# Patient Record
Sex: FEMALE | Race: WHITE | NOT HISPANIC OR LATINO | Employment: UNEMPLOYED | URBAN - METROPOLITAN AREA
[De-identification: names, ages, dates, MRNs, and addresses within clinical notes are randomized per-mention and may not be internally consistent; named-entity substitution may affect disease eponyms.]

---

## 2018-01-09 NOTE — PSYCH
Psych Med Mgmt    Appearance: was calm and cooperative  Observed mood: mood appropriate  Observed mood: affect appropriate  Speech: a normal rate  Thought processes: coherent/organized  Hallucinations: no hallucinations present  Thought Content: no delusions  Abnormal Thoughts: The patient has no suicidal thoughts and no homicidal thoughts  Orientation: The patient is oriented to person, place and time  Recent and Remote Memory: short term memory intact and long term memory intact  Attention Span And Concentration: concentration intact  Insight: Insight intact  Judgment: Her judgment was intact  Treatment Recommendations: Continue Lorazepam 1 mg tid prn  Lexapro 20 mg po qd  Trazodone 50 mg po qhs prn  Risks, Benefits And Possible Side Effects Of Medications: Risks, benefits, and possible side effects of medications explained to patient and patient verbalizes understanding  She reports normal appetite, normal energy level, no weight change and normal number of sleep hours  Pt states overall she is doing well  She started working at [employer]- she states she likes getting out of the house and is enjoying working  She states her anxiety has been stable- some anxiety but no panic attacks  She states some difficulty sleeping occasionally  She states she was diagnosed with anemia and is on iron  She is feeling much better- she was dizzy/ feeling short of breath  Vitals  Signs [Data Includes: Current Encounter]   Recorded: 60OAR8244 69:15AS   Systolic: 601  Diastolic: 83  Height: 5 ft 2 in  Weight: 155 lb   BMI Calculated: 28 35  BSA Calculated: 1 72    DSM    Provisional Diagnosis: Major Depression  Generalized Anxiety  Assessment    1  Chronic major depressive disorder, recurrent episode (296 30) (F33 9)   2  History of Generalized anxiety disorder (300 02) (F41 1)    Plan    1  From  LORazepam 1 MG Oral Tablet  To LORazepam 1 MG Oral Tablet 1 po tid   prn   2  Escitalopram Oxalate 20 MG Oral Tablet; 1 TAB PO QD   3  TraZODone HCl - 50 MG Oral Tablet; 1 tab po qhs prn    Review of Systems    Constitutional: No fever, no chills, feels well, no tiredness, no recent weight gain or loss  Cardiovascular: no complaints of slow or fast heart rate, no chest pain, no palpitations  Respiratory: no complaints of shortness of breath, no wheezing, no dyspnea on exertion  Gastrointestinal: no complaints of abdominal pain, no constipation, no nausea, no diarrhea, no vomiting  Genitourinary: no complaints of dysuria, no incontinence, no pelvic pain, no urinary frequency  Integumentary: no complaints of skin rash, no itching, no dry skin  Active Problems    1  Acute pancreatitis (577 0) (K85 9)   2  Chronic major depressive disorder, recurrent episode (296 30) (F33 9)   3  Hypertension (401 9) (I10)   4  Joint Pain In Both Knees (719 46)   5  Ulcerative colitis without complications (432 4) (A79 62)    Past Medical History    1  History of Allergic Reaction (995 3)   2  History of Arthritis (V13 4)   3  History of Functional murmur (R01 0)   4  History of Generalized anxiety disorder (300 02) (F41 1)   5  History of chronic pancreatitis (V12 79) (Z87 19)   6  History of heartburn (V12 79) (Z87 898)   7  History of Irritable bowel syndrome (564 1) (K58 9)   8  History of Ulcerative colitis without complications (648 2) (D11 19)    The active problems and past medical history were reviewed and updated today  Allergies    1  Gabapentin CAPS   2  Codeine Derivatives   3  Codeine Sulfate TABS   4  Flexeril TABS   5  NSAIDs   6  PredniSONE TABS   7  Robaxin TABS   8  Ultram TABS    Current Meds   1  Escitalopram Oxalate 20 MG Oral Tablet; 1 TAB PO QD  Requested for: 52IUW1218; Last   Rx:[id number] Ordered   2  LORazepam 1 MG Oral Tablet; Last Rx:[id number] Ordered   3  TraZODone HCl - 50 MG Oral Tablet; 1 tab po qhs  Requested for: 29EZA8141;  Last   Rx:[id number] Ordered    The medication list was reviewed and updated today  Family Psych History    1  Family history of Arthritis (V17 7)   2  Family history of Diabetes Mellitus (V18 0)   3  Family history of Essential Hypertension   4  Family history of Pancreatitis    5  Family history of Breast Cancer (V16 3)   6  Family history of Family Health Status 3  Children Living   7  Family history of Ovarian Cancer (V16 41)    Social History    · Denied: History of Alcohol Use (History)   · Current Every Day Smoker (305 1)   · Current Smoker (305 1)   · Denied: History of Drug Use  The social history was reviewed and updated today  The social history was reviewed and is unchanged  End of Encounter Meds    1  Escitalopram Oxalate 20 MG Oral Tablet; 1 TAB PO QD  Requested for: 05DGF3454; Last   Rx:18Mar2016 Ordered   2  LORazepam 1 MG Oral Tablet; 1 po tid prn; Last Rx:18Mar2016 Ordered   3   TraZODone HCl - 50 MG Oral Tablet; 1 tab po qhs prn  Requested for: 77HTJ2254; Last   Rx:18Mar2016 Ordered    Signatures   Electronically signed by : Oziel Elliott, [facility]; Mar 18 2016  2:16PM EST                       (Author)    Electronically signed by : Jamel Blackburn MD; Mar 21 2016  4:56PM EST

## 2018-01-11 NOTE — PSYCH
Psych Med Mgmt    Appearance: was calm and cooperative  Observed mood: anxious  Observed mood: affect appropriate  Speech: a normal rate  Thought processes: coherent/organized  Hallucinations: no hallucinations present  Thought Content: no delusions  Abnormal Thoughts: The patient has no suicidal thoughts and no homicidal thoughts  Orientation: The patient is oriented to person, place and time  Recent and Remote Memory: short term memory intact and long term memory intact  Attention Span And Concentration: concentration intact  Insight: Insight intact  Judgment: Her judgment was intact  Muscle Strength And Tone  Normal gait and station  Treatment Recommendations: Lexapro 20 mg po qd  Buspar 5 mg po tid   Trazodone 50 mg 1-2 po qhs prn  Ativan 1 mg po tid prn  Risks, Benefits And Possible Side Effects Of Medications: Risks, benefits, and possible side effects of medications explained to patient and patient verbalizes understanding  Discussed with patient the risks of sedation, respiratory depression, impairment of ability to drive and potential for abuse and addiction related to treatment with benzodiazepine medications  The patient understands risk of treatment with benzodiazepine medications, agrees to not drive if feels impaired and agrees to take medications as prescribed  She reports normal appetite, normal energy level, no weight change and normal number of sleep hours  Pt seen for follow up MABLE/ MDD  Pt has continues with residual anxiety and panic attacks  She states if she does not sleep she gets quite panicky/ jumpy and restless  She states she did not sleep for a few days and then she was afraid to go to sleep- fearful of dying  She states she has been doing better since those few days in September  She states she has not been doing drugs or drinking any alcohol  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  No new medical issues- feels well physically  Vitals  Signs   Recorded: 46LOC8343 77:81HT   Systolic: 172  Diastolic: 85  Heart Rate: 73  Recorded: 18Nov2016 10:35AM   Respiration: 16  Height: 5 ft 2 in  Weight: 145 lb   BMI Calculated: 26 52  BSA Calculated: 1 67    DSM    Provisional Diagnosis: MABLE  MDD  Assessment    1  Chronic major depressive disorder, recurrent episode (296 30) (F33 9)   2  Generalized anxiety disorder (300 02) (F41 1)    Plan    1  Escitalopram Oxalate 20 MG Oral Tablet; 1 TAB PO QD   2  LORazepam 1 MG Oral Tablet; 1 po tid prn   3  TraZODone HCl - 50 MG Oral Tablet; 1-2  tabs po qhs prn    4  BusPIRone HCl - 5 MG Oral Tablet; 1 po tid    Review of Systems    Constitutional: No fever, no chills, feels well, no tiredness, no recent weight gain or loss  Cardiovascular: no complaints of slow or fast heart rate, no chest pain, no palpitations  Respiratory: no complaints of shortness of breath, no wheezing, no dyspnea on exertion  Gastrointestinal: no complaints of abdominal pain, no constipation, no nausea, no diarrhea, no vomiting  Genitourinary: no complaints of dysuria, no incontinence, no pelvic pain, no urinary frequency  Musculoskeletal: no complaints of arthralgia, no myalgias, no limb pain, no joint stiffness  Integumentary: no complaints of skin rash, no itching, no dry skin  Neurological: no complaints of headache, no confusion, no numbness, no dizziness  Active Problems    1  Acute pancreatitis (577 0) (K85 90)   2  Chronic major depressive disorder, recurrent episode (296 30) (F33 9)   3  Generalized anxiety disorder (300 02) (F41 1)   4  Hypertension (401 9) (I10)   5  Joint Pain In Both Knees (719 46)   6  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)   7  Panic attacks (300 01) (F41 0)   8  Ulcerative colitis without complications (568 9) (X49 93)    Past Medical History    1  History of Allergic Reaction (995 3)   2  History of Arthritis (V13 4)   3  History of Functional murmur (R01 0)   4  Generalized anxiety disorder (300 02) (F41 1)   5  History of chronic pancreatitis (V12 79) (Z87 19)   6  History of heartburn (V12 79) (Z87 898)   7  History of Irritable bowel syndrome (564 1) (K58 9)   8  History of Ulcerative colitis without complications (135 2) (V63 55)    Allergies    1  Gabapentin CAPS   2  Codeine Derivatives   3  Codeine Sulfate TABS   4  Flexeril TABS   5  NSAIDs   6  PredniSONE TABS   7  Robaxin TABS   8  Ultram TABS    Current Meds   1  BusPIRone HCl - 5 MG Oral Tablet; 1 po tid; Therapy: 25ODX4153 to (Last Rx:41Mrq4516)  Requested for: 50SAA9586 Ordered   2  Escitalopram Oxalate 20 MG Oral Tablet; 1 TAB PO QD  Requested for: 79WVB0036; Last   Rx:35Gkn0535 Ordered   3  LORazepam 1 MG Oral Tablet; 1 po tid prn; Last Rx:[id number] Ordered   4  TraZODone HCl - 50 MG Oral Tablet; 1 tab po qhs prn  Requested for: 33RYU3088; Last   Rx:68Otr1172 Ordered    The medication list was reviewed and updated today  Family Psych History  Father    1  Family history of Arthritis (V17 7)   2  Family history of Diabetes Mellitus (V18 0)   3  Family history of Essential Hypertension   4  Family history of Pancreatitis  Family History    5  Family history of Breast Cancer (V16 3)   6  Family history of Family Health Status 3  Children Living   7  Family history of Ovarian Cancer (V16 41)    The family history was reviewed and updated today  Social History    · Denied: History of Alcohol Use (History)   · Current Every Day Smoker (305 1)   · Current Smoker (305 1)   · History of Drug Use  The social history was reviewed and updated today  The social history was reviewed and is unchanged  End of Encounter Meds    1  Escitalopram Oxalate 20 MG Oral Tablet; 1 TAB PO QD  Requested for: 21SZJ3367; Last   Rx:18Nov2016 Ordered   2  LORazepam 1 MG Oral Tablet; 1 po tid prn; Last Rx:18Nov2016 Ordered   3   TraZODone HCl - 50 MG Oral Tablet; 1-2  tabs po qhs prn  Requested for: 66PTL0635;   Last Rx:18Nov2016 Ordered    4  BusPIRone HCl - 5 MG Oral Tablet; 1 po tid;    Therapy: 71CWI3864 to (Last Rx:18Nov2016)  Requested for: 72QSM8783 Ordered    Future Appointments    Date/Time Provider Specialty Site   11/18/2016 10:30 AM Christine Peterson [address]     Signatures   Electronically signed by : Christine Rodriguez; Nov 18 2016 10:46AM EST                       (Author)    Electronically signed by : Derek Cornejo MD; Nov 18 2016 12:03PM EST

## 2018-01-11 NOTE — PSYCH
Psych Med Mgmt    Appearance: was calm and cooperative  Observed mood: depressed and anxious  Observed mood: affect was tearful   anxious  Speech: a normal rate  Thought processes: coherent/organized  Hallucinations: no hallucinations present  Thought Content: no delusions  Abnormal Thoughts: The patient has no suicidal thoughts and no homicidal thoughts  Orientation: The patient is oriented to person, place and time  Recent and Remote Memory: short term memory intact and long term memory intact  Attention Span And Concentration: concentration intact  Insight: Insight intact  Judgment: Her judgment was intact  Treatment Recommendations: Add Buspar 5 mgpo tid  Ativan 1mg up to 3 x per day-  pt has history of narcotic abuse so do not want to increase further  Continue Lexapro 20 mgpo qd  Trazodone 50 mg po qhs  She reports normal appetite, normal energy level, no weight change and decrease in number of sleep hours   Pt seen for follow up anxiety/ depression  Pt states she had a "panic attack" last week that lasted for 6 days and felt tingling in her hands/ "thinking about everything"  She spoke with [employer] and was ME'B Ativan which helped  Her daughter saw the Ativan and was worried it was pain medication so she flushed them down toilet  She has been feeling better but continues with residual anxiety and depression but some improvement  She has decreased sleep at night due to anxiety and decreased appetite but that is improving also  Vitals  Signs [Data Includes: Current Encounter]   Recorded: [id number] 08:54AM   Heart Rate: 90  Respiration: 16  Systolic: 359  Diastolic: 77  Height: 5 ft 2 in  Weight: 145 lb   BMI Calculated: 26 52  BSA Calculated: 1 67    DSM    Provisional Diagnosis: Recurrent Depression  MABLE  Assessment    1  Chronic major depressive disorder, recurrent episode (296 30) (F33 9)   2  Generalized anxiety disorder (300 02) (F41 1)   3   Panic attacks (300 01) (F41 0)   4  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)    Plan    1  Escitalopram Oxalate 20 MG Oral Tablet; 1 TAB PO QD   2  LORazepam 1 MG Oral Tablet; 1 po tid prn   3  TraZODone HCl - 50 MG Oral Tablet; 1 tab po qhs prn    4  BusPIRone HCl - 5 MG Oral Tablet; 1 po tid    Review of Systems    Constitutional: No fever, no chills, feels well, no tiredness, no recent weight gain or loss  Cardiovascular: no complaints of slow or fast heart rate, no chest pain, no palpitations  Respiratory: no complaints of shortness of breath, no wheezing, no dyspnea on exertion  Gastrointestinal: no complaints of abdominal pain, no constipation, no nausea, no diarrhea, no vomiting  Genitourinary: no complaints of dysuria, no incontinence, no pelvic pain, no urinary frequency  Musculoskeletal: no complaints of arthralgia, no myalgias, no limb pain, no joint stiffness  Integumentary: no complaints of skin rash, no itching, no dry skin  Neurological: no complaints of headache, no confusion, no numbness, no dizziness  Active Problems    1  Acute pancreatitis (577 0) (K85 9)   2  Chronic major depressive disorder, recurrent episode (296 30) (F33 9)   3  Generalized anxiety disorder (300 02) (F41 1)   4  Hypertension (401 9) (I10)   5  Joint Pain In Both Knees (719 46)   6  Ulcerative colitis without complications (005 4) (I64 35)    Past Medical History    1  History of Allergic Reaction (995 3)   2  History of Arthritis (V13 4)   3  History of Functional murmur (R01 0)   4  Generalized anxiety disorder (300 02) (F41 1)   5  History of chronic pancreatitis (V12 79) (Z87 19)   6  History of heartburn (V12 79) (Z87 898)   7  History of Irritable bowel syndrome (564 1) (K58 9)   8  History of Ulcerative colitis without complications (213 5) (Y29 04)    Allergies    1  Gabapentin CAPS   2  Codeine Derivatives   3  Codeine Sulfate TABS   4  Flexeril TABS   5  NSAIDs   6  PredniSONE TABS   7   Robaxin TABS   8  Ultram TABS    Current Meds   1  Escitalopram Oxalate 20 MG Oral Tablet; 1 TAB PO QD  Requested for: 60CPE0540; Last   Rx:18Mar2016 Ordered   2  LORazepam 1 MG Oral Tablet; 1 po tid prn; Last Rx:18Mar2016 Ordered   3  TraZODone HCl - 50 MG Oral Tablet; 1 tab po qhs prn  Requested for: 08LZW1454; Last   Rx:18Mar2016 Ordered    Family Psych History  Father    1  Family history of Arthritis (V17 7)   2  Family history of Diabetes Mellitus (V18 0)   3  Family history of Essential Hypertension   4  Family history of Pancreatitis  Family History    5  Family history of Breast Cancer (V16 3)   6  Family history of Family Health Status 3  Children Living   7  Family history of Ovarian Cancer (V16 41)    The family history was reviewed and updated today  Social History    · Denied: History of Alcohol Use (History)   · Current Every Day Smoker (305 1)   · Current Smoker (305 1)   · History of Drug Use  The social history was reviewed and updated today  End of Encounter Meds    1  Escitalopram Oxalate 20 MG Oral Tablet; 1 TAB PO QD  Requested for: 92SPR6610; Last   Rx:10Mmj8736 Ordered   2  LORazepam 1 MG Oral Tablet; 1 po tid prn; Last Rx:84Aeb1288 Ordered   3  TraZODone HCl - 50 MG Oral Tablet; 1 tab po qhs prn  Requested for: 99ZQL2941; Last   Rx:29Fhm6656 Ordered    4  BusPIRone HCl - 5 MG Oral Tablet; 1 po tid;    Therapy: 91PJL5121 to (Last Rx:99Mcd9014) Ordered    Future Appointments    Date/Time Provider Specialty Site   09/07/2016 02:00 PM Geetha Ram [facility] [place] [facility]     Signatures   Electronically signed by : Oziel Elliott [facility]; Jul 6 2016  9:00AM EST                       (Author)    Electronically signed by : Jamel Blackburn MD; Jul 6 2016  2:25PM EST

## 2024-01-04 ENCOUNTER — APPOINTMENT (EMERGENCY)
Dept: CT IMAGING | Facility: HOSPITAL | Age: 49
End: 2024-01-04
Payer: COMMERCIAL

## 2024-01-04 ENCOUNTER — HOSPITAL ENCOUNTER (INPATIENT)
Facility: HOSPITAL | Age: 49
LOS: 1 days | End: 2024-01-05
Attending: EMERGENCY MEDICINE | Admitting: EMERGENCY MEDICINE
Payer: COMMERCIAL

## 2024-01-04 DIAGNOSIS — E83.42 HYPOMAGNESEMIA: ICD-10-CM

## 2024-01-04 DIAGNOSIS — D64.9 ANEMIA: ICD-10-CM

## 2024-01-04 DIAGNOSIS — E87.1 HYPONATREMIA: ICD-10-CM

## 2024-01-04 DIAGNOSIS — F10.939 ALCOHOL WITHDRAWAL (HCC): ICD-10-CM

## 2024-01-04 DIAGNOSIS — K92.0 HEMATEMESIS: ICD-10-CM

## 2024-01-04 DIAGNOSIS — E80.6 HYPERBILIRUBINEMIA: ICD-10-CM

## 2024-01-04 DIAGNOSIS — E46 MALNUTRITION (HCC): ICD-10-CM

## 2024-01-04 DIAGNOSIS — F10.10 ALCOHOL ABUSE: Primary | ICD-10-CM

## 2024-01-04 DIAGNOSIS — E87.8 HYPOCHLOREMIA: ICD-10-CM

## 2024-01-04 DIAGNOSIS — E87.6 HYPOKALEMIA: ICD-10-CM

## 2024-01-04 PROBLEM — F10.90 ALCOHOL USE DISORDER: Status: ACTIVE | Noted: 2024-01-04

## 2024-01-04 PROBLEM — K76.0 HEPATIC STEATOSIS: Status: ACTIVE | Noted: 2024-01-04

## 2024-01-04 LAB
ALBUMIN SERPL BCP-MCNC: 3.6 G/DL (ref 3.5–5)
ALP SERPL-CCNC: 253 U/L (ref 34–104)
ALT SERPL W P-5'-P-CCNC: 38 U/L (ref 7–52)
ANION GAP SERPL CALCULATED.3IONS-SCNC: 17 MMOL/L
ANION GAP SERPL CALCULATED.3IONS-SCNC: 19 MMOL/L
AST SERPL W P-5'-P-CCNC: 158 U/L (ref 13–39)
ATRIAL RATE: 90 BPM
ATRIAL RATE: 91 BPM
BASOPHILS # BLD AUTO: 0.03 THOUSANDS/ÂΜL (ref 0–0.1)
BASOPHILS NFR BLD AUTO: 0 % (ref 0–1)
BETA-HYDROXYBUTYRATE: 2.8 MMOL/L
BILIRUB SERPL-MCNC: 3.67 MG/DL (ref 0.2–1)
BUN SERPL-MCNC: 3 MG/DL (ref 5–25)
BUN SERPL-MCNC: 3 MG/DL (ref 5–25)
CALCIUM SERPL-MCNC: 7.8 MG/DL (ref 8.4–10.2)
CALCIUM SERPL-MCNC: 8.4 MG/DL (ref 8.4–10.2)
CHLORIDE SERPL-SCNC: 86 MMOL/L (ref 96–108)
CHLORIDE SERPL-SCNC: 86 MMOL/L (ref 96–108)
CO2 SERPL-SCNC: 19 MMOL/L (ref 21–32)
CO2 SERPL-SCNC: 19 MMOL/L (ref 21–32)
CREAT SERPL-MCNC: 0.44 MG/DL (ref 0.6–1.3)
CREAT SERPL-MCNC: 0.47 MG/DL (ref 0.6–1.3)
EOSINOPHIL # BLD AUTO: 0.02 THOUSAND/ÂΜL (ref 0–0.61)
EOSINOPHIL NFR BLD AUTO: 0 % (ref 0–6)
ERYTHROCYTE [DISTWIDTH] IN BLOOD BY AUTOMATED COUNT: 12.6 % (ref 11.6–15.1)
ETHANOL SERPL-MCNC: 80 MG/DL
GFR SERPL CREATININE-BSD FRML MDRD: 118 ML/MIN/1.73SQ M
GFR SERPL CREATININE-BSD FRML MDRD: 120 ML/MIN/1.73SQ M
GLUCOSE SERPL-MCNC: 103 MG/DL (ref 65–140)
GLUCOSE SERPL-MCNC: 99 MG/DL (ref 65–140)
HCT VFR BLD AUTO: 28.8 % (ref 34.8–46.1)
HGB BLD-MCNC: 10.5 G/DL (ref 11.5–15.4)
IMM GRANULOCYTES # BLD AUTO: 0.02 THOUSAND/UL (ref 0–0.2)
IMM GRANULOCYTES NFR BLD AUTO: 0 % (ref 0–2)
LIPASE SERPL-CCNC: <6 U/L (ref 11–82)
LYMPHOCYTES # BLD AUTO: 1.21 THOUSANDS/ÂΜL (ref 0.6–4.47)
LYMPHOCYTES NFR BLD AUTO: 16 % (ref 14–44)
MAGNESIUM SERPL-MCNC: 1.6 MG/DL (ref 1.9–2.7)
MCH RBC QN AUTO: 32.2 PG (ref 26.8–34.3)
MCHC RBC AUTO-ENTMCNC: 36.5 G/DL (ref 31.4–37.4)
MCV RBC AUTO: 88 FL (ref 82–98)
MONOCYTES # BLD AUTO: 0.87 THOUSAND/ÂΜL (ref 0.17–1.22)
MONOCYTES NFR BLD AUTO: 11 % (ref 4–12)
NEUTROPHILS # BLD AUTO: 5.54 THOUSANDS/ÂΜL (ref 1.85–7.62)
NEUTS SEG NFR BLD AUTO: 73 % (ref 43–75)
NRBC BLD AUTO-RTO: 0 /100 WBCS
P AXIS: -24 DEGREES
P AXIS: 49 DEGREES
PLATELET # BLD AUTO: 117 THOUSANDS/UL (ref 149–390)
PMV BLD AUTO: 11.4 FL (ref 8.9–12.7)
POTASSIUM SERPL-SCNC: 2.4 MMOL/L (ref 3.5–5.3)
POTASSIUM SERPL-SCNC: 3 MMOL/L (ref 3.5–5.3)
PR INTERVAL: 154 MS
PR INTERVAL: 162 MS
PROT SERPL-MCNC: 7 G/DL (ref 6.4–8.4)
QRS AXIS: 52 DEGREES
QRS AXIS: 79 DEGREES
QRSD INTERVAL: 90 MS
QRSD INTERVAL: 96 MS
QT INTERVAL: 392 MS
QT INTERVAL: 392 MS
QTC INTERVAL: 479 MS
QTC INTERVAL: 482 MS
RBC # BLD AUTO: 3.26 MILLION/UL (ref 3.81–5.12)
SODIUM SERPL-SCNC: 122 MMOL/L (ref 135–147)
SODIUM SERPL-SCNC: 124 MMOL/L (ref 135–147)
T WAVE AXIS: 14 DEGREES
T WAVE AXIS: 22 DEGREES
VENTRICULAR RATE: 90 BPM
VENTRICULAR RATE: 91 BPM
WBC # BLD AUTO: 7.69 THOUSAND/UL (ref 4.31–10.16)

## 2024-01-04 PROCEDURE — 83735 ASSAY OF MAGNESIUM: CPT | Performed by: EMERGENCY MEDICINE

## 2024-01-04 PROCEDURE — 80053 COMPREHEN METABOLIC PANEL: CPT | Performed by: EMERGENCY MEDICINE

## 2024-01-04 PROCEDURE — 99291 CRITICAL CARE FIRST HOUR: CPT | Performed by: PHYSICIAN ASSISTANT

## 2024-01-04 PROCEDURE — 99285 EMERGENCY DEPT VISIT HI MDM: CPT | Performed by: EMERGENCY MEDICINE

## 2024-01-04 PROCEDURE — 85025 COMPLETE CBC W/AUTO DIFF WBC: CPT | Performed by: EMERGENCY MEDICINE

## 2024-01-04 PROCEDURE — 96375 TX/PRO/DX INJ NEW DRUG ADDON: CPT

## 2024-01-04 PROCEDURE — 82077 ASSAY SPEC XCP UR&BREATH IA: CPT | Performed by: EMERGENCY MEDICINE

## 2024-01-04 PROCEDURE — 83690 ASSAY OF LIPASE: CPT | Performed by: EMERGENCY MEDICINE

## 2024-01-04 PROCEDURE — 96365 THER/PROPH/DIAG IV INF INIT: CPT

## 2024-01-04 PROCEDURE — 99285 EMERGENCY DEPT VISIT HI MDM: CPT

## 2024-01-04 PROCEDURE — HZ2ZZZZ DETOXIFICATION SERVICES FOR SUBSTANCE ABUSE TREATMENT: ICD-10-PCS | Performed by: EMERGENCY MEDICINE

## 2024-01-04 PROCEDURE — 82010 KETONE BODYS QUAN: CPT | Performed by: EMERGENCY MEDICINE

## 2024-01-04 PROCEDURE — 74177 CT ABD & PELVIS W/CONTRAST: CPT

## 2024-01-04 PROCEDURE — 80048 BASIC METABOLIC PNL TOTAL CA: CPT | Performed by: EMERGENCY MEDICINE

## 2024-01-04 PROCEDURE — 93005 ELECTROCARDIOGRAM TRACING: CPT

## 2024-01-04 PROCEDURE — 36415 COLL VENOUS BLD VENIPUNCTURE: CPT | Performed by: EMERGENCY MEDICINE

## 2024-01-04 RX ORDER — HYDROXYZINE 50 MG/1
50 TABLET, FILM COATED ORAL EVERY 6 HOURS PRN
Status: DISCONTINUED | OUTPATIENT
Start: 2024-01-04 | End: 2024-01-05 | Stop reason: HOSPADM

## 2024-01-04 RX ORDER — PHENOBARBITAL SODIUM 65 MG/ML
65 INJECTION INTRAMUSCULAR ONCE
Status: COMPLETED | OUTPATIENT
Start: 2024-01-04 | End: 2024-01-04

## 2024-01-04 RX ORDER — POTASSIUM CHLORIDE 20 MEQ/1
20 TABLET, EXTENDED RELEASE ORAL DAILY
Status: DISCONTINUED | OUTPATIENT
Start: 2024-01-05 | End: 2024-01-04

## 2024-01-04 RX ORDER — FOLIC ACID 1 MG/1
1 TABLET ORAL DAILY
Status: DISCONTINUED | OUTPATIENT
Start: 2024-01-05 | End: 2024-01-05 | Stop reason: HOSPADM

## 2024-01-04 RX ORDER — DIAZEPAM 5 MG/ML
5 INJECTION, SOLUTION INTRAMUSCULAR; INTRAVENOUS ONCE
Status: DISCONTINUED | OUTPATIENT
Start: 2024-01-04 | End: 2024-01-04

## 2024-01-04 RX ORDER — DIAZEPAM 5 MG/ML
5 INJECTION, SOLUTION INTRAMUSCULAR; INTRAVENOUS ONCE
Status: COMPLETED | OUTPATIENT
Start: 2024-01-04 | End: 2024-01-04

## 2024-01-04 RX ORDER — MAGNESIUM HYDROXIDE/ALUMINUM HYDROXICE/SIMETHICONE 120; 1200; 1200 MG/30ML; MG/30ML; MG/30ML
30 SUSPENSION ORAL EVERY 6 HOURS PRN
Status: DISCONTINUED | OUTPATIENT
Start: 2024-01-04 | End: 2024-01-05 | Stop reason: HOSPADM

## 2024-01-04 RX ORDER — PHENOBARBITAL SODIUM 130 MG/ML
130 INJECTION INTRAMUSCULAR ONCE
Status: COMPLETED | OUTPATIENT
Start: 2024-01-04 | End: 2024-01-04

## 2024-01-04 RX ORDER — POTASSIUM CHLORIDE 14.9 MG/ML
20 INJECTION INTRAVENOUS
Qty: 200 ML | Refills: 0 | Status: COMPLETED | OUTPATIENT
Start: 2024-01-04 | End: 2024-01-04

## 2024-01-04 RX ORDER — MAGNESIUM SULFATE HEPTAHYDRATE 40 MG/ML
2 INJECTION, SOLUTION INTRAVENOUS ONCE
Status: DISCONTINUED | OUTPATIENT
Start: 2024-01-04 | End: 2024-01-04

## 2024-01-04 RX ORDER — POTASSIUM CHLORIDE 20 MEQ/1
40 TABLET, EXTENDED RELEASE ORAL DAILY
Status: DISCONTINUED | OUTPATIENT
Start: 2024-01-05 | End: 2024-01-05

## 2024-01-04 RX ORDER — MAGNESIUM SULFATE HEPTAHYDRATE 40 MG/ML
2 INJECTION, SOLUTION INTRAVENOUS ONCE
Status: COMPLETED | OUTPATIENT
Start: 2024-01-04 | End: 2024-01-04

## 2024-01-04 RX ORDER — SODIUM CHLORIDE 9 MG/ML
75 INJECTION, SOLUTION INTRAVENOUS CONTINUOUS
Status: DISCONTINUED | OUTPATIENT
Start: 2024-01-04 | End: 2024-01-05

## 2024-01-04 RX ORDER — ENOXAPARIN SODIUM 100 MG/ML
40 INJECTION SUBCUTANEOUS DAILY
Status: DISCONTINUED | OUTPATIENT
Start: 2024-01-05 | End: 2024-01-05

## 2024-01-04 RX ORDER — ONDANSETRON 2 MG/ML
4 INJECTION INTRAMUSCULAR; INTRAVENOUS EVERY 6 HOURS PRN
Status: DISCONTINUED | OUTPATIENT
Start: 2024-01-04 | End: 2024-01-05 | Stop reason: HOSPADM

## 2024-01-04 RX ORDER — SODIUM CHLORIDE 9 MG/ML
125 INJECTION, SOLUTION INTRAVENOUS CONTINUOUS
Status: DISCONTINUED | OUTPATIENT
Start: 2024-01-04 | End: 2024-01-04

## 2024-01-04 RX ADMIN — DIAZEPAM 5 MG: 10 INJECTION, SOLUTION INTRAMUSCULAR; INTRAVENOUS at 14:35

## 2024-01-04 RX ADMIN — PHENOBARBITAL SODIUM 130 MG: 130 INJECTION INTRAMUSCULAR; INTRAVENOUS at 17:38

## 2024-01-04 RX ADMIN — PHENOBARBITAL SODIUM 65 MG: 65 INJECTION INTRAMUSCULAR; INTRAVENOUS at 19:44

## 2024-01-04 RX ADMIN — HYDROXYZINE HYDROCHLORIDE 50 MG: 50 TABLET, FILM COATED ORAL at 21:23

## 2024-01-04 RX ADMIN — MAGNESIUM SULFATE IN WATER 2 G: 40 INJECTION, SOLUTION INTRAVENOUS at 17:28

## 2024-01-04 RX ADMIN — SODIUM CHLORIDE 75 ML/HR: 0.9 INJECTION, SOLUTION INTRAVENOUS at 17:27

## 2024-01-04 RX ADMIN — POTASSIUM CHLORIDE 20 MEQ: 14.9 INJECTION, SOLUTION INTRAVENOUS at 14:42

## 2024-01-04 RX ADMIN — POTASSIUM CHLORIDE 20 MEQ: 14.9 INJECTION, SOLUTION INTRAVENOUS at 17:32

## 2024-01-04 RX ADMIN — IOHEXOL 75 ML: 350 INJECTION, SOLUTION INTRAVENOUS at 14:09

## 2024-01-04 NOTE — H&P
"HISTORY & PHYSICAL EXAM  DEPARTMENT OF MEDICAL TOXICOLOGY  LEVEL 4 MEDICAL DETOX UNIT  Leslye Holland 48 y.o. female MRN: [id number]  Unit/Bed#:  DETOX 510-01 Encounter: [id number]      Reason for Admission/Principal Problem: Ethanol withdrawal, Ethanol use disorder  Admitting Provider: Jannette Fletcher PA-C  Attending Provider: Jenifer Terrazas*   1/4/2024 11:44 AM        * Alcohol withdrawal syndrome (HCC)  Assessment & Plan  Patient received 5 mg of Valium in the ED for withdrawal symptoms  Initiate SEWS protocol for medically assisted alcohol withdrawal  Initial dose of phenobarbital in unit was 160mg     Alcohol use disorder  Assessment & Plan  Patient admits to drinking approximately 10-12 \"tall cans\" and a fifth of [employer] vodka since New Year's Nany  She states she drinks approximately 12 beers a day as well as 5 shots of vodka  She presents to the ED today with complaints of abdominal pain and initially declined inpatient detox however later on during her encounter, patient was agreeable  ETOH in the ED was 80    Hyponatremia  Assessment & Plan  Sodium was 124 on presentation to the ED  Likely secondary to heavy alcohol use  Patient received 500 cc IV fluid bolus in the ED.  Will continue IV hydration with normal saline at 75cc/hr  Consult placed to nephrology  Continue to monitor, labs ordered for tomorrow morning    Hypokalemia  Assessment & Plan  Potassium 2.4 on admission  Patient received 20 mEq IV potassium chloride in the ED  And additional 20 mEq IV potassium chloride ordered in the unit as well as 40 mill equivalents p.o. potassium chloride  Continue to monitor, labs ordered for the morning    Hypomagnesemia  Assessment & Plan  Magnesium was 1.6 in the ED  Patient received 2 g IV magnesium sulfate in the ED, an additional 2 g ordered in the unit  Continue to monitor, labs ordered for the morning      Hepatic steatosis  Assessment & Plan  Patient was complaining of abdominal pain so " CT abdomen/pelvis was obtained in the ED with findings of marked hepatomegaly with severe fatty infiltration, portal hypertension  Likely secondary to patient's heavy alcohol use  Continue to monitor for worsening abdominal pain, LFTs, T. bili               VTE Prophylaxis: Enoxaparin (Lovenox)  / sequential compression device   Code Status: Full code      Anticipated Length of Stay:  Patient will be admitted on an Inpatient basis with an anticipated length of stay of  >2  midnights.   Justification for Hospital Stay: Alcohol use disorder, alcohol withdrawal syndrome, significant electrolytes abnormalities/dehydration    For any questions or concerns, please Tiger Text the advanced practitioner in the role of [facility]-DETOX-AP On Call      This patient qualifies for Level IV medically managed intensive inpatient services under the criteria set by the American Society of Addiction Medicine, including dimensions 1-3. The patient is in withdrawal (or is intoxicated with high risk of withdrawal), with severe and unstable medical and/or psychiatric (dual diagnosis) problems, requiring requires 24-hour medical and nursing care and the full resources of a licensed hospital.          SEWS PHENOBARBITAL PROTOCOL FOR ALCOHOL WITHDRAWAL    Admit patient to medical detox unit and continue supportive care and stabilization of acute ethanol withdrawal per medical toxicology/detox treatment pathway. Monitor ethanol withdrawal severity via the Severity of Ethanol Withdrawal Scale (SEWS) Q4 hours and then hourly if/when SEWS > 6. Treat withdrawal per pathway and reassess Q30-60 minutes.           Mild SEWS Score 1-6  Administer medications* (IV or PO; PO preferred):  If initial SEWS score: diazepam 10mg PO/IV x 1 AND phenobarbital 65 mg PO/IV x 1  If repeat SEWS score 1-6: phenobarbital 65 mg PO/IV q1 hour x 5 doses maximum   Reassessment:   SEWS q1 hour after each dose until SEWS 0 x 2 hours  VS q1 hours (until SEWS 0, then q4  hours)  Notify provider for bedside evaluation if 5-dose maximum is reached, RASS of -3 to -5, or SEWS score escalates to moderate or severe.   Moderate SEWS Score 7-12  Administer medications* (IV):  If initial SEWS score: diazepam 10mg IV x 1 AND phenobarbital 260 mg IV x 1  If repeat SEWS score 7-12 or score escalated from mild: phenobarbital 130 mg IV q30 minutes x 5 doses maximum   Reassessment:  SEWS q30 minutes after each dose until SEWS < 7 (then hourly until SEWS 0 x 2 hours)  VS q30 minutes until SEWS < 7 (then hourly until SEWS 0, then q4 hours)  Notify provider for bedside evaluation if 5-dose maximum is reached, RASS of -3 to -5, or SEWS score escalates to severe.   Severe SEWS Score ? 13  Administer medications* (IV):  If initial SEWS score: Diazepam 10 mg IV x 1 AND phenobarbital 650 mg IV piggyback x 1 over 15-30 minutes  If repeat SEWS score ? 13 or score escalated from mild or moderate: phenobarbital 130 mg IV q30 minutes x 5 doses maximum   Reassessment:  SEWS q30 minutes after each dose until SEWS < 7 (then hourly until SEWS 0 x 2 hours)   VS q30 minutes until SEWS < 7 (then hourly until SEWS 0, then q4 hours)  Notify provider for bedside evaluation if 5-dose maximum is reached or RASS of -3 to -5   *Hold medications and notify provider if CNS depression, respirations < 10/min, or RASS of -3 to -5.         Medications to be administered adjunctively if more than 2 grams of phenobarbital is needed for stabilization of withdrawal; require attending approval.   Dexmedetomidine infusion 0.1-1mcg/kg/hr IV infusion, titratable to reduced agitation (Goal: RASS -2)  Ketamine   Acute agitated delirium: 1-2 mg/kg IV or 4-5 mg/kg IM  Refractory withdrawal: 0.1-1mg/kg/hr IV infusion, titratable to reduced agitation (Goal: RASS -2)    Further evaluation, screening and treatment:  Evaluate complete metabolic panel, transaminases, INR, and lipase. Assess hepatic ultrasound for any sign of alcoholic liver  "disease or cirrhosis, and ultimately refer for further hepatic evaluation and care as/if indicated.    Additional medications for ethanol associated malnutrition:  Thiamine 100 mg IV daily, increase to 500 mg TID for signs/symptoms of Wernicke's Encephalopathy or Wernicke Korsakoff Syndrome   Folic acid 1 mg IV daily   Multivitamin PO daily      Will offer first monthly injection of Naltrexone 380 mg IM, once patient is stabilized, as it has been shown to assist in decreasing cravings for ethanol.     Evaluate and treat for coexisting substance use, such as opioids and nicotine. Discuss risk factors for infectious disease, such as history of intravenous drug abuse, and offer hepatitis and HIV screening if indicated.     Case management consultation to assist with coordination of subsequent treatment after discharge.      Hx and PE limited by: None    HPI: Leslye Holland is a 48 y.o. year old female with no apparent past medical history who presents with alcohol use disorder and alcohol withdrawal syndrome.  She initially presented to the ED requesting Xanax and Ativan for withdrawal symptoms.  She states she drank approximately 10-12 \"tall cans\" and a fifth of [employer] vodka since New Years Nany. She states she has been drinking approximately 12 tall beers and 5 shots of vodka daily.  Her last drink was this morning.  Alcohol level in the ED was 80.  Patient was found to have significant electrolyte abnormalities with sodium of 124, potassium 2.4, magnesium 1.6.  She was given 500 cc bolus of normal saline in the ED as well as 20meq of IV potassium chloride and 2 g IV magnesium sulfate.  Her vitals remain stable.  She currently admits to anxiety and shakiness.  She was given 5 mg of Valium in the ED.  Patient will be admitted to inpatient detox unit and monitored under [facility] protocol for alcohol withdrawal syndrome.    Preferred alcoholic beverage(s): Beer and vodka  Quantity and frequency of alcohol intake: 12 tall " cans beer daily, 5 shots vodka daily   Use of any ethanol substitutes (toxic alcohols): No  Date/Time of last alcohol intake: 1/4/23   Current signs and symptoms of ethanol withdrawal: anxiety, tremor, and nausea    SEWS Total Score: 8 (1/4/2024  5:03 PM)        Ethanol Withdrawal History  Previous ethanol withdrawal? yes  Prior inpatient treatment for ethanol withdrawal? no  Prior outpatient treatment for ethanol withdrawal? no  History of seizures with prior ethanol withdrawal? yes  Prior treatment with naltrexone (Vivitrol)? no  Current treatment with naltrexone (Vivitrol)? no  Other current treatment for ethanol use disorder? no  Co-existing substance use? no    Review of PDMP: yes     Social History     Substance and Sexual Activity   Alcohol Use Yes    Comment: Fifth of vodka and 12 tall boys     Social History     Substance and Sexual Activity   Drug Use Never     Social History     Tobacco Use   Smoking Status Every Day    Current packs/day: 0.25    Average packs/day: 0.3 packs/day for 6.0 years (1.5 ttl pk-yrs)    Types: Cigarettes    Start date: 1/1/2018   Smokeless Tobacco Never       Review of Systems   Constitutional:  Negative for chills and fever.   HENT: Negative.     Eyes: Negative.    Respiratory:  Negative for cough, chest tightness, shortness of breath and wheezing.    Cardiovascular:  Negative for chest pain, palpitations and leg swelling.   Gastrointestinal:  Positive for abdominal pain. Negative for abdominal distention, blood in stool, constipation, diarrhea, nausea and vomiting.   Endocrine: Negative.    Genitourinary: Negative.    Musculoskeletal: Negative.    Skin: Negative.    Allergic/Immunologic: Negative.    Neurological:  Negative for dizziness, tremors, syncope, facial asymmetry, weakness, light-headedness, numbness and headaches.   Psychiatric/Behavioral:  Negative for confusion, hallucinations, self-injury and suicidal ideas. The patient is nervous/anxious.        Historical  Information   History reviewed. No pertinent past medical history.  Past Surgical History:   Procedure Laterality Date    KNEE SURGERY       History reviewed. No pertinent family history.  Social History   Marital Status: /Civil Union   Occupation: Unemployed  Patient Pre-hospital Living Situation: Lives with father and son  Patient Pre-hospital Level of Mobility: Mobile  Patient Pre-hospital Diet Restrictions: None    Allergies   Allergen Reactions    Acetaminophen Other (See Comments)     Other reaction(s): LIVER ISSUES    Aspirin GI Intolerance    Codeine Hives    Cyclobenzaprine Hives    Ketorolac Tromethamine Hives    Lamotrigine Other (See Comments)     lower extremity pain    Methocarbamol Hives    Nsaids Other (See Comments)     GI upset:Toradol=allergy    Tramadol Hives     Other reaction(s): rash       Prior to Admission medications    Not on File       Current Facility-Administered Medications   Medication Dose Route Frequency    aluminum-magnesium hydroxide-simethicone (MAALOX) oral suspension 30 mL  30 mL Oral Q6H PRN    [START ON 1/5/2024] enoxaparin (LOVENOX) subcutaneous injection 40 mg  40 mg Subcutaneous Daily    [START ON 1/5/2024] folic acid (FOLVITE) tablet 1 mg  1 mg Oral Daily    [START ON 1/5/2024] multivitamin-minerals (CENTRUM) tablet 1 tablet  1 tablet Oral Daily    [START ON 1/5/2024] nicotine (NICODERM CQ) 7 mg/24hr TD 24 hr patch 1 patch  1 patch Transdermal Daily    ondansetron (ZOFRAN) injection 4 mg  4 mg Intravenous Q6H PRN    [START ON 1/5/2024] potassium chloride (K-DUR,KLOR-CON) CR tablet 40 mEq  40 mEq Oral Daily    potassium chloride 20 mEq IVPB (premix)  20 mEq Intravenous Q2H    sodium chloride 0.9 % infusion  75 mL/hr Intravenous Continuous    thiamine (VITAMIN B1) 500 mg in sodium chloride 0.9 % 50 mL IVPB  500 mg Intravenous Q8H VARGAS       Continuous Infusions:sodium chloride, 75 mL/hr, Last Rate: 75 mL/hr (01/04/24 1727)             Objective     No intake or output  data in the 24 hours ending 01/04/24 5248    Invasive Devices:   Peripheral IV 01/04/24 Left;Proximal;Ventral (anterior) Forearm (Active)   Site Assessment WDL 01/04/24 1254   Dressing Type Transparent 01/04/24 1254   Line Status Blood return noted 01/04/24 1254   Dressing Status Clean;Dry;Intact 01/04/24 1254       Vitals   Vitals:    01/04/24 1151 01/04/24 1436 01/04/24 1640   BP: 132/91 130/77 104/60   TempSrc: Tympanic  Temporal   Pulse: 91 88 93   Resp: 16 16 18   Patient Position - Orthostatic VS: Lying Lying Lying   Temp: (!) 97 °F (36.1 °C)  97.5 °F (36.4 °C)       Physical Exam  Constitutional:       General: She is not in acute distress.     Appearance: She is not diaphoretic.      Comments: Chronically ill-appearing, malnourished   HENT:      Head: Normocephalic and atraumatic.      Nose: Nose normal.      Mouth/Throat:      Mouth: Mucous membranes are dry.   Eyes:      Extraocular Movements: Extraocular movements intact.      Pupils: Pupils are equal, round, and reactive to light.   Cardiovascular:      Rate and Rhythm: Normal rate and regular rhythm.      Heart sounds: Normal heart sounds.   Pulmonary:      Effort: Pulmonary effort is normal. No respiratory distress.      Breath sounds: Normal breath sounds.   Abdominal:      General: Abdomen is flat. There is no distension.      Palpations: Abdomen is soft.      Tenderness: There is abdominal tenderness (epigastric region). There is no guarding or rebound.   Musculoskeletal:         General: Normal range of motion.      Cervical back: Normal range of motion and neck supple. No tenderness.   Skin:     General: Skin is warm and dry.      Capillary Refill: Capillary refill takes less than 2 seconds.      Coloration: Skin is jaundiced.   Neurological:      General: No focal deficit present.      Mental Status: She is alert and oriented to person, place, and time. Mental status is at baseline.   Psychiatric:         Mood and Affect: Mood normal.          "Behavior: Behavior normal.           Data:    EKG, Pathology, and Other Studies: I have personally reviewed pertinent reports.   and I have personally reviewed pertinent films in PACS  EKG:   EKG documented by ED staff.  Unable to pull up images however.  Will order new EKG      Lab Results:  CBC ETOH     Lab Results   Component Value Date    WBC 7.69 01/04/2024    WBC 11.53 (H) 05/08/2014    RBC 3.26 (L) 01/04/2024    RBC 5.05 05/08/2014    HGB 10.5 (L) 01/04/2024    HGB 15.2 05/08/2014    HCT 28.8 (L) 01/04/2024    HCT 44.9 05/08/2014    MCV 88 01/04/2024    MCV 89 05/08/2014    MCH 32.2 01/04/2024    MCH 30.1 05/08/2014    MCHC 36.5 01/04/2024    MCHC 33.9 05/08/2014    RDW 12.6 01/04/2024    RDW 13.6 05/08/2014     (L) 01/04/2024     05/08/2014    MPV 11.4 01/04/2024    MPV 9.2 05/08/2014      No results found for: \"LACTICACID\"   CMP UA         Component Value Date/Time     05/08/2014 1432    K 3.0 (L) 01/04/2024 1434    K 3.5 05/08/2014 1432    CL 86 (L) 01/04/2024 1434     05/08/2014 1432    CO2 19 (L) 01/04/2024 1434    CO2 31 05/08/2014 1432    BUN 3 (L) 01/04/2024 1434    BUN 11 05/08/2014 1432    CREATININE 0.44 (L) 01/04/2024 1434    CREATININE 0.51 (L) 05/08/2014 1432         Component Value Date/Time    CALCIUM 7.8 (L) 01/04/2024 1434    CALCIUM 9.3 05/08/2014 1432    ALKPHOS 253 (H) 01/04/2024 1253    ALKPHOS 128 05/08/2014 1432     (H) 01/04/2024 1253    AST 36 05/08/2014 1432    ALT 38 01/04/2024 1253    ALT 57 05/08/2014 1432    BILITOT 0.69 05/08/2014 1432      Lab Results   Component Value Date    CLARITYU Clear 05/08/2014    COLORU Yellow 05/08/2014    SPECGRAV 1.025 05/08/2014    PHUR 7.0 05/08/2014    GLUCOSEU Negative 05/08/2014    KETONESU 40 (2+) (A) 05/08/2014    BLOODU Trace (A) 05/08/2014    NITRITE Negative 05/08/2014    BILIRUBINUR Negative 05/08/2014    UROBILINOGEN 1.0 05/08/2014    LEUKOCYTESUR Trace (A) 05/08/2014    WBCUA 4-10 (A) 05/08/2014    " "RBCUA 2-4 05/08/2014    BACTERIA Occasional 05/08/2014    EPIS Occasional (A) 05/08/2014        Liver Function Test: ASA     Lab Results   Component Value Date    TBILI 3.67 (H) 01/04/2024    ALKPHOS 253 (H) 01/04/2024    ALKPHOS 128 05/08/2014     (H) 01/04/2024    AST 36 05/08/2014    ALT 38 01/04/2024    ALT 57 05/08/2014    TP 7.0 01/04/2024    ALB 3.6 01/04/2024    ALB 4.4 05/08/2014      No results found for: \"SALICYLATE\"   Troponin APAP     No results found for: \"TROPONINI\"   No results found for: \"ACTMNPHEN\"   VBG HCG     No results found for: \"PHVEN\", \"QOK6EDV\", \"PO2VEN\", \"QGY8MMU\", \"BEVEN\", \"L1PGXNYFQ\", \"T1WPCNI\"   No results found for: \"HCGQUANT\"   ABG Urine Drug Screen     No results found for: \"PHART\", \"PMR3KQX\", \"PO2ART\", \"RPG3QYB\", \"BEART\", \"B5JJGJVSA\", \"O2HGB\", \"SOURC\", \"EDDI\", \"VTAC\", \"ACRATE\", \"INSPIREDAIR\", \"PEEP\"   No results found for: \"AMPMETHUR\", \"BARBTUR\", \"BDZUR\", \"COCAINEUR\", \"METHADONEUR\", \"OPIATEUR\", \"PCPUR\", \"THCUR\", \"OXYCODONEUR\"   Lactate INR     No results found for: \"LACTICACID\"   No results found for: \"INR\"   PTT Protime     No results found for: \"PTT\"     No results found for: \"PROTIME\"           Imaging Studies: I have personally reviewed pertinent reports.        Counseling / Coordination of Care  Total floor / unit time spent today 80 minutes. Greater than 50% of total time was spent with the patient and / or family counseling and / or coordination of care.       Minutes of critical care time 40  -Critical care time was exclusive of separately billable procedures and teaching time.   -Critical care was necessary to treat or prevent imminent or life-threatening deterioration of the following condition: CNS failure/compromise, toxidrome (ethanol withdrawal),  withdrawal, hepatic failure, renal failure, and dehydration  -Critical care time was spent personally by me on the following activities as well as the above as per the course and rest of chart: obtaining history from " patient/surrogate, development of a treatment plan, discussions with referring provider(s), evaluation of patient's response to the treatment, examination of the patient, performing treatments and interventions, re-evaluation of the patient's condition, review of old charts, ordering/interpreting laboratory studies, ordering/interpreting of radiographic studies.      ** Please Note: This note has been constructed using a voice recognition system. **

## 2024-01-04 NOTE — PLAN OF CARE
Problem: SUBSTANCE USE/ABUSE  Goal: By discharge, will develop insight into their chemical dependency and sustain motivation to continue in recovery  Description: INTERVENTIONS:  - Attends all daily group sessions and scheduled AA groups  - Actively practices coping skills through participation in the therapeutic community and adherence to program rules  - Reviews and completes assignments from individual treatment plan  - Assist patient development of understanding of their personal cycle of addiction and relapse triggers  Outcome: Progressing

## 2024-01-04 NOTE — ASSESSMENT & PLAN NOTE
"Recent Labs     01/04/24  1253 01/05/24  0800   * 114*   ALT 38 34     Recent Labs     01/05/24  1104   INR 1.14     CT abdomen/pelvis was obtained in the ED with findings of marked hepatomegaly with severe fatty infiltration and portal hypertension  RUQ US: \"Hepatomegaly and severe hepatic steatosis. Coexisting fibrotic or inflammatory changes not excluded\"  Likely secondary to patient's chronic alcohol use  Concern for underlying undiagnosed cirrhosis, as well, given findings of poral hypertension  GI consulted; appreciate recommendations  No indication for steroids at this time  Suspect possible element of alcoholic hepatitis  Will continue to monitor liver function closely  "

## 2024-01-04 NOTE — ASSESSMENT & PLAN NOTE
Continue SEWS protocol with symptom-triggered, as needed phenobarbital  Patient has received a total of 845 mg phenobarbital   Can continue symptom-triggered, as needed phenobarbital (130 mg IV mild-mod and 260 mg IV mod-severe withdrawal) and titrate to RASS -1 to -2 as patient likely will not be able to participate in CIWA  Maximum dosing of phenobarbital is around 2 grams  Patient's underlying liver dysfunction may decrease clearance of phenobarbital, prolonging its half-life and effects

## 2024-01-04 NOTE — ED PROVIDER NOTES
"History  Chief Complaint   Patient presents with    Alcohol Intoxication     Pt was brought by [facility] for evaluation of ETOH intoxication. Since the day before New Year's Nany pt drank a fifth of Amilcar's and 12 \"tall boys\" of beer. This nurse asked if pt wants detox evaluation and pt declined at this time.      Patient is a 48-year-old female.  She has a history of EtOH abuse.  Denies drug abuse.  As per patient, her son called the ambulance because he was concerned about her drinking.  She had been drinking today.  No trauma.  No fever.  No concomitant drug use.  She is not suicidal.  She declines drug and alcohol treatment at this time.  She is requesting Xanax or Ativan.        None       History reviewed. No pertinent past medical history.    Past Surgical History:   Procedure Laterality Date    KNEE SURGERY         History reviewed. No pertinent family history.  I have reviewed and agree with the history as documented.    E-Cigarette/Vaping     E-Cigarette/Vaping Substances     Social History     Tobacco Use    Smoking status: Every Day     Current packs/day: 0.25     Average packs/day: 0.3 packs/day for 6.0 years (1.5 ttl pk-yrs)     Types: Cigarettes     Start date: 1/1/2018    Smokeless tobacco: Never   Substance Use Topics    Alcohol use: Yes     Comment: Fifth of vodka and 12 tall boys    Drug use: Never       Review of Systems   Constitutional:  Negative for chills and fever.   HENT:  Negative for rhinorrhea and sore throat.    Eyes:  Negative for pain, redness and visual disturbance.   Respiratory:  Negative for cough and shortness of breath.    Cardiovascular:  Negative for chest pain and leg swelling.   Gastrointestinal:  Positive for abdominal pain. Negative for diarrhea and vomiting.   Endocrine: Negative for polydipsia and polyuria.   Genitourinary:  Negative for dysuria, frequency, hematuria, vaginal bleeding and vaginal discharge.   Musculoskeletal:  Negative for back pain and neck pain. "   Skin:  Negative for rash and wound.   Allergic/Immunologic: Negative for immunocompromised state.   Neurological:  Negative for weakness, numbness and headaches.   Hematological:  Does not bruise/bleed easily.   Psychiatric/Behavioral:  Negative for hallucinations and suicidal ideas.    All other systems reviewed and are negative.      Physical Exam  Physical Exam  Vitals reviewed.   Constitutional:       General: She is not in acute distress.  HENT:      Head: Normocephalic and atraumatic.      Nose: Nose normal.      Mouth/Throat:      Mouth: Mucous membranes are moist.   Eyes:      General:         Right eye: No discharge.         Left eye: No discharge.      Conjunctiva/sclera: Conjunctivae normal.   Cardiovascular:      Rate and Rhythm: Normal rate and regular rhythm.      Pulses: Normal pulses.      Heart sounds: Normal heart sounds. No murmur heard.     No friction rub. No gallop.   Pulmonary:      Effort: Pulmonary effort is normal. No respiratory distress.      Breath sounds: Normal breath sounds. No stridor. No wheezing, rhonchi or rales.   Abdominal:      General: Bowel sounds are normal. There is no distension.      Palpations: Abdomen is soft.      Tenderness: There is abdominal tenderness. There is no right CVA tenderness, left CVA tenderness, guarding or rebound.      Comments: There is moderate epigastric tenderness.   Musculoskeletal:         General: No swelling, tenderness, deformity or signs of injury. Normal range of motion.      Cervical back: Normal range of motion and neck supple. No rigidity.      Right lower leg: No edema.      Left lower leg: No edema.      Comments: No calf tenderness or unilateral leg swelling.   Skin:     General: Skin is warm and dry.      Coloration: Skin is not jaundiced.      Findings: No rash.   Neurological:      General: No focal deficit present.      Mental Status: She is alert and oriented to person, place, and time.      Sensory: No sensory deficit.       Motor: Motor function is intact.   Psychiatric:         Mood and Affect: Mood normal.         Behavior: Behavior normal.         Vital Signs  ED Triage Vitals   Temperature Pulse Respirations Blood Pressure SpO2   01/04/24 1151 01/04/24 1151 01/04/24 1151 01/04/24 1151 01/04/24 1151   (!) 97 °F (36.1 °C) 91 16 132/91 100 %      Temp Source Heart Rate Source Patient Position - Orthostatic VS BP Location FiO2 (%)   01/04/24 1151 01/04/24 1151 01/04/24 1151 01/04/24 1151 --   Tympanic Monitor Lying Left arm       Pain Score       01/04/24 1436       No Pain           Vitals:    01/04/24 1151 01/04/24 1436   BP: 132/91 130/77   Pulse: 91 88   Patient Position - Orthostatic VS: Lying Lying         Visual Acuity      ED Medications  Medications   thiamine (VITAMIN B1) 100 mg in sodium chloride 0.9 % 50 mL IVPB (has no administration in time range)   potassium chloride 20 mEq IVPB (premix) (20 mEq Intravenous New Bag 1/4/24 1442)   magnesium sulfate 2 g/50 mL IVPB (premix) 2 g (has no administration in time range)   diazepam (VALIUM) injection 5 mg (has no administration in time range)   iohexol (OMNIPAQUE) 350 MG/ML injection (MULTI-DOSE) 75 mL (75 mL Intravenous Given 1/4/24 1409)   diazepam (VALIUM) injection 5 mg (5 mg Intravenous Given 1/4/24 1435)       Diagnostic Studies  Results Reviewed       Procedure Component Value Units Date/Time    Basic metabolic panel [[id number]]  (Abnormal) Collected: 01/04/24 1434    Lab Status: Final result Specimen: Blood from Arm, Left Updated: 01/04/24 1504     Sodium 122 mmol/L      Potassium 3.0 mmol/L      Chloride 86 mmol/L      CO2 19 mmol/L      ANION GAP 17 mmol/L      BUN 3 mg/dL      Creatinine 0.44 mg/dL      Glucose 99 mg/dL      Calcium 7.8 mg/dL      eGFR 120 ml/min/1.73sq m     Narrative:      National Kid[employer] Disease Foundation guidelines for Chronic Kidney Disease (CKD):     Stage 1 with normal or high GFR (GFR > 90 mL/min/1.73 square meters)    Stage 2 Mild CKD (GFR =  60-89 mL/min/1.73 square meters)    Stage 3A Moderate CKD (GFR = 45-59 mL/min/1.73 square meters)    Stage 3B Moderate CKD (GFR = 30-44 mL/min/1.73 square meters)    Stage 4 Severe CKD (GFR = 15-29 mL/min/1.73 square meters)    Stage 5 End Stage CKD (GFR <15 mL/min/1.73 square meters)  Note: GFR calculation is accurate only with a steady state creatinine    Magnesium [[id number]]  (Abnormal) Collected: 01/04/24 1434    Lab Status: Final result Specimen: Blood from Arm, Left Updated: 01/04/24 1504     Magnesium 1.6 mg/dL     Beta Hydroxybutyrate [[id number]]  (Abnormal) Collected: 01/04/24 1434    Lab Status: Final result Specimen: Blood from Arm, Left Updated: 01/04/24 1449     BETA-HYDROXYBUTYRATE 2.8 mmol/L     Comprehensive metabolic panel [[id number]]  (Abnormal) Collected: 01/04/24 1253    Lab Status: Final result Specimen: Blood from Arm, Left Updated: 01/04/24 1342     Sodium 124 mmol/L      Potassium 2.4 mmol/L      Chloride 86 mmol/L      CO2 19 mmol/L      ANION GAP 19 mmol/L      BUN 3 mg/dL      Creatinine 0.47 mg/dL      Glucose 103 mg/dL      Calcium 8.4 mg/dL       U/L      ALT 38 U/L      Alkaline Phosphatase 253 U/L      Total Protein 7.0 g/dL      Albumin 3.6 g/dL      Total Bilirubin 3.67 mg/dL      eGFR 118 ml/min/1.73sq m     Narrative:      National Kidney Disease Foundation guidelines for Chronic Kidney Disease (CKD):     Stage 1 with normal or high GFR (GFR > 90 mL/min/1.73 square meters)    Stage 2 Mild CKD (GFR = 60-89 mL/min/1.73 square meters)    Stage 3A Moderate CKD (GFR = 45-59 mL/min/1.73 square meters)    Stage 3B Moderate CKD (GFR = 30-44 mL/min/1.73 square meters)    Stage 4 Severe CKD (GFR = 15-29 mL/min/1.73 square meters)    Stage 5 End Stage CKD (GFR <15 mL/min/1.73 square meters)  Note: GFR calculation is accurate only with a steady state creatinine    Lipase [[id number]]  (Abnormal) Collected: 01/04/24 4329    Lab Status: Final result Specimen: Blood from Arm, Left  Updated: 01/04/24 1340     Lipase <6 u/L     Ethanol [[id number]]  (Abnormal) Collected: 01/04/24 1253    Lab Status: Final result Specimen: Blood from Arm, Left Updated: 01/04/24 1327     Ethanol Lvl 80 mg/dL     CBC and differential [[id number]]  (Abnormal) Collected: 01/04/24 1253    Lab Status: Final result Specimen: Blood from Arm, Left Updated: 01/04/24 1317     WBC 7.69 Thousand/uL      RBC 3.26 Million/uL      Hemoglobin 10.5 g/dL      Hematocrit 28.8 %      MCV 88 fL      MCH 32.2 pg      MCHC 36.5 g/dL      RDW 12.6 %      MPV 11.4 fL      Platelets 117 Thousands/uL      nRBC 0 /100 WBCs      Neutrophils Relative 73 %      Immat GRANS % 0 %      Lymphocytes Relative 16 %      Monocytes Relative 11 %      Eosinophils Relative 0 %      Basophils Relative 0 %      Neutrophils Absolute 5.54 Thousands/µL      Immature Grans Absolute 0.02 Thousand/uL      Lymphocytes Absolute 1.21 Thousands/µL      Monocytes Absolute 0.87 Thousand/µL      Eosinophils Absolute 0.02 Thousand/µL      Basophils Absolute 0.03 Thousands/µL                    CT abdomen pelvis with contrast   Final Result by Shivam Arellano MD (01/04 1450)      Marked hepatomegaly with severe fatty infiltration. Portal hypertension.      Uncomplicated colonic diverticulosis.      Diffuse osteopenia, advanced for stated age.            Workstation performed: [id number]                    Procedures  ECG 12 Lead Documentation Only    Date/Time: 1/4/2024 1:10 PM    Performed by: Darren So MD  Authorized by: Darren So MD    ECG reviewed by me, the ED Provider: yes    Patient location:  ED  Interpretation:     Interpretation: abnormal    Rate:     ECG rate assessment: normal    Rhythm:     Rhythm: sinus rhythm    Ectopy:     Ectopy: none    QRS:     QRS axis:  Normal  Conduction:     Conduction: normal    ST segments:     ST segments:  Normal  T waves:     T waves: normal    Other findings:     Other findings: prolonged qTc interval             ED  Course                                             Medical Decision Making  Initially patient refused drug and alcohol treatment.  Later during her ED stay, she became amendable to admission for detox.  Consulted with medical toxicology.  Patient accepted for admission to medical detox.  She did receive Valium in the emergency room for withdrawal symptoms.  She also received thiamine.  Patient did complain of abdominal pain during her ED stay.  CT of the abdomen and pelvis was nonspecific.  She was found to have multiple electrolyte abnormalities.  Potassium and magnesium replacement was ordered.  She did not have pancreatitis.  There was no perforation.  Patient may have alcoholic gastritis.  There was no appendicitis, acute cholecystitis, AAA, acute coronary syndrome, small bowel obstruction, obstructive uropathy, acute diverticulitis, or other causes of abdominal pain.    Amount and/or Complexity of Data Reviewed  Labs: ordered. Decision-making details documented in ED Course.  Radiology: ordered. Decision-making details documented in ED Course.  ECG/medicine tests: ordered and independent interpretation performed. Decision-making details documented in ED Course.  Discussion of management or test interpretation with external provider(s): Medical toxicology    Risk  Prescription drug management.  Decision regarding hospitalization.  Diagnosis or treatment significantly limited by social determinants of health.             Disposition  Final diagnoses:   Alcohol abuse   Alcohol withdrawal (HCC)   Hypokalemia   Hypomagnesemia   Hyponatremia   Hypochloremia   Anemia   Hyperbilirubinemia     Time reflects when diagnosis was documented in both MDM as applicable and the Disposition within this note       Time User Action Codes Description Comment    1/4/2024  3:32 PM Darren So Add [F10.10] Alcohol abuse     1/4/2024  3:32 PM Darren So Add [F10.939] Alcohol withdrawal (HCC)     1/4/2024  3:32 PM Judah  Darren J Add [E87.6] Hypokalemia     1/4/2024  3:32 PM JudahDarren carrillo [E83.42] Hypomagnesemia     1/4/2024  3:32 PM Darren So [E87.1] Hyponatremia     1/4/2024  3:32 PM Darren So [E87.8] Hypochloremia     1/4/2024  3:32 PM Darren So [D64.9] Anemia     1/4/2024  3:33 PM Darren So [E80.6] Hyperbilirubinemia           ED Disposition       ED Disposition   Admit    Condition   Stable    Date/Time   Thu Jan 4, 2024  3:32 PM    Comment                  Follow-up Information    None         Patient's Medications    No medications on file       No discharge procedures on file.    PDMP Review       None            ED Provider  Electronically Signed by             Darren So MD  01/04/24 0272

## 2024-01-04 NOTE — ASSESSMENT & PLAN NOTE
Recent Labs     01/04/24  1253 01/04/24  1434 01/05/24  0107 01/05/24  0800 01/05/24  1400   K 2.4* 3.0* 2.1* 2.6*  3.1* 3.1*     Receiving IV replacement with frequent monitoring  Continue to closely monitor

## 2024-01-04 NOTE — ASSESSMENT & PLAN NOTE
Recent Labs     01/04/24  1253 01/04/24  1434 01/05/24  0107 01/05/24  0800 01/05/24  1400   SODIUM 124* 122* 130* 131*  130* 132*     Patient received small amount of fluid resuscitation in ED  Nephrology consulted; appreciate recommendations  Close BMP monitoring  Target 132 by this afternoon and 136 by tomorrow afternoon  Nephrology following  Received 500 cc bolus D5LR this afternoon due to concern for ongoing dehydration and mild AKA  Fluid restriction 1.8L  Strict I&Os  Suspect secondary to chronic alcohol use/beer potomania but likely complicated by underlying liver disease  Continue to monitor

## 2024-01-04 NOTE — ASSESSMENT & PLAN NOTE
Continue daily vitamin supplementation including high dose thiamine 500 mg IV every 8 hours x 9 doses  Unclear if patient desires naltrexone or would be a good candidate given underlying liver disease  Case management consulted for disposition planning  Patient to be transferred to [facility] for higher level of care and endoscopy; case management consultation there would be beneficial prior to discharge

## 2024-01-04 NOTE — ASSESSMENT & PLAN NOTE
Recent Labs     01/04/24  1434 01/05/24  0800   MG 1.6* 2.1     Improved with IV replacement  Continue to monitor

## 2024-01-05 ENCOUNTER — APPOINTMENT (INPATIENT)
Dept: ULTRASOUND IMAGING | Facility: HOSPITAL | Age: 49
End: 2024-01-05
Payer: COMMERCIAL

## 2024-01-05 ENCOUNTER — APPOINTMENT (INPATIENT)
Dept: INTERVENTIONAL RADIOLOGY/VASCULAR | Facility: HOSPITAL | Age: 49
End: 2024-01-05
Payer: COMMERCIAL

## 2024-01-05 ENCOUNTER — HOSPITAL ENCOUNTER (INPATIENT)
Facility: HOSPITAL | Age: 49
LOS: 15 days | Discharge: HOME | DRG: 253 | End: 2024-01-20
Attending: INTERNAL MEDICINE | Admitting: INTERNAL MEDICINE
Payer: COMMERCIAL

## 2024-01-05 ENCOUNTER — APPOINTMENT (INPATIENT)
Dept: CT IMAGING | Facility: HOSPITAL | Age: 49
End: 2024-01-05
Payer: COMMERCIAL

## 2024-01-05 VITALS
HEIGHT: 60 IN | HEART RATE: 94 BPM | TEMPERATURE: 97.9 F | RESPIRATION RATE: 18 BRPM | WEIGHT: 93 LBS | DIASTOLIC BLOOD PRESSURE: 78 MMHG | OXYGEN SATURATION: 100 % | SYSTOLIC BLOOD PRESSURE: 105 MMHG | BODY MASS INDEX: 18.26 KG/M2

## 2024-01-05 DIAGNOSIS — K92.0 HEMATEMESIS: ICD-10-CM

## 2024-01-05 DIAGNOSIS — E87.8 ELECTROLYTE ABNORMALITY: ICD-10-CM

## 2024-01-05 DIAGNOSIS — K76.82 HEPATIC ENCEPHALOPATHY (HCC): ICD-10-CM

## 2024-01-05 DIAGNOSIS — F10.939 ALCOHOL WITHDRAWAL WITH COMPLICATION WITH INPATIENT TREATMENT (HCC): Primary | ICD-10-CM

## 2024-01-05 DIAGNOSIS — K76.0 HEPATIC STEATOSIS: ICD-10-CM

## 2024-01-05 DIAGNOSIS — K29.61 GASTROINTESTINAL HEMORRHAGE ASSOCIATED WITH OTHER GASTRITIS: ICD-10-CM

## 2024-01-05 DIAGNOSIS — E83.42 HYPOMAGNESEMIA: ICD-10-CM

## 2024-01-05 DIAGNOSIS — F41.9 ANXIETY: ICD-10-CM

## 2024-01-05 DIAGNOSIS — F10.20 ALCOHOL USE DISORDER, SEVERE, DEPENDENCE (HCC): ICD-10-CM

## 2024-01-05 PROBLEM — E44.1 MILD PROTEIN-CALORIE MALNUTRITION (HCC): Status: ACTIVE | Noted: 2024-01-05

## 2024-01-05 PROBLEM — K70.30 CIRRHOSIS, ALCOHOLIC (HCC): Status: ACTIVE | Noted: 2024-01-05

## 2024-01-05 PROBLEM — E43 SEVERE PROTEIN-CALORIE MALNUTRITION (HCC): Chronic | Status: ACTIVE | Noted: 2024-01-05

## 2024-01-05 PROBLEM — K92.2 GI BLEED: Status: ACTIVE | Noted: 2024-01-05

## 2024-01-05 PROBLEM — E83.39 HYPOPHOSPHATEMIA: Status: ACTIVE | Noted: 2024-01-05

## 2024-01-05 PROBLEM — M85.80 OSTEOPENIA: Status: ACTIVE | Noted: 2024-01-05

## 2024-01-05 PROBLEM — E87.29 ALCOHOLIC KETOACIDOSIS: Status: ACTIVE | Noted: 2024-01-05

## 2024-01-05 LAB
ABO GROUP BLD: NORMAL
ALBUMIN SERPL BCP-MCNC: 3.1 G/DL (ref 3.5–5)
ALBUMIN SERPL BCP-MCNC: 3.6 G/DL (ref 3.5–5)
ALP SERPL-CCNC: 222 U/L (ref 34–104)
ALP SERPL-CCNC: 266 U/L (ref 34–104)
ALT SERPL W P-5'-P-CCNC: 29 U/L (ref 7–52)
ALT SERPL W P-5'-P-CCNC: 34 U/L (ref 7–52)
AMMONIA PLAS-SCNC: 178 UMOL/L (ref 18–72)
ANION GAP SERPL CALCULATED.3IONS-SCNC: 11 MMOL/L
ANION GAP SERPL CALCULATED.3IONS-SCNC: 14 MMOL/L
ANION GAP SERPL CALCULATED.3IONS-SCNC: 15 MMOL/L
ANION GAP SERPL CALCULATED.3IONS-SCNC: 15 MMOL/L
ANION GAP SERPL CALCULATED.3IONS-SCNC: 8 MMOL/L
AST SERPL W P-5'-P-CCNC: 114 U/L (ref 13–39)
AST SERPL W P-5'-P-CCNC: 78 U/L (ref 13–39)
BACTERIA UR QL AUTO: ABNORMAL /HPF
BASOPHILS # BLD AUTO: 0.03 THOUSANDS/ÂΜL (ref 0–0.1)
BASOPHILS NFR BLD AUTO: 0 % (ref 0–1)
BILIRUB SERPL-MCNC: 4.37 MG/DL (ref 0.2–1)
BILIRUB SERPL-MCNC: 4.8 MG/DL (ref 0.2–1)
BILIRUB UR QL STRIP: NEGATIVE
BLD GP AB SCN SERPL QL: NEGATIVE
BUN SERPL-MCNC: 3 MG/DL (ref 5–25)
BUN SERPL-MCNC: 4 MG/DL (ref 5–25)
BUN SERPL-MCNC: 4 MG/DL (ref 5–25)
CALCIUM ALBUM COR SERPL-MCNC: 8.9 MG/DL (ref 8.3–10.1)
CALCIUM SERPL-MCNC: 7.8 MG/DL (ref 8.4–10.2)
CALCIUM SERPL-MCNC: 8 MG/DL (ref 8.4–10.2)
CALCIUM SERPL-MCNC: 8 MG/DL (ref 8.4–10.2)
CALCIUM SERPL-MCNC: 8.2 MG/DL (ref 8.4–10.2)
CALCIUM SERPL-MCNC: 8.2 MG/DL (ref 8.4–10.2)
CHLORIDE SERPL-SCNC: 102 MMOL/L (ref 96–108)
CHLORIDE SERPL-SCNC: 109 MMOL/L (ref 96–108)
CHLORIDE SERPL-SCNC: 94 MMOL/L (ref 96–108)
CHLORIDE SERPL-SCNC: 96 MMOL/L (ref 96–108)
CHLORIDE SERPL-SCNC: 96 MMOL/L (ref 96–108)
CLARITY UR: ABNORMAL
CO2 SERPL-SCNC: 17 MMOL/L (ref 21–32)
CO2 SERPL-SCNC: 19 MMOL/L (ref 21–32)
CO2 SERPL-SCNC: 20 MMOL/L (ref 21–32)
CO2 SERPL-SCNC: 20 MMOL/L (ref 21–32)
CO2 SERPL-SCNC: 21 MMOL/L (ref 21–32)
COLOR UR: ABNORMAL
CREAT SERPL-MCNC: 0.41 MG/DL (ref 0.6–1.3)
CREAT SERPL-MCNC: 0.41 MG/DL (ref 0.6–1.3)
CREAT SERPL-MCNC: 0.45 MG/DL (ref 0.6–1.3)
CREAT SERPL-MCNC: 0.47 MG/DL (ref 0.6–1.3)
CREAT SERPL-MCNC: 0.47 MG/DL (ref 0.6–1.3)
EOSINOPHIL # BLD AUTO: 0.03 THOUSAND/ÂΜL (ref 0–0.61)
EOSINOPHIL NFR BLD AUTO: 0 % (ref 0–6)
ERYTHROCYTE [DISTWIDTH] IN BLOOD BY AUTOMATED COUNT: 12.6 % (ref 11.6–15.1)
ERYTHROCYTE [DISTWIDTH] IN BLOOD BY AUTOMATED COUNT: 13 % (ref 11.6–15.1)
GFR SERPL CREATININE-BSD FRML MDRD: 118 ML/MIN/1.73SQ M
GFR SERPL CREATININE-BSD FRML MDRD: 118 ML/MIN/1.73SQ M
GFR SERPL CREATININE-BSD FRML MDRD: 119 ML/MIN/1.73SQ M
GFR SERPL CREATININE-BSD FRML MDRD: 123 ML/MIN/1.73SQ M
GFR SERPL CREATININE-BSD FRML MDRD: 123 ML/MIN/1.73SQ M
GLUCOSE SERPL-MCNC: 103 MG/DL (ref 65–140)
GLUCOSE SERPL-MCNC: 117 MG/DL (ref 65–140)
GLUCOSE SERPL-MCNC: 131 MG/DL (ref 65–140)
GLUCOSE SERPL-MCNC: 133 MG/DL (ref 65–140)
GLUCOSE SERPL-MCNC: 97 MG/DL (ref 65–140)
GLUCOSE UR STRIP-MCNC: NEGATIVE MG/DL
HCT VFR BLD AUTO: 25.8 % (ref 34.8–46.1)
HCT VFR BLD AUTO: 26.2 % (ref 34.8–46.1)
HCT VFR BLD AUTO: 31.2 % (ref 34.8–46.1)
HGB BLD-MCNC: 11.1 G/DL (ref 11.5–15.4)
HGB BLD-MCNC: 9.2 G/DL (ref 11.5–15.4)
HGB BLD-MCNC: 9.3 G/DL (ref 11.5–15.4)
HGB UR QL STRIP.AUTO: NEGATIVE
IMM GRANULOCYTES # BLD AUTO: 0.06 THOUSAND/UL (ref 0–0.2)
IMM GRANULOCYTES NFR BLD AUTO: 1 % (ref 0–2)
INR PPP: 1.14 (ref 0.84–1.19)
INR PPP: 1.19 (ref 0.84–1.19)
KETONES UR STRIP-MCNC: NEGATIVE MG/DL
LEUKOCYTE ESTERASE UR QL STRIP: 25
LIPASE SERPL-CCNC: <6 U/L (ref 11–82)
LYMPHOCYTES # BLD AUTO: 1.7 THOUSANDS/ÂΜL (ref 0.6–4.47)
LYMPHOCYTES NFR BLD AUTO: 21 % (ref 14–44)
MAGNESIUM SERPL-MCNC: 1.7 MG/DL (ref 1.9–2.7)
MAGNESIUM SERPL-MCNC: 1.9 MG/DL (ref 1.9–2.7)
MAGNESIUM SERPL-MCNC: 2.1 MG/DL (ref 1.9–2.7)
MCH RBC QN AUTO: 31.7 PG (ref 26.8–34.3)
MCH RBC QN AUTO: 32 PG (ref 26.8–34.3)
MCHC RBC AUTO-ENTMCNC: 35.1 G/DL (ref 31.4–37.4)
MCHC RBC AUTO-ENTMCNC: 35.6 G/DL (ref 31.4–37.4)
MCV RBC AUTO: 90 FL (ref 82–98)
MCV RBC AUTO: 90 FL (ref 82–98)
MONOCYTES # BLD AUTO: 0.98 THOUSAND/ÂΜL (ref 0.17–1.22)
MONOCYTES NFR BLD AUTO: 12 % (ref 4–12)
NEUTROPHILS # BLD AUTO: 5.24 THOUSANDS/ÂΜL (ref 1.85–7.62)
NEUTS SEG NFR BLD AUTO: 66 % (ref 43–75)
NITRITE UR QL STRIP: NEGATIVE
NON-SQ EPI CELLS URNS QL MICRO: ABNORMAL /HPF
NRBC BLD AUTO-RTO: 0 /100 WBCS
PH UR STRIP.AUTO: 8 [PH]
PHOSPHATE SERPL-MCNC: <1 MG/DL (ref 2.7–4.5)
PHOSPHATE SERPL-MCNC: <1 MG/DL (ref 2.7–4.5)
PLATELET # BLD AUTO: 109 THOUSANDS/UL (ref 149–390)
PLATELET # BLD AUTO: 121 THOUSANDS/UL (ref 149–390)
PMV BLD AUTO: 10.8 FL (ref 8.9–12.7)
PMV BLD AUTO: 9.9 FL (ref 8.9–12.7)
POTASSIUM SERPL-SCNC: 2.1 MMOL/L (ref 3.5–5.3)
POTASSIUM SERPL-SCNC: 2.6 MMOL/L (ref 3.5–5.3)
POTASSIUM SERPL-SCNC: 3.1 MMOL/L (ref 3.5–5.3)
POTASSIUM SERPL-SCNC: 3.1 MMOL/L (ref 3.5–5.3)
POTASSIUM SERPL-SCNC: 3.6 MMOL/L (ref 3.5–5.3)
PROT SERPL-MCNC: 6 G/DL (ref 6.4–8.4)
PROT SERPL-MCNC: 7.2 G/DL (ref 6.4–8.4)
PROT UR STRIP-MCNC: NEGATIVE MG/DL
PROTHROMBIN TIME: 14.9 SECONDS (ref 11.6–14.5)
PROTHROMBIN TIME: 15.3 SECONDS (ref 11.6–14.5)
RBC # BLD AUTO: 2.9 MILLION/UL (ref 3.81–5.12)
RBC # BLD AUTO: 3.47 MILLION/UL (ref 3.81–5.12)
RBC #/AREA URNS AUTO: ABNORMAL /HPF
RH BLD: NEGATIVE
SODIUM SERPL-SCNC: 130 MMOL/L (ref 135–147)
SODIUM SERPL-SCNC: 130 MMOL/L (ref 135–147)
SODIUM SERPL-SCNC: 131 MMOL/L (ref 135–147)
SODIUM SERPL-SCNC: 132 MMOL/L (ref 135–147)
SODIUM SERPL-SCNC: 134 MMOL/L (ref 135–147)
SP GR UR STRIP.AUTO: 1.01 (ref 1–1.04)
SPECIMEN EXPIRATION DATE: NORMAL
UROBILINOGEN UA: 1 MG/DL
WBC # BLD AUTO: 8.04 THOUSAND/UL (ref 4.31–10.16)
WBC # BLD AUTO: 8.18 THOUSAND/UL (ref 4.31–10.16)
WBC #/AREA URNS AUTO: ABNORMAL /HPF

## 2024-01-05 PROCEDURE — 83735 ASSAY OF MAGNESIUM: CPT | Performed by: NURSE PRACTITIONER

## 2024-01-05 PROCEDURE — 83735 ASSAY OF MAGNESIUM: CPT | Performed by: INTERNAL MEDICINE

## 2024-01-05 PROCEDURE — 86901 BLOOD TYPING SEROLOGIC RH(D): CPT | Performed by: NURSE PRACTITIONER

## 2024-01-05 PROCEDURE — 85014 HEMATOCRIT: CPT | Performed by: EMERGENCY MEDICINE

## 2024-01-05 PROCEDURE — 99239 HOSP IP/OBS DSCHRG MGMT >30: CPT | Performed by: EMERGENCY MEDICINE

## 2024-01-05 PROCEDURE — 99291 CRITICAL CARE FIRST HOUR: CPT | Performed by: NURSE PRACTITIONER

## 2024-01-05 PROCEDURE — 05HC33Z INSERTION OF INFUSION DEVICE INTO LEFT BASILIC VEIN, PERCUTANEOUS APPROACH: ICD-10-PCS | Performed by: EMERGENCY MEDICINE

## 2024-01-05 PROCEDURE — 87077 CULTURE AEROBIC IDENTIFY: CPT | Performed by: EMERGENCY MEDICINE

## 2024-01-05 PROCEDURE — 86850 RBC ANTIBODY SCREEN: CPT | Performed by: NURSE PRACTITIONER

## 2024-01-05 PROCEDURE — 86900 BLOOD TYPING SEROLOGIC ABO: CPT | Performed by: NURSE PRACTITIONER

## 2024-01-05 PROCEDURE — 83690 ASSAY OF LIPASE: CPT | Performed by: NURSE PRACTITIONER

## 2024-01-05 PROCEDURE — 87186 SC STD MICRODIL/AGAR DIL: CPT | Performed by: EMERGENCY MEDICINE

## 2024-01-05 PROCEDURE — 80048 BASIC METABOLIC PNL TOTAL CA: CPT | Performed by: PHYSICIAN ASSISTANT

## 2024-01-05 PROCEDURE — C1751 CATH, INF, PER/CENT/MIDLINE: HCPCS

## 2024-01-05 PROCEDURE — 36573 INSJ PICC RS&I 5 YR+: CPT

## 2024-01-05 PROCEDURE — 85610 PROTHROMBIN TIME: CPT | Performed by: PHYSICIAN ASSISTANT

## 2024-01-05 PROCEDURE — 85027 COMPLETE CBC AUTOMATED: CPT | Performed by: PHYSICIAN ASSISTANT

## 2024-01-05 PROCEDURE — 76705 ECHO EXAM OF ABDOMEN: CPT

## 2024-01-05 PROCEDURE — 81001 URINALYSIS AUTO W/SCOPE: CPT | Performed by: PHYSICIAN ASSISTANT

## 2024-01-05 PROCEDURE — 97167 OT EVAL HIGH COMPLEX 60 MIN: CPT

## 2024-01-05 PROCEDURE — C9113 INJ PANTOPRAZOLE SODIUM, VIA: HCPCS | Performed by: PHYSICIAN ASSISTANT

## 2024-01-05 PROCEDURE — 80053 COMPREHEN METABOLIC PANEL: CPT | Performed by: PHYSICIAN ASSISTANT

## 2024-01-05 PROCEDURE — G1004 CDSM NDSC: HCPCS

## 2024-01-05 PROCEDURE — 85025 COMPLETE CBC W/AUTO DIFF WBC: CPT | Performed by: NURSE PRACTITIONER

## 2024-01-05 PROCEDURE — 83735 ASSAY OF MAGNESIUM: CPT | Performed by: PHYSICIAN ASSISTANT

## 2024-01-05 PROCEDURE — C9113 INJ PANTOPRAZOLE SODIUM, VIA: HCPCS | Performed by: NURSE PRACTITIONER

## 2024-01-05 PROCEDURE — 84100 ASSAY OF PHOSPHORUS: CPT | Performed by: INTERNAL MEDICINE

## 2024-01-05 PROCEDURE — 99254 IP/OBS CNSLTJ NEW/EST MOD 60: CPT | Performed by: INTERNAL MEDICINE

## 2024-01-05 PROCEDURE — 85610 PROTHROMBIN TIME: CPT | Performed by: NURSE PRACTITIONER

## 2024-01-05 PROCEDURE — 82140 ASSAY OF AMMONIA: CPT | Performed by: PHYSICIAN ASSISTANT

## 2024-01-05 PROCEDURE — 84100 ASSAY OF PHOSPHORUS: CPT | Performed by: NURSE PRACTITIONER

## 2024-01-05 PROCEDURE — 85018 HEMOGLOBIN: CPT | Performed by: EMERGENCY MEDICINE

## 2024-01-05 PROCEDURE — 70450 CT HEAD/BRAIN W/O DYE: CPT

## 2024-01-05 PROCEDURE — 87147 CULTURE TYPE IMMUNOLOGIC: CPT | Performed by: EMERGENCY MEDICINE

## 2024-01-05 PROCEDURE — 80048 BASIC METABOLIC PNL TOTAL CA: CPT | Performed by: INTERNAL MEDICINE

## 2024-01-05 PROCEDURE — 81003 URINALYSIS AUTO W/O SCOPE: CPT | Performed by: PHYSICIAN ASSISTANT

## 2024-01-05 PROCEDURE — 86920 COMPATIBILITY TEST SPIN: CPT

## 2024-01-05 PROCEDURE — 87086 URINE CULTURE/COLONY COUNT: CPT | Performed by: EMERGENCY MEDICINE

## 2024-01-05 PROCEDURE — 80053 COMPREHEN METABOLIC PANEL: CPT | Performed by: NURSE PRACTITIONER

## 2024-01-05 RX ORDER — FOLIC ACID 1 MG/1
1 TABLET ORAL DAILY
Status: DISCONTINUED | OUTPATIENT
Start: 2024-01-06 | End: 2024-01-06

## 2024-01-05 RX ORDER — ENOXAPARIN SODIUM 100 MG/ML
40 INJECTION SUBCUTANEOUS DAILY
Status: DISCONTINUED | OUTPATIENT
Start: 2024-01-06 | End: 2024-01-07

## 2024-01-05 RX ORDER — PHENOBARBITAL SODIUM 65 MG/ML
130 INJECTION, SOLUTION INTRAMUSCULAR; INTRAVENOUS ONCE
Status: COMPLETED | OUTPATIENT
Start: 2024-01-05 | End: 2024-01-05

## 2024-01-05 RX ORDER — POTASSIUM CHLORIDE 20 MEQ/1
40 TABLET, EXTENDED RELEASE ORAL ONCE
Status: DISCONTINUED | OUTPATIENT
Start: 2024-01-05 | End: 2024-01-05

## 2024-01-05 RX ORDER — PHENOBARBITAL SODIUM 130 MG/ML
130 INJECTION INTRAMUSCULAR ONCE
Status: COMPLETED | OUTPATIENT
Start: 2024-01-05 | End: 2024-01-05

## 2024-01-05 RX ORDER — POTASSIUM CHLORIDE 14.9 MG/ML
20 INJECTION INTRAVENOUS ONCE
Status: COMPLETED | OUTPATIENT
Start: 2024-01-05 | End: 2024-01-06

## 2024-01-05 RX ORDER — FOLIC ACID 1 MG/1
1 TABLET ORAL DAILY
Status: CANCELLED | OUTPATIENT
Start: 2024-01-06

## 2024-01-05 RX ORDER — PANTOPRAZOLE SODIUM 40 MG/10ML
40 INJECTION, POWDER, LYOPHILIZED, FOR SOLUTION INTRAVENOUS EVERY 12 HOURS SCHEDULED
Status: CANCELLED | OUTPATIENT
Start: 2024-01-05

## 2024-01-05 RX ORDER — POTASSIUM CHLORIDE 14.9 MG/ML
20 INJECTION INTRAVENOUS
Qty: 200 ML | Refills: 0 | Status: COMPLETED | OUTPATIENT
Start: 2024-01-05 | End: 2024-01-05

## 2024-01-05 RX ORDER — POTASSIUM CHLORIDE 20 MEQ/1
40 TABLET, EXTENDED RELEASE ORAL ONCE
Status: COMPLETED | OUTPATIENT
Start: 2024-01-05 | End: 2024-01-05

## 2024-01-05 RX ORDER — PANTOPRAZOLE SODIUM 40 MG/10ML
40 INJECTION, POWDER, LYOPHILIZED, FOR SOLUTION INTRAVENOUS EVERY 12 HOURS SCHEDULED
Status: DISCONTINUED | OUTPATIENT
Start: 2024-01-05 | End: 2024-01-05 | Stop reason: HOSPADM

## 2024-01-05 RX ORDER — POTASSIUM CHLORIDE 14.9 MG/ML
20 INJECTION INTRAVENOUS
Status: CANCELLED | OUTPATIENT
Start: 2024-01-05 | End: 2024-01-05

## 2024-01-05 RX ORDER — POTASSIUM CHLORIDE 14.9 MG/ML
20 INJECTION INTRAVENOUS
Status: COMPLETED | OUTPATIENT
Start: 2024-01-05 | End: 2024-01-05

## 2024-01-05 RX ORDER — CHLORHEXIDINE GLUCONATE ORAL RINSE 1.2 MG/ML
15 SOLUTION DENTAL EVERY 12 HOURS SCHEDULED
Status: DISCONTINUED | OUTPATIENT
Start: 2024-01-05 | End: 2024-01-18

## 2024-01-05 RX ORDER — ONDANSETRON 2 MG/ML
4 INJECTION INTRAMUSCULAR; INTRAVENOUS EVERY 6 HOURS PRN
Status: DISCONTINUED | OUTPATIENT
Start: 2024-01-05 | End: 2024-01-20 | Stop reason: HOSPADM

## 2024-01-05 RX ORDER — MAGNESIUM SULFATE HEPTAHYDRATE 40 MG/ML
2 INJECTION, SOLUTION INTRAVENOUS ONCE
Status: COMPLETED | OUTPATIENT
Start: 2024-01-05 | End: 2024-01-05

## 2024-01-05 RX ORDER — NICOTINE 21 MG/24HR
14 PATCH, TRANSDERMAL 24 HOURS TRANSDERMAL DAILY
Status: DISCONTINUED | OUTPATIENT
Start: 2024-01-05 | End: 2024-01-20 | Stop reason: HOSPADM

## 2024-01-05 RX ORDER — ONDANSETRON 2 MG/ML
4 INJECTION INTRAMUSCULAR; INTRAVENOUS EVERY 6 HOURS PRN
Status: CANCELLED | OUTPATIENT
Start: 2024-01-05

## 2024-01-05 RX ORDER — PANTOPRAZOLE SODIUM 40 MG/10ML
40 INJECTION, POWDER, LYOPHILIZED, FOR SOLUTION INTRAVENOUS EVERY 12 HOURS SCHEDULED
Status: DISCONTINUED | OUTPATIENT
Start: 2024-01-05 | End: 2024-01-11

## 2024-01-05 RX ADMIN — PANTOPRAZOLE SODIUM 40 MG: 40 INJECTION, POWDER, FOR SOLUTION INTRAVENOUS at 20:50

## 2024-01-05 RX ADMIN — MAGNESIUM SULFATE HEPTAHYDRATE 2 G: 40 INJECTION, SOLUTION INTRAVENOUS at 21:15

## 2024-01-05 RX ADMIN — PHENOBARBITAL SODIUM 130 MG: 130 INJECTION INTRAMUSCULAR; INTRAVENOUS at 15:11

## 2024-01-05 RX ADMIN — DEXTROSE 250 ML: 5 SOLUTION INTRAVENOUS at 21:15

## 2024-01-05 RX ADMIN — PHENOBARBITAL SODIUM 130 MG: 130 INJECTION INTRAMUSCULAR at 14:14

## 2024-01-05 RX ADMIN — POTASSIUM CHLORIDE 20 MEQ: 14.9 INJECTION, SOLUTION INTRAVENOUS at 04:18

## 2024-01-05 RX ADMIN — CHLORHEXIDINE GLUCONATE 0.12% ORAL RINSE 15 ML: 1.2 LIQUID ORAL at 20:51

## 2024-01-05 RX ADMIN — THIAMINE HYDROCHLORIDE 500 MG: 100 INJECTION, SOLUTION INTRAMUSCULAR; INTRAVENOUS at 13:51

## 2024-01-05 RX ADMIN — POTASSIUM CHLORIDE 20 MEQ: 14.9 INJECTION, SOLUTION INTRAVENOUS at 02:20

## 2024-01-05 RX ADMIN — PHENOBARBITAL SODIUM 130 MG: 65 INJECTION INTRAMUSCULAR; INTRAVENOUS at 21:15

## 2024-01-05 RX ADMIN — DEXTROSE, SODIUM CHLORIDE, SODIUM LACTATE, POTASSIUM CHLORIDE, AND CALCIUM CHLORIDE 500 ML: 5; .6; .31; .03; .02 INJECTION, SOLUTION INTRAVENOUS at 13:06

## 2024-01-05 RX ADMIN — THIAMINE HYDROCHLORIDE 500 MG: 100 INJECTION, SOLUTION INTRAMUSCULAR; INTRAVENOUS at 08:04

## 2024-01-05 RX ADMIN — POTASSIUM CHLORIDE 20 MEQ: 14.9 INJECTION, SOLUTION INTRAVENOUS at 17:17

## 2024-01-05 RX ADMIN — PHENOBARBITAL SODIUM 130 MG: 130 INJECTION INTRAMUSCULAR; INTRAVENOUS at 02:01

## 2024-01-05 RX ADMIN — PHENOBARBITAL SODIUM 130 MG: 130 INJECTION INTRAMUSCULAR; INTRAVENOUS at 08:30

## 2024-01-05 RX ADMIN — POTASSIUM CHLORIDE 40 MEQ: 1500 TABLET, EXTENDED RELEASE ORAL at 02:20

## 2024-01-05 RX ADMIN — POTASSIUM CHLORIDE 40 MEQ: 1500 TABLET, EXTENDED RELEASE ORAL at 09:56

## 2024-01-05 RX ADMIN — PHENOBARBITAL SODIUM 130 MG: 130 INJECTION INTRAMUSCULAR; INTRAVENOUS at 11:35

## 2024-01-05 RX ADMIN — MULTIPLE VITAMINS W/ MINERALS TAB 1 TABLET: TAB ORAL at 08:30

## 2024-01-05 RX ADMIN — NICOTINE 1 PATCH: 7 PATCH, EXTENDED RELEASE TRANSDERMAL at 08:30

## 2024-01-05 RX ADMIN — Medication 14 MG: at 20:51

## 2024-01-05 RX ADMIN — POTASSIUM CHLORIDE 20 MEQ: 14.9 INJECTION, SOLUTION INTRAVENOUS at 14:58

## 2024-01-05 RX ADMIN — FOLIC ACID 1 MG: 1 TABLET ORAL at 08:30

## 2024-01-05 RX ADMIN — PANTOPRAZOLE SODIUM 40 MG: 40 INJECTION, POWDER, FOR SOLUTION INTRAVENOUS at 09:56

## 2024-01-05 RX ADMIN — POTASSIUM CHLORIDE 40 MEQ: 1500 TABLET, EXTENDED RELEASE ORAL at 08:30

## 2024-01-05 NOTE — NURSING NOTE
Multiple ED RN's have attempts ultrasound guided IV access with no success.  Herminia Urbano PA-C notified via face-to-face conversation.  JOLENE is now reaching out to ED providers for access.

## 2024-01-05 NOTE — PROGRESS NOTES
Progress Note - Medical Toxicology    Leslye Holland 48 y.o. female MRN: [id number]  Unit/Bed#: 5T DETOX 510-01 Encounter: [id number]  MEDICAL DETOX UNIT, LEVEL 4  Department of Medical Toxicology  Reason for Admission/Principal Problem: Alcohol Withdrawal Syndrome, Alcohol Use Disorder, Hyponatremia, Hypokalemia, and Concerns for GI Bleed, Hepatic Encephalopathy, and Cirrhosis.  Rounding Provider: Hay Hooks MS3; Jenifer Victoria MD      Hepatic encephalopathy (HCC)  Assessment & Plan  Patient increasingly confused this morning and uncooperative with exams  Elevated ammonia this morning at 178.  Continue to monitor LFTs and ammonia    Cirrhosis, alcoholic (HCC)  Assessment & Plan  Concern for cirrhosis  Elevated protime of 14.9  Elevated ammonia of 178  Patient says she was told at [facility] that she had cirrhosis previously but unable to verify this in chart.  Continue to monitor LFTs  Plan for [facility]    GI bleed  Assessment & Plan  Patient has dried blood in and around mouth and on bed sheets. Patient says that she had an episode of hematemesis last night and history of blood in the stool; however, it is unclear the timeline of hematochezia, as patient is a poor historian, telling providers different timelines.  GI consulted, plans to hold off on endoscopy unless drop in Hb or additional episodes of hematemesis and hematochezia.  Plan for hemoccult test.    Severe protein-calorie malnutrition (HCC)  Assessment & Plan  Concern for chronic eating disorder in this patient.  Plan to give 500 cc bolus of D5LR, as patient is not eating on this admission and appears dry on physical exam.  This plan was approved by nephrology, who had earlier plan of holding fluids.  BMI Findings:  Adult BMI Classifications: Underweight < 18.5        Body mass index is 18.16 kg/m².       Hepatic steatosis  Assessment & Plan  Patient was complaining of abdominal pain so CT abdomen/pelvis was obtained in the ED with findings of  "marked hepatomegaly with severe fatty infiltration, portal hypertension  Likely secondary to patient's heavy alcohol use  Continue to monitor for worsening abdominal pain, LFTs, T. Bili    Hypomagnesemia  Assessment & Plan  Magnesium was 1.6 in the ED  Patient received 2 g IV magnesium sulfate in the ED, an additional 2 g ordered in the unit  As of 8 AM, magnesium level corrected      Hypokalemia  Assessment & Plan  Potassium 2.4 on admission  Patient received 20 mEq IV potassium chloride in the ED  And additional 20 mEq IV potassium chloride ordered in the unit as well as 40 mill equivalents p.o. potassium chloride  Consult placed to nephrology  Ordered additional potassium supplementation  Continue to monitor labs      Hyponatremia  Assessment & Plan  Sodium was 124 on presentation to the ED  Likely secondary to heavy alcohol use  Patient received 500 cc IV fluid bolus in the ED.  Continue to hold IV fluids  Consult placed to nephrology  Plan to target sodium correction to 132 by 1 PM today and 136 by 1 PM tomorrow.  Continue to monitor labs.    Alcohol use disorder  Assessment & Plan  Patient admits to drinking approximately 10-12 \"tall cans\" and a fifth of [employer]il[employer] vodka since New Year's Nany  She states she drinks approximately 12 beers a day as well as 5 shots of vodka  She presented to the ED yesterday with complaints of abdominal pain and initially declined inpatient detox however later on during her encounter, patient was agreeable  ETOH in the ED was 80  Patient expressed interest in rehab program upon discharge  Patient also expressed interest in naltrexone; however, team has concern for cirrhosis. Will hold plans at this time.    * Alcohol withdrawal syndrome (HCC)  Assessment & Plan  Patient received 5 mg of Valium in the ED for withdrawal symptoms  SEWS protocol initiated for medically assisted alcohol withdrawal  Initial dose of phenobarbital in unit was 130 mg  This morning at 8 AM, SEWS was 9, last " dose of phenobarbital given was 130 mg at 8:30 AM.      VTE Pharmacologic Prophylaxis:   Pharmacologic: Enoxaparin (Lovenox) held this AM.  Mechanical VTE Prophylaxis in Place: no    Code Status: Level 1 - Full Code    Patient Centered Rounds: I have performed bedside rounds with nursing staff today.    Discussions with Specialists or Other Care Team Provider: Assessment and plan reviewed with attending physician, Dr. Victoria. Team is in contact with GI and nephrology physicians who were consulted. Nephrology has ordered additional potassium supplementation and plans to target sodium correction to 132 by 1 PM today and 136 by 1 PM tomorrow. GI would like to hold plans for endoscopy currently unless there is additional hematemesis or hematochezia or drop in Hb. Patient started on PPI. IR was also consulted due to difficulties with patient's IV access and a PICC line was placed.    Education and Discussions with Family / Patient: Patient was educated on detrimental effects of alcohol and nicotine and significance of blood in stool/hematemesis. She was encouraged to let staff know if there is additional bleeding.    Time Spent for Care: 30 minutes.  More than 50% of total time spent on counseling and coordination of care as described above.    Current Length of Stay: 1 day(s)    Current Patient Status: Inpatient     Certification Statement: The patient will continue to require additional inpatient hospital stay due to continued electrolyte abnormalities, symptoms of alcohol withdrawal with elevated SEWS score.  Discharge Plan: Patient is interested in rehabilitation program, says that she had a positive experience in the past. She is also interested in naltrexone; however, unsure if this can be started due to concern for cirrhosis.    Total time spent today 30 minutes. Greater than 50% of total time was spent with the patient and / or family counseling and / or coordination of care. A description of the counseling /  "coordination of care: Patient was counseled on benefits of alcohol and tobacco cessation. Patient was also counseled on letting nursing staff know about bowel movements/emesis so that team can examine for signs of blood.    Subjective:   As of 9:30 AM this morning, Leslye reports feeling \"pretty anxious,\" which she says is about the same as how she was feeling earlier this morning. She still appears sweaty and is fidgety/restless. Her tremor has not resolved either. She is less oriented compared to when she was seen at 8 AM by nursing staff. She states that the date is \"240,\" states the year is \"1654,\" and is unable to say what kind of building we are in. She does not respond when asked if she is experiencing SI/HI or auditory, visual, or tactile hallucinations. She vomited blood last night and has had blood in her stools recently (timeline unknown). She endorses abdominal pain in all 4 quadrants and says that she was taking Motrin everyday before admission (timeline unknown). While she has a history of withdrawal seizures, she denies experiencing one on this admission.    Objective:     Clinical Opiate Withdrawal Scale  Pulse: (!) 107    SEWS Total Score: 11 (1/5/2024 11:30 AM)        Last 24 Hours Medication List:   Current Facility-Administered Medications   Medication Dose Route Frequency Provider Last Rate    aluminum-magnesium hydroxide-simethicone  30 mL Oral Q6H PRN Jannette Fletcher PA-C      folic acid  1 mg Oral Daily Jannette Fletcher PA-C      hydrOXYzine HCL  50 mg Oral Q6H PRN Herminia Urbano PA-C      multivitamin-minerals  1 tablet Oral Daily Jannette Fletcher PA-C      nicotine  1 patch Transdermal Daily Jannette Fletcher PA-C      ondansetron  4 mg Intravenous Q6H PRN Jannette Fletcher PA-C      pantoprazole  40 mg Intravenous Q12H VARGAS Levi PA-C      potassium chloride  40 mEq Oral Daily Jannette Fletcher PA-C      potassium chloride  40 mEq Oral " Once Deborah Briggs MD      thiamine  500 mg Intravenous Q8H [facility] Jannette Marie Flecther PA-C 500 mg (24 0804)         Vitals:   Temp (24hrs), Av.6 °F (36.4 °C), Min:97.5 °F (36.4 °C), Max:97.9 °F (36.6 °C)    Temp:  [97.5 °F (36.4 °C)-97.9 °F (36.6 °C)] 97.9 °F (36.6 °C)  HR:  [] 107  Resp:  [16-18] 18  BP: ()/(60-89) 124/71  SpO2:  [97 %-100 %] 100 %  Body mass index is 18.16 kg/m².     Input and Output Summary (last 24 hours):No intake or output data in the 24 hours ending 24 1208    Physical Exam:   Physical Exam  Vitals and nursing note reviewed.   Constitutional:       General: She is in acute distress.      Appearance: She is ill-appearing and diaphoretic.   HENT:      Head: Normocephalic and atraumatic.      Mouth/Throat:      Mouth: Mucous membranes are dry.      Comments: Dried blood noted on patients lips, on teeth, and inside mouth. No tongue bite present.  Eyes:      Conjunctiva/sclera: Conjunctivae normal.      Pupils: Pupils are equal, round, and reactive to light.      Comments: Patient not cooperative when assessing for nystagmus. Cannot determine EOM or if nystagmus is present.   Cardiovascular:      Rate and Rhythm: Tachycardia present.      Pulses: Normal pulses.      Heart sounds: Normal heart sounds.   Pulmonary:      Effort: Pulmonary effort is normal.      Breath sounds: Normal breath sounds.   Abdominal:      General: Bowel sounds are normal.      Palpations: Abdomen is soft.      Tenderness: There is abdominal tenderness.      Comments: Tenderness noted in all 4 quadrants.   Skin:     General: Skin is warm.      Coloration: Skin is pale.   Neurological:      Mental Status: She is disoriented.      Comments: Patient assessed for signs of ataxia; however, she was uncooperative with examination. Romberg test attempted, but patient refused to stand. Finger-to-nose test attempted, but patient not cooperating. She is not oriented to place or time.    Patient is very  "tremulous but was not exhibiting signs of asterixis.    Psychiatric:      Comments: Patient describes her mood as \"anxious.\"          Additional Data:     Labs: keep all most recent labs as listed on admission templates   Results from last 7 days   Lab Units 01/05/24  0800 01/04/24  1253   WBC Thousand/uL 8.18 7.69   HEMOGLOBIN g/dL 11.1* 10.5*   HEMATOCRIT % 31.2* 28.8*   PLATELETS Thousands/uL 121* 117*   NEUTROS PCT %  --  73   LYMPHS PCT %  --  16   MONOS PCT %  --  11   EOS PCT %  --  0      Results from last 7 days   Lab Units 01/05/24  0800   SODIUM mmol/L 131*  130*   POTASSIUM mmol/L 2.6*  3.1*   CHLORIDE mmol/L 96  96   CO2 mmol/L 20*  20*   BUN mg/dL 3*  4*   CREATININE mg/dL 0.47*  0.45*   ANION GAP mmol/L 15  14   CALCIUM mg/dL 8.0*  8.2*   ALBUMIN g/dL 3.6   TOTAL BILIRUBIN mg/dL 4.80*   ALK PHOS U/L 266*   ALT U/L 34   AST U/L 114*   GLUCOSE RANDOM mg/dL 131  133     Results from last 7 days   Lab Units 01/05/24  1104   INR  1.14                     * I Have Reviewed All Lab Data Listed Above.  * Additional Pertinent Lab Tests Reviewed: All Labs For Current Hospital Admission Reviewed      Imaging Studies: I have personally reviewed pertinent reports.        Recent Cultures (last 7 days): N/A      Today, Patient Was Seen By: Hay Hooks, MS3    "

## 2024-01-05 NOTE — DISCHARGE SUMMARY
[facility]  Discharge- Leslye Holland 1975, 48 y.o. female MRN: [id number]  Unit/Bed#: 5T DETOX 510-01 Encounter: [id number]  Primary Care Provider: No primary care provider on file.   Date and time admitted to hospital: 1/4/2024 11:44 AM    MEDICAL DETOX UNIT, LEVEL 4  Department of Medical Toxicology  Reason for Admission/Principal Problem: Alcohol withdrawal with complication, hyponatremia, hypokalemia    Admitting provider: MD Jenifer March*   1/4/2024 11:44 AM     Discharging Physician / Practitioner: Jenifer Victoria MD  PCP: No primary care provider on file.  Admission Date:   Admission Orders (From admission, onward)       Ordered        01/04/24 1534  INPATIENT ADMISSION  Once                          Discharge Date: 01/05/24    Medical Problems        * Alcohol withdrawal with complication with inpatient treatment (HCC)  Assessment & Plan  Continue SEWS protocol with symptom-triggered, as needed phenobarbital  Patient has received a total of 845 mg phenobarbital   Can continue symptom-triggered, as needed phenobarbital (130 mg IV mild-mod and 260 mg IV mod-severe withdrawal) and titrate to RASS -1 to -2 as patient likely will not be able to participate in CIWA  Maximum dosing of phenobarbital is around 2 grams  Patient's underlying liver dysfunction may decrease clearance of phenobarbital, prolonging its half-life and effects    Alcohol use disorder, severe, dependence (HCC)  Assessment & Plan  Continue daily vitamin supplementation including high dose thiamine 500 mg IV every 8 hours x 9 doses  Unclear if patient desires naltrexone or would be a good candidate given underlying liver disease  Case management consulted for disposition planning  Patient to be transferred to [facility] for higher level of care and endoscopy; case management consultation there would be beneficial prior to discharge    Hyponatremia  Assessment &  Plan  Recent Labs     01/04/24  1253 01/04/24  1434 01/05/24  0107 01/05/24  0800 01/05/24  1400   SODIUM 124* 122* 130* 131*  130* 132*     Patient received small amount of fluid resuscitation in ED  Nephrology consulted; appreciate recommendations  Close BMP monitoring  Target 132 by this afternoon and 136 by tomorrow afternoon  Nephrology following  Received 500 cc bolus D5LR this afternoon due to concern for ongoing dehydration and mild AKA  Fluid restriction 1.8L  Strict I&Os  Suspect secondary to chronic alcohol use/beer potomania but likely complicated by underlying liver disease  Continue to monitor    Hypokalemia  Assessment & Plan  Recent Labs     01/04/24  1253 01/04/24  1434 01/05/24  0107 01/05/24  0800 01/05/24  1400   K 2.4* 3.0* 2.1* 2.6*  3.1* 3.1*     Receiving IV replacement with frequent monitoring  Continue to closely monitor    Hypomagnesemia  Assessment & Plan  Recent Labs     01/04/24  1434 01/05/24  0800   MG 1.6* 2.1     Improved with IV replacement  Continue to monitor      GI bleed  Assessment & Plan  Recent Labs     01/04/24  1253 01/05/24  0800 01/05/24  1400   HGB 10.5* 11.1* 9.3*     Patient has dried blood in and around mouth and on bed sheets. Patient says that she had an episode of hematemesis last night and history of blood in the stool; however, it is unclear the timeline of hematochezia, as patient is a poor historian, telling providers different timelines.  Concern for Lori-Jt tear vs severe esophagitis vs esophageal varices  GI consulted, as of 9:30 AM today, plan is to hold off on endoscopy unless drop in Hb or additional episodes of hematemesis and hematochezia.  Hemoccult test performed at 12:42 PM today was trace positive.  This afternoon, bedside nurse noted patient has had a few episodes of fresh blood in her mouth  Hb at 2 PM 9.3 down from 11.1 this AM. Team spoke with GI and recommendation is to transfer the patient due to concern for EGD  Plan for transfer to  "[facility] for critical care and GI  Holding Lovenox and NSAIDS  PPI BID    Hepatic steatosis  Assessment & Plan  Recent Labs     01/04/24  1253 01/05/24  0800   * 114*   ALT 38 34     Recent Labs     01/05/24  1104   INR 1.14     CT abdomen/pelvis was obtained in the ED with findings of marked hepatomegaly with severe fatty infiltration and portal hypertension  RUQ US: \"Hepatomegaly and severe hepatic steatosis. Coexisting fibrotic or inflammatory changes not excluded\"  Likely secondary to patient's chronic alcohol use  Concern for underlying undiagnosed cirrhosis, as well, given findings of poral hypertension  GI consulted; appreciate recommendations  No indication for steroids at this time  Suspect possible element of alcoholic hepatitis  Will continue to monitor liver function closely    Alcoholic ketoacidosis  Assessment & Plan  Patient with mildly worsening increased anion gap and worsening CO2  Beta-hydroxybutyrate 2.8 on admission  Given D5LR 500 cc bolus this afternoon  On high dose thiamine  Continue to encourage PO intake    Hepatic encephalopathy (HCC)  Assessment & Plan  Recent Labs     01/05/24  1104   AMMONIA 178*     Patient increasingly confused this morning and unable to cooperate with exams  CT head normal  UA with moderate bacteria but no WBCs; urine culture sent  Will discuss lactulose with GI given concern for hepatic encephalopathy  Continue to monitor    Mild protein-calorie malnutrition (HCC)  Assessment & Plan  Malnutrition Findings:   Adult Malnutrition type: Chronic illness  Adult Degree of Malnutrition: Malnutrition of mild degree                     360 Statement: Mild malnutrition r/t excessive alcohol intake as evidenced by BMI 18.1, unable to eat sufficient energy/protein to maintain a helathy weight, excessive alcohol intake reduces appetite mild fat loss noted in temple areas. Treated with Ensure Plus high protein tid    BMI Findings:  Adult BMI Classifications: Underweight < " 18.5        Body mass index is 18.16 kg/m².         Consultations During Hospital Stay:  Nephrology  Gastroenterology  IR  Case management    Procedures Performed:   Midline placement    Significant Findings / Test Results:   Anemia  Hyponatremia  Hypokalemia  Hypomagnesemia  Hyperammonemia  CT A/P: marked hepatomegaly with severe fatty infiltration and portal hypertension  RUQ US: hepatomegaly and severe hepatic steatosis; co-existing fibrotic or inflammatory changes not excluded  CT head: no acute intracranial abnormality     Incidental Findings:   N/A     Test Results Pending at Discharge (will require follow up):   Urine culture     Outpatient Tests Requested:  N/A    Complications:  Anemia with concern for GI bleeding    Reason for Admission: Alcohol withdrawal with complication, multiple electrolyte derangements    Hospital Course:     Leslye Holland is a 48 y.o. female patient who originally presented to the hospital on 1/4/2024 due to alcohol withdrawal. Patient has provided varying histories regarding recent alcohol use but notes significant daily alcohol ingestion. Patient found to have significant electrolyte derangements (hypoK, hypoNa, hypoMg) and was given fluid resuscitation. Patient admitted to the withdrawal management unit for alcohol withdrawal care. Patient initiated on SEWS protocol; overnight and today, she has received a total of 845 mg phenobarbital. Patient had an unwitnessed episode of hematemesis overnight and has had instances of being found with fresh blood around her mouth throughout today. Bedside rectal examination notable for hemoccult positive melanotic stool. Hemoglobin acutely dropped from this morning to 9.3. Patient has also been demonstrating worsening confusion with concern for hepatic encephalopathy with elevated ammonia. Gastroenterology was consulted due to concern for active GI bleeding; patient also suspected to have underlying cirrhosis with imaging also noting  significant hepatic steatosis and portal hypertension. Given concern for ongoing GI bleeding, patient recommended to be transferred to higher level of care for EGD and close monitoring. Discussed with critical care at [facility], and patient to be transferred to level 1 step down for further care. Patient's electrolytes were replaced and closely monitored, and nephrology is following for hyponatremia. Please see RAISA for further details.    The patient, initially admitted to the hospital as inpatient, was discharged earlier than expected given the following: patient to be transferred due to concern for acute GI bleeding.    Please see above list of diagnoses and related plan for additional information.     Condition at Discharge: serious     Discharge Day Visit / Exam:     Subjective:  patient confused and disoriented this morning. Unable to provide history or review of systems    Vitals: Blood Pressure: 105/78 (01/05/24 1609)  Pulse: 94 (01/05/24 1609)  Temperature: 97.9 °F (36.6 °C) (01/05/24 1609)  Temp Source: Temporal (01/05/24 1609)  Respirations: 18 (01/05/24 1609)  Height: 5' (152.4 cm) (01/04/24 1640)  Weight - Scale: 42.2 kg (93 lb) (01/04/24 1640)  SpO2: 100 % (01/05/24 1609)  Exam:   Physical Exam  Vitals and nursing note reviewed. Exam conducted with a chaperone present.   Constitutional:       Appearance: She is ill-appearing.      Comments: Mouth covered in dried blood   HENT:      Head: Normocephalic and atraumatic.      Nose: Nose normal.      Mouth/Throat:      Mouth: Mucous membranes are dry.   Eyes:      General: No scleral icterus.        Right eye: No discharge.         Left eye: No discharge.      Conjunctiva/sclera: Conjunctivae normal.      Pupils: Pupils are equal, round, and reactive to light.   Cardiovascular:      Rate and Rhythm: Regular rhythm. Tachycardia present.   Pulmonary:      Effort: Pulmonary effort is normal. No respiratory distress.      Breath sounds: No wheezing,  rhonchi or rales.   Abdominal:      General: There is distension.      Palpations: Abdomen is soft.      Tenderness: There is no abdominal tenderness. There is no guarding or rebound.   Genitourinary:     Rectum: Guaiac result positive.   Musculoskeletal:         General: No swelling or deformity.      Cervical back: Neck supple.   Skin:     General: Skin is warm and dry.      Findings: Rash (elbows and around eyes) present.   Neurological:      Comments: Disoriented, no asterixis. Does not follow commands. Intention tremor present, no tongue fasciculations   Psychiatric:      Comments: restless         Discussion with Family: None    Discharge instructions/Information to patient and family:   See after visit summary for information provided to patient and family.      Provisions for Follow-Up Care:  See after visit summary for information related to follow-up care and any pertinent home health orders.      Disposition:     Acute Care Hospital Transfer to [facility]    For Discharges to [facility]:   Not Applicable to this Patient - Not Applicable to this Patient    Planned Readmission: None     Discharge Statement:  I spent 84 minutes discharging the patient. This time was spent on the day of discharge. I had direct contact with the patient on the day of discharge. Greater than 50% of the total time was spent examining patient, answering all patient questions, arranging and discussing plan of care with patient as well as directly providing post-discharge instructions.  Additional time then spent on discharge activities.    Discharge Medications:  See after visit summary for reconciled discharge medications provided to patient and family.      ** Please Note: This note has been constructed using a voice recognition system **

## 2024-01-05 NOTE — TREATMENT PLAN
Labs from 8 AM on 1/5/2024 reviewed serum sodium at 131 mEq stable still correcting adequately.  Recommend continue to hold normal saline for now  Most recent serum potassium 2.6 mEq will supplement with K-Dur 40 mEq p.o. 3 times daily.  Hold off on further magnesium supplementation at this time  Continue every 8 BMP as discussed and ordered earlier.  Aiming for serum sodium around 132 mEq x 1 p.m. today and then 136 mEq x 1 p.m. on 1/6/2023.  Discussed with team.    Deborah Briggs MD, FASN, 1/5/2024, 9:31 AM

## 2024-01-05 NOTE — ASSESSMENT & PLAN NOTE
Concern for cirrhosis  Elevated protime of 14.9  Elevated ammonia of 178  Patient says she was told at [facility] that she had cirrhosis previously but unable to verify this in chart.  Continue to monitor LFTs  Plan for RU US

## 2024-01-05 NOTE — UTILIZATION REVIEW
"Initial Clinical Review    Pt presented to [facility] for its Level IV medically managed intensive inpatient detox unit.    Admission: Date/Time/Statement:   Admission Orders (From admission, onward)       Ordered        01/04/24 1534  INPATIENT ADMISSION  Once                          Orders Placed This Encounter   Procedures    INPATIENT ADMISSION     Standing Status:   Standing     Number of Occurrences:   1     Order Specific Question:   Level of Care     Answer:   Med Surg [16]     Order Specific Question:   Estimated length of stay     Answer:   More than 2 Midnights     Order Specific Question:   Certification     Answer:   I certify that inpatient services are medically necessary for this patient for a duration of greater than two midnights. See H&P and MD Progress Notes for additional information about the patient's course of treatment.     ED Arrival Information       Expected   -    Arrival   1/4/2024 11:43    Acuity   Urgent              Means of arrival   Ambulance    Escorted by   [facility]    Service   Medical Toxicology    Admission type   Emergency              Arrival complaint   intoxication             Chief Complaint   Patient presents with    Alcohol Intoxication     Pt was brought by [facility] for evaluation of ETOH intoxication. Since the day before New Year's Nany pt drank a fifth of Amilcar's and 12 \"tall boys\" of beer. This nurse asked if pt wants detox evaluation and pt declined at this time.        Initial Presentation: 48 y.o. female who presented to medical detox. Inpatient admission for evaluation and treatment of alcohol withdrawal syndrome. Presented w/ need for detox from alcohol. Serum ETOH: 80. Reports drinking 12 tall beers and 5 shots if Vodka daily, last drink was in the morning of 01/04 . Has no prior rehab treatment for withdrawal. Reports hx of withdrawal seizures. On exam, anxiety, tremor, and nausea . ALEXIS 8. Plan: SEWS monitoring w/ " phenobarbital management, IV thiamine, po folic acid supplement, IVF, telemetry, continuous pulse ox, trend labs, replete electrolytes as needed.     Date: 01/05       Day 2: On exam, anxiety, tremors, aaox1 . SEWS 9. Plan: continue SEWS monitoring w/ phenobarbital management, IV thiamine, po folic acid supplement, telemetry, continuous pulse ox,  trend labs, replete electrolytes as needed.     Nephrology Consult: Acute hyponatremia: Baseline sodium 139, Na 124 on admission, most recent- 130. Etiology  likely due to pre-renal azotemia + poor solute intake (beer potomania ) + polydipsia. Aiming for serum sodium around 132 mEq x 1 p.m. today and around 136 mEq by tomorrow. Place on fluid restriction of 1.8L/day for now while awaiting  lab results. Strict I/O. Check BMP Q 8. Cautious correction of hypokalemia as well also increase sodium levels. Goal to raise sodium by 8 meq in the next  24 hours.    GI Consult:Limited hematemesis with low but stable hemoglobin. Suspect esophagitis/gastritis with recent alcohol and NSAID use.    Add PPI twice daily. Downgrade diet to clears for now. Consider holding lovenox. No endoscopy at this time with phenobarbital and electrolyte abnormalities. But if active bleeding and/or drop in hemoglobin would consider more emergently. Monitor stool output, CBC and for any further vomiting.     Transfer to [facility] d/t  Acute GI bleeding in setting of acute alcohol withdrawal.    ED Triage Vitals   Temperature Pulse Respirations Blood Pressure SpO2   01/04/24 1151 01/04/24 1151 01/04/24 1151 01/04/24 1151 01/04/24 1151   (!) 97 °F (36.1 °C) 91 16 132/91 100 %      Temp Source Heart Rate Source Patient Position - Orthostatic VS BP Location FiO2 (%)   01/04/24 1151 01/04/24 1151 01/04/24 1151 01/04/24 1151 --   Tympanic Monitor Lying Left arm       Pain Score       01/04/24 1436       No Pain          Wt Readings from Last 1 Encounters:   01/04/24 42.2 kg (93 lb)     Vital Signs:    Date/Time Temp Pulse Resp BP MAP (mmHg) SpO2 O2 Device Patient Position - Orthostatic VS   01/05/24 1058 -- 107 Abnormal  18 124/71 88 100 % None (Room air) Lying   01/05/24 0734 97.9 °F (36.6 °C) 102 18 111/68 82 100 % None (Room air) Lying   01/05/24 0400 97.5 °F (36.4 °C) 103 18 134/89 -- 100 % None (Room air) Lying   01/05/24 0157 97.5 °F (36.4 °C) 113 Abnormal  18 115/83 93 98 % None (Room air) Lying   01/05/24 0000 97.5 °F (36.4 °C) 111 Abnormal  18 99/63 -- 97 % None (Room air) Lying   01/04/24 2300 -- 75 18 -- -- 99 % None (Room air) --   01/04/24 2200 -- 77 18 -- -- 99 % None (Room air) --   01/04/24 2100 97.5 °F (36.4 °C) 82 18 105/65 -- 100 % None (Room air) Lying   01/04/24 2015 -- 79 18 -- -- 100 % None (Room air) --   01/04/24 1927 97.5 °F (36.4 °C) 90 18 109/66 80 100 % None (Room air) Lying   01/04/24 1640 97.5 °F (36.4 °C) 93 18 104/60 -- 99 % None (Room air) Lying   01/04/24 1436 -- 88 16 130/77 -- 99 % None (Room air) Lying     Severity of Ethanol Withdrawal Scale (SEWS): ]  Row Name 01/05/24 1130 01/05/24 0800 01/05/24 0400 01/05/24 0158 01/04/24 2100   Severity of Ethanol Withdrawal Scale (SEWS)   ANXIETY: Do you feel that something bad is about to happen to you right now? 0  -TN 3  -TN 0  -NR 3  -NR 3  -NR   NAUSEA and DRY HEAVES or VOMITING? 0  -TN 0  -TN 0  -NR 0  -NR 0  -NR   SWEATING: (includes moist palms, sweating now)? Score 0 or 2 0  -TN 0  -TN 0  -NR 0  -NR 0  -NR   TREMOR: with arms extended eyes closed? 2  -TN 2  -TN 0  -NR 2  -NR 2  -NR   AGITATION: Fidgety, restless, pacing? 3  -TN 3  -TN 0  -NR 0  -NR 0  -NR   DISORIENTATION: 3  -TN 1  -TN 0  -NR 0  -NR 0  -NR   HALLUCINATIONS: 0  -TN 0  -TN 0  -NR 0  -NR 0  -NR   VITAL SIGNS: ANY (Pulse >110, Diastolic BP >90, Temp >99.6) 3  -TN 0  -TN 0  -NR 3  -NR 0  -NR   SEWS Total Score 11  -TN 9  -TN 0  -NR 8  -NR 5  -NR     Row Name 01/04/24 1928 01/04/24 1703      Severity of Ethanol Withdrawal Scale (SEWS)   ANXIETY: Do you feel that  something bad is about to happen to you right now? 3  -NR 3  -LB      NAUSEA and DRY HEAVES or VOMITING? 0  -NR 3  -LB      SWEATING: (includes moist palms, sweating now)? Score 0 or 2 0  -NR 0  -LB      TREMOR: with arms extended eyes closed? 2  -NR 2  -LB      AGITATION: Fidgety, restless, pacing? 0  -NR 0  -LB      DISORIENTATION: 0  -NR 0  -LB      HALLUCINATIONS: 1  -NR 0  -LB      VITAL SIGNS: ANY (Pulse >110, Diastolic BP >90, Temp >99.6) 0  -NR 0  -LB      SEWS Total Score 6  -NR 8  -LB          Pertinent Labs/Diagnostic Test Results:   CT abdomen pelvis with contrast   Final Result by Shivam Arellano MD (01/04 1450)      Marked hepatomegaly with severe fatty infiltration. Portal hypertension.      Uncomplicated colonic diverticulosis.      Diffuse osteopenia, advanced for stated age.            Workstation performed: [id number]         IR other    (Results Pending)   US right upper quadrant    (Results Pending)   CT head wo contrast    (Results Pending)     01/04 EKG result: Normal sinus rhythm  Prolonged QT      Results from last 7 days   Lab Units 01/05/24  0800 01/04/24  1253   WBC Thousand/uL 8.18 7.69   HEMOGLOBIN g/dL 11.1* 10.5*   HEMATOCRIT % 31.2* 28.8*   PLATELETS Thousands/uL 121* 117*   NEUTROS ABS Thousands/µL  --  5.54         Results from last 7 days   Lab Units 01/05/24  0800 01/05/24  0107 01/04/24  1434 01/04/24  1253   SODIUM mmol/L 131*  130* 130* 122* 124*   POTASSIUM mmol/L 2.6*  3.1* 2.1* 3.0* 2.4*   CHLORIDE mmol/L 96  96 94* 86* 86*   CO2 mmol/L 20*  20* 21 19* 19*   ANION GAP mmol/L 15  14 15 17 19   BUN mg/dL 3*  4* 3* 3* 3*   CREATININE mg/dL 0.47*  0.45* 0.47* 0.44* 0.47*   EGFR ml/min/1.73sq m 118  119 118 120 118   CALCIUM mg/dL 8.0*  8.2* 8.0* 7.8* 8.4   MAGNESIUM mg/dL 2.1  --  1.6*  --      Results from last 7 days   Lab Units 01/05/24  1104 01/05/24  0800 01/04/24  1253   AST U/L  --  114* 158*   ALT U/L  --  34 38   ALK PHOS U/L  --  266* 253*   TOTAL PROTEIN g/dL   --  7.2 7.0   ALBUMIN g/dL  --  3.6 3.6   TOTAL BILIRUBIN mg/dL  --  4.80* 3.67*   AMMONIA umol/L 178*  --   --          Results from last 7 days   Lab Units 01/05/24  0800 01/05/24  0107 01/04/24  1434 01/04/24  1253   GLUCOSE RANDOM mg/dL 131  133 97 99 103             BETA-HYDROXYBUTYRATE   Date Value Ref Range Status   01/04/2024 2.8 (H) <0.6 mmol/L Final              Results from last 7 days   Lab Units 01/05/24  1104   PROTIME seconds 14.9*   INR  1.14             Results from last 7 days   Lab Units 01/04/24  1253   LIPASE u/L <6*         Results from last 7 days   Lab Units 01/04/24  1253   ETHANOL LVL mg/dL 80*           ED Treatment:   Medication Administration from 01/04/2024 1142 to 01/04/2024 1637         Date/Time Order Dose Route Action     01/04/2024 1409 EST iohexol (OMNIPAQUE) 350 MG/ML injection (MULTI-DOSE) 75 mL 75 mL Intravenous Given     01/04/2024 1442 EST potassium chloride 20 mEq IVPB (premix) 20 mEq Intravenous New Bag     01/04/2024 1435 EST diazepam (VALIUM) injection 5 mg 5 mg Intravenous Given          History reviewed. No pertinent past medical history.  Present on Admission:   Cirrhosis, alcoholic (HCC)      Admitting Diagnosis: Alcohol withdrawal (HCC) [F10.939]  Hypokalemia [E87.6]  Hypochloremia [E87.8]  Hypomagnesemia [E83.42]  Hyperbilirubinemia [E80.6]  Alcohol intoxication (HCC) [F10.929]  Alcohol abuse [F10.10]  Hyponatremia [E87.1]  Anemia [D64.9]  Age/Sex: 48 y.o. female  Admission Orders:  Clear liquid  Diet. SCDs.  Fall & Seizure Precautions.  SEWS monitoring.  Telemetry & Continuous Pulse Ox.    Scheduled Medications:  dextrose 5% lactated Ringer's, 500 mL, Intravenous, Once  folic acid, 1 mg, Oral, Daily  multivitamin-minerals, 1 tablet, Oral, Daily  nicotine, 1 patch, Transdermal, Daily  pantoprazole, 40 mg, Intravenous, Q12H VARGAS  potassium chloride, 40 mEq, Oral, Daily  potassium chloride, 40 mEq, Oral, Once  thiamine, 500 mg, Intravenous, Q8H VARGAS      Continuous IV  Infusions:  sodium chloride 0.9 % infusion  Rate: 75 mL/hr Dose: 75 mL/hr  Freq: Continuous Route: IV  Indications of Use: IV Hydration,IV Resuscitation  Last Dose: Stopped (01/05/24 0631)  Start: 01/04/24 1730 End: 01/05/24 0802      PRN Meds:  aluminum-magnesium hydroxide-simethicone, 30 mL, Oral, Q6H PRN  hydrOXYzine HCL, 50 mg, Oral, Q6H PRN  ondansetron, 4 mg, Intravenous, Q6H PRN      diazepam, 5 mg, Intravenous; 01/04 x 1  phenobarbital, 130 mg, Intravenous; 01/04 x 1, 01/05 x 3  phenobarbital, 65 mg, Intravenous; 01/04 x 1        IP CONSULT TO CASE MANAGEMENT  IP CONSULT TO NEPHROLOGY  IP CONSULT TO NUTRITION SERVICES  IP CONSULT TO GASTROENTEROLOGY  INPATIENT CONSULT TO IR    Network Utilization Review Department  ATTENTION: Please call with any questions or concerns to [phone] and carefully listen to the prompts so that you are directed to the right person. All voicemails are confidential.   For Discharge needs, contact Care Management DC Support Team at [phone] opt. 2  Send all requests for admission clinical reviews, approved or denied determinations and any other requests to dedicated fax number below belonging to the campus where the patient is receiving treatment. List of dedicated fax numbers for the Facilities:  FACILITY NAME UR FAX NUMBER   ADMISSION DENIALS (Administrative/Medical Necessity) [phone]   DISCHARGE SUPPORT TEAM (NETWORK) [phone]   PARENT CHILD HEALTH (Maternity/NICU/Pediatrics) [phone]   [facility] [phone]   [facility] [phone]   [facility] [phone]   [facility] [phone]   [facility] [phone]   General acute hospital [phone]   [facility] [phone]   [facility] [phone]   [facility]  [phone]   [facility] [phone]   [facility] [phone]

## 2024-01-05 NOTE — PROGRESS NOTES
01/05/24 1415   Housing Stability   In the last 12 months, was there a time when you were not able to pay the mortgage or rent on time? N   In the last 12 months, was there a time when you did not have a steady place to sleep or slept in a shelter (including now)? N   Transportation Needs   In the past 12 months, has lack of transportation kept you from medical appointments or from getting medications? no   In the past 12 months, has lack of transportation kept you from meetings, work, or from getting things needed for daily living? No   Food Insecurity   Within the past 12 months, you worried that your food would run out before you got the money to buy more. Never true   Within the past 12 months, the food you bought just didn't last and you didn't have money to get more. Never true   Intimate Partner Violence   Within the last year, have you been afraid of your partner or ex-partner? No   Within the last year, have you been humiliated or emotionally abused in other ways by your partner or ex-partner? No   Within the last year, have you been kicked, hit, slapped, or otherwise physically hurt by your partner or ex-partner? No   Within the last year, have you been raped or forced to have any kind of sexual activity by your partner or ex-partner? No   Alcohol Use   Q1: How often do you have a drink containing alcohol? 4 or more ti   Q2: How many drinks containing alcohol do you have on a typical day when you are drinking? 3 or 4   Utilities   In the past 12 months has the electric, gas, oil, or water company threatened to shut off services in your home? No

## 2024-01-05 NOTE — PROCEDURES
Midline Insertion (Bedside)    Date/Time: 1/5/2024 11:22 AM    Performed by: Sarkis Cox RN  Authorized by: Jenifer Victoria MD    Patient location:  Bedside  Consent:     Consent obtained:  Verbal    Consent given by:  Patient  Universal protocol:     Procedure explained and questions answered to patient or proxy's satisfaction: yes      Relevant documents present and verified: yes      Site/side marked: yes      Immediately prior to procedure, a time out was called: yes      Patient identity confirmed:  Verbally with patient, arm band and hospital-assigned identification number  Pre-procedure details:     Hand hygiene: Hand hygiene performed prior to insertion      Sterile barrier technique: All elements of maximal sterile technique followed      Skin preparation:  ChloraPrep    Skin preparation agent: Skin preparation agent completely dried prior to procedure    Indications:     Midline indications: new site    Anesthesia (see MAR for exact dosages):     Anesthesia method:  Local infiltration    Local anesthetic:  Lidocaine 1% w/o epi  Procedure details:     Location:  Left basilic    Site selection rationale:  Non dominant extremity    Catheter size:  4 Fr    Landmarks identified: yes      Ultrasound guidance: yes      Ultrasound image availability:  Not obtained due to urgency    Sterile ultrasound techniques: Sterile gel and sterile probe covers were used      Number of attempts:  1    Successful placement: yes      Catheter length (cm): 12.    Exposed catheter length (cm):  0  Post-procedure details:     Post-procedure:  Dressing applied and securement device placed    Assessment:  Blood return through all ports and free fluid flow    Post-procedure complications: none      Patient tolerance of procedure:  Tolerated well, no immediate complications

## 2024-01-05 NOTE — ASSESSMENT & PLAN NOTE
>>ASSESSMENT AND PLAN FOR GI BLEED WRITTEN ON 1/5/2024  4:27 PM BY BOO JACOME MD    Recent Labs     01/04/24  1253 01/05/24  0800 01/05/24  1400   HGB 10.5* 11.1* 9.3*     Patient has dried blood in and around mouth and on bed sheets. Patient says that she had an episode of hematemesis last night and history of blood in the stool; however, it is unclear the timeline of hematochezia, as patient is a poor historian, telling providers different timelines.  Concern for Lori-Jt tear vs severe esophagitis vs esophageal varices  GI consulted, as of 9:30 AM today, plan is to hold off on endoscopy unless drop in Hb or additional episodes of hematemesis and hematochezia.  Hemoccult test performed at 12:42 PM today was trace positive.  This afternoon, bedside nurse noted patient has had a few episodes of fresh blood in her mouth  Hb at 2 PM 9.3 down from 11.1 this AM. Team spoke with GI and recommendation is to transfer the patient due to concern for EGD  Plan for transfer to [facility] for critical care and GI  Holding Lovenox and NSAIDS  PPI BID

## 2024-01-05 NOTE — ASSESSMENT & PLAN NOTE
Recent Labs     01/05/24  1104   AMMONIA 178*     Patient increasingly confused this morning and unable to cooperate with exams  CT head normal  UA with moderate bacteria but no WBCs; urine culture sent  Will discuss lactulose with GI given concern for hepatic encephalopathy  Continue to monitor

## 2024-01-05 NOTE — ASSESSMENT & PLAN NOTE
Recent Labs     01/04/24  1253 01/05/24  0800 01/05/24  1400   HGB 10.5* 11.1* 9.3*     Patient has dried blood in and around mouth and on bed sheets. Patient says that she had an episode of hematemesis last night and history of blood in the stool; however, it is unclear the timeline of hematochezia, as patient is a poor historian, telling providers different timelines.  Concern for Lori-Jt tear vs severe esophagitis vs esophageal varices  GI consulted, as of 9:30 AM today, plan is to hold off on endoscopy unless drop in Hb or additional episodes of hematemesis and hematochezia.  Hemoccult test performed at 12:42 PM today was trace positive.  This afternoon, bedside nurse noted patient has had a few episodes of fresh blood in her mouth  Hb at 2 PM 9.3 down from 11.1 this AM. Team spoke with GI and recommendation is to transfer the patient due to concern for EGD  Plan for transfer to [facility] for critical care and GI  Holding Lovenox and NSAIDS  PPI BID

## 2024-01-05 NOTE — CONSULTS
"Patient MRN: [id number]  Date of Service: 1/5/2024  Referring Provider: Selin Ruiz PA-C  Provider Creating Note: Yesenia Levi PA-C  PCP: No primary care provider on file.    Reason for Consult:Hematemesis [K92.0]      HISTORY OF PRESENT ILLNESS:  Leslye Holland is a 48 y.o. female with PMH including AUD, EtOH w/d with alcoholic ketoacidosis, chronic pancreatitis, h/o childhood proctitis/ulcerative colitis, h/o UGI bleeds, HTN. Status post hysterectomy, nelda.  Last EGD at [facility] 1/2023 showed erosive gastritis with esophagitis and possible Lori-Tavera tear. Also evidence of portal hypertensive gastropathy without bleeding. She was treated with twice daily PPI. She did not follow up after discharge.   She presented to [facility] yesterday requesting Xanax and Ativan for withdrawal symptoms. She states she had been drinking alcohol heavily since GISELLA. Alcohol level in ED 80. Also admits to NSAID use, taking Motrin for pain. She was unable to provide more details. Has had intermittent diarrhea reported per EMR. Was found to have significant electrolyte abnormalities and has been evaluated by Nephrology. GI evaluation requested this morning after patient was found with dried blood in her mouth and reported that she vomited blood. Hemoglobin has improved slightly since admission 10.5>>11.1. CT AP shows severe hepatic steatosis and hepatomegaly with portal hypertension. Liver enzymes with elevated AST, normal ALT, elevated Tbili 3.67. Lipase WNL. Thrombocytopenia 117, albumin normal 3.6.   [facility] labs reviewed - hemoglobin has ranged from 6.6 to 12g/dL in 2023.   Met with patient after IR PICC placed. She is a poor historian, answering \"yes\" to almost all questions but unable/unwilling to provide details. She states she had been vomiting blood at home for past few days. She also states she has blood in her stool, described as \"blue\". She endorses epigastric abdominal pain. She denies heartburn or dysphagia. States " "she was diagnosed with cirrhosis previously. Says she has a family history of cirrhosis, when asked which family member she replied repeatedly \"Yesterday.\" Per RN, patient found to have red blood around her lips this morning. RN states pt reported episode of hematemesis overnight but did not notify staff.  No trauma noted to the oral mucosa. No further episodes. Vitals stable. Lovenox held.     Additional endoscopic history  1/2022 EGD LA grade D esophagitis with erosive/gastritis/duodenitis (neg HP biopsies)  12/2022 EGD resolution of prior esophagitis. Susp for SSS Bill's esophagus (Bx negative for intestinal metaplasia/dysplasia), mild-moderate gastritis likely EtOH-related  1/2022 colonoscopy normal other than diverticulosis  12/2022 sigmoidoscopy almost to hepatic flexure without evidence of bleeding or signs of colitis. She was treated with 4.8g mesalamine daily with plans to discontinue as pathology negative for evidence of colitis. Fecal calprotectin 741  2014 colonoscopy biopsies + active proctitis    Review of Systems:    General:   No fever or chills; No significant weight loss or gain.   EENT:   No ear pain, facial swelling; No sneezing, sore throat.   Skin:   No rashes, color changes.   Respiratory:     No shortness of breath, cough, wheezing, stridor.   Cardiovascular:     No chest pain, palpitations.   Gastrointestinal:    As per HPI.   Musculoskeletal:     No arthralgias, myalgias, swelling.   Neurologic:   No dizziness, numbness, weakness. No speech difficulties.   Psych:   No agitation, suicidal ideations    Otherwise, All other twelve-point review of systems normal.     History reviewed. No pertinent past medical history.  Past Surgical History:   Procedure Laterality Date    KNEE SURGERY       Allergies   Allergen Reactions    Acetaminophen Other (See Comments)     Other reaction(s): LIVER ISSUES    Aspirin GI Intolerance    Codeine Hives    Cyclobenzaprine Hives    Ketorolac Tromethamine " Hives    Lamotrigine Other (See Comments)     lower extremity pain    Methocarbamol Hives    Nsaids Other (See Comments)     GI upset:Toradol=allergy    Tramadol Hives     Other reaction(s): rash       Medications:  Home Medications  Prior to Admission medications    Not on File       Inhouse Medications    Current Facility-Administered Medications:     aluminum-magnesium hydroxide-simethicone (MAALOX) oral suspension 30 mL, 30 mL, Oral, Q6H PRN    enoxaparin (LOVENOX) subcutaneous injection 40 mg, 40 mg, Subcutaneous, Daily    folic acid (FOLVITE) tablet 1 mg, 1 mg, Oral, Daily, 1 mg at 01/05/24 0830    hydrOXYzine HCL (ATARAX) tablet 50 mg, 50 mg, Oral, Q6H PRN, 50 mg at 01/04/24 2123    multivitamin-minerals (CENTRUM) tablet 1 tablet, 1 tablet, Oral, Daily, 1 tablet at 01/05/24 0830    nicotine (NICODERM CQ) 7 mg/24hr TD 24 hr patch 1 patch, 1 patch, Transdermal, Daily, 1 patch at 01/05/24 0830    ondansetron (ZOFRAN) injection 4 mg, 4 mg, Intravenous, Q6H PRN    potassium chloride (K-DUR,KLOR-CON) CR tablet 40 mEq, 40 mEq, Oral, Daily, 40 mEq at 01/05/24 0830    thiamine (VITAMIN B1) 500 mg in sodium chloride 0.9 % 50 mL IVPB, 500 mg, Intravenous, Q8H VARGAS, 500 mg at 01/05/24 0804      Social History   reports that she has been smoking cigarettes. She started smoking about 6 years ago. She has a 1.5 pack-year smoking history. She has never used smokeless tobacco. She reports current alcohol use. She reports that she does not use drugs.    Family History  History reviewed. No pertinent family history.      OBJECTIVE:    /68 (BP Location: Left arm)   Pulse 102   Temp 97.9 °F (36.6 °C) (Temporal)   Resp 18   Ht 5' (1.524 m)   Wt 42.2 kg (93 lb)   SpO2 100%   BMI 18.16 kg/m²   Physical Exam:     General Appearance:    Awake, alert, oriented x3, no distress, thin/cachectic, unable to lay still, ill-appearing. Appears older than stated age.     Head:    Normocephalic without obvious abnormality   Eyes:      Would not open eyes for exam.       Neck:   Supple, no adenopathy   Throat:   Mucous membranes moist   Lungs:     Clear to auscultation bilaterally, no wheezing or rhonchi   Heart:    Regular rate and rhythm, S1 and S2 normal, no murmur   Abdomen:     Soft, non-tender, non-distended. bowel sounds active. No    masses, rebound or guarding.    Extremities:   Extremities normal. No clubbing, cyanosis or edema   Psych  Derm:   Normal affect    No jaundice. +periumbilical tattoo. No palmar erythema, spider angiomata   Neurologic:   CNII-XII grossly intact. Speech intact. Unable to test for asterixis.         Laboratory Studies:  Results from last 7 days   Lab Units 01/05/24  0800 01/04/24  1253   WBC Thousand/uL 8.18 7.69   HEMOGLOBIN g/dL 11.1* 10.5*   HEMATOCRIT % 31.2* 28.8*   MCV fL 90 88   PLATELETS Thousands/uL 121* 117*     Results from last 7 days   Lab Units 01/05/24  0107 01/04/24  1434 01/04/24  1253   SODIUM mmol/L 130* 122* 124*   POTASSIUM mmol/L 2.1* 3.0* 2.4*   CHLORIDE mmol/L 94* 86* 86*   CO2 mmol/L 21 19* 19*   BUN mg/dL 3* 3* 3*   CREATININE mg/dL 0.47* 0.44* 0.47*   CALCIUM mg/dL 8.0* 7.8* 8.4   ALBUMIN g/dL  --   --  3.6   TOTAL BILIRUBIN mg/dL  --   --  3.67*   ALK PHOS U/L  --   --  253*   ALT U/L  --   --  38   AST U/L  --   --  158*           Imaging and Other Studies:  CT abdomen pelvis with contrast    Result Date: 1/4/2024  Narrative: CT ABDOMEN AND PELVIS WITH IV CONTRAST INDICATION:   upper abd pain. COMPARISON: CT abdomen/pelvis dated 8/7/2007. TECHNIQUE:  CT examination of the abdomen and pelvis was performed. Multiplanar 2D reformatted images were created from the source data. This examination, like all CT scans performed in the [facility], was performed utilizing techniques to minimize radiation dose exposure, including the use of iterative reconstruction and automated exposure control. Radiation dose length product (DLP) for this visit:  216 mGy-cm IV Contrast:  75 mL  of iohexol (OMNIPAQUE) Enteric Contrast:  Enteric contrast was not administered. FINDINGS: ABDOMEN LOWER CHEST:  No clinically significant abnormality identified in the visualized lower chest. LIVER/BILIARY TREE: The liver is markedly enlarged and demonstrates severe fatty infiltration with sparing adjacent to the gallbladder fossa. Recanalized umbilical vein and multiple paraesophageal and upper abdominal varices consistent with portal hypertension. GALLBLADDER: Gallbladder is surgically absent. SPLEEN:  Unremarkable. PANCREAS:  Unremarkable. ADRENAL GLANDS:  Unremarkable. KIDNEYS/URETERS:  Unremarkable. No hydronephrosis. STOMACH AND BOWEL: No evidence for bowel obstruction. Colonic diverticulosis without evidence for acute diverticulitis. APPENDIX:  No findings to suggest appendicitis. ABDOMINOPELVIC CAVITY:  No ascites.  No pneumoperitoneum.  No lymphadenopathy. Mildly prominent periportal lymph nodes are likely reactive. VESSELS:  Unremarkable for patient's age. PELVIS REPRODUCTIVE ORGANS: Surgical changes of prior hysterectomy. URINARY BLADDER:  Unremarkable. ABDOMINAL WALL/INGUINAL REGIONS: Small fat-containing umbilical hernia. OSSEOUS STRUCTURES:  No acute fracture or destructive osseous lesion. Diffuse osteopenia, advanced for stated age.     Impression: Marked hepatomegaly with severe fatty infiltration. Portal hypertension. Uncomplicated colonic diverticulosis. Diffuse osteopenia, advanced for stated age. Workstation performed: [id number]         ASSESSMENT AND PLAN:  48 year old female with AUD admitted [facility] detoxification. Currently on SEWS protocol. Last phenobarbital dose was 0830 today. Management per Medical Toxicology.  Electrolyte abnormalities, Na 124 on admission, now 130. K 2.1, Mg 1.6. Plan per primary team, Nephrology. Monitor labs, urine output.   Limited hematemesis with low but stable hemoglobin. Suspect esophagitis/gastritis with recent alcohol and NSAID use.  DDx: MWtear, PUD, variceal bleed although given presentation felt less likely variceal. Add PPI twice daily. Downgrade diet to clears for now. Consider holding lovenox. No endoscopy at this time with phenobarbital and electrolyte abnormalities. But if active bleeding and/or drop in hemoglobin would consider more emergently. Monitor stool output, CBC and for any further vomiting. Will discuss plan with GI attending.   Alcohol-related liver disease with alcohol hepatitis component suspected. AST>ALT. No PT/INR to calculate MDF or MELD, will order.  No steroids with current concern for GI bleed. She does have evidence of portal hypertension on prior endoscopy and imaging. Thrombocytopenia on labs, but albumin normal. Complete alcohol abstinence and NSAID should be avoided.   History of ulcerative colitis. Last sigmoidoscopy 12/2022 without visual or biopsy evidence of active colitis. Outpatient follow up.         Yesenia Levi PA-C

## 2024-01-05 NOTE — OCCUPATIONAL THERAPY NOTE
"    Occupational Therapy Evaluation     Patient Name: Leslye Holland  Today's Date: 1/5/2024  Problem List  Principal Problem:    Alcohol withdrawal syndrome (HCC)  Active Problems:    Alcohol use disorder    Hyponatremia    Hypokalemia    Hypomagnesemia    Hepatic steatosis    Severe protein-calorie malnutrition (HCC)    GI bleed    Cirrhosis, alcoholic (HCC)    Hepatic encephalopathy (HCC)    Past Medical History  History reviewed. No pertinent past medical history.  Past Surgical History  Past Surgical History:   Procedure Laterality Date    KNEE SURGERY               01/05/24 1135   OT Last Visit   OT Visit Date 01/05/24   Note Type   Note type Evaluation   Pain Assessment   Pain Assessment Tool 0-10   Pain Score 3   Pain Location/Orientation Location: Generalized   Restrictions/Precautions   Weight Bearing Precautions Per Order No   Other Precautions Fall Risk;Cognitive   Home Living   Type of Home House   Additional Comments Pt is a questionable historian.  Pt stated that she lives alone and has \"a few stairs.\" Pt unable to elaborate further.   Prior Function   Level of Coleman Independent with ADLs;Independent with IADLS   Lives With Alone   ADL   Grooming Assistance 4  Minimal Assistance   UB Bathing Assistance 4  Minimal Assistance   LB Bathing Assistance 3  Moderate Assistance   UB Dressing Assistance 4  Minimal Assistance   LB Dressing Assistance 3  Moderate Assistance   Toileting Assistance  3  Moderate Assistance   Bed Mobility   Supine to Sit 3  Moderate assistance   Additional items Assist x 1;Increased time required;LE management   Sit to Supine 4  Minimal assistance   Additional items Assist x 1;Increased time required;LE management   Transfers   Sit to Stand 4  Minimal assistance   Additional items Assist x 1;Verbal cues   Stand to Sit 4  Minimal assistance   Additional items Assist x 1;Verbal cues   Functional Mobility   Functional Mobility 4  Minimal assistance   Additional Comments steps " at EOB   Balance   Static Sitting Good   Dynamic Sitting Fair +   Static Standing Fair   Dynamic Standing Fair -   Ambulatory Fair -   Activity Tolerance   Activity Tolerance Patient limited by fatigue   RUE Assessment   RUE Assessment WFL   LUE Assessment   LUE Assessment WFL   Hand Function   Gross Motor Coordination Impaired   Fine Motor Coordination Functional   Psychosocial   Psychosocial (WDL) WDL   Perception   Inattention/Neglect Appears intact   Cognition   Overall Cognitive Status Impaired   Arousal/Participation Lethargic   Attention Difficulty attending to directions   Orientation Level Oriented to person;Disoriented to situation;Disoriented to time   Memory Decreased recall of recent events   Following Commands Follows one step commands with increased time or repetition   Assessment   Limitation Decreased ADL status;Decreased UE strength;Decreased Safe judgement during ADL;Decreased endurance;Decreased high-level ADLs   Prognosis Good   Assessment   Pt is a 48 y.o. female seen for OT evaluation s/p admit to [facility] on 1/4/2024 w/ Alcohol withdrawal syndrome (HCC).  See medical history above for extensive list of comorbidities affecting pt's functional performance at time of assessment. Personal factors affecting Pt at time of IE include:limited home support, behavioral pattern, difficulty performing IADLS , and health management . Upon evaluation: Pt requires Abdirizak for functional ambulation mobility Abdirizak for ADL transfers.  Pt requires mod-maxA for ADLs in standing. Pt received in bed in flexed position, guarded. Pt with noted tremors at rest and with movement, thus impacting stability and GMC/FMC. Pt's primary barrier(s) at this time: decreased coordination, safety, and sustained attention.. The following deficits impact occupational performance: weakness, decreased strength, decreased balance, decreased tolerance, and decreased safety awareness. Pt to benefit from continued skilled OT services while in  the hospital to address deficits as defined above and maximize level of functional independence w ADL's and functional mobility. Occupational performance areas to address include: bathing/shower, toilet hygiene, dressing, health maintenance, functional mobility, and clothing management. From OT standpoint, recommendation at time of d/c would be moderate resource intensity.       Goals   STG Time Frame   (2 weeks)   Short Term Goals Pt will complete ADL transfers Blossom.   Pt will complete functional ambulation Blossom.   Pt will complete LB ADLs Blossom.   Pt will complete toileting Blossom.    Plan   Treatment Interventions ADL retraining;Functional transfer training;UE strengthening/ROM;Endurance training;Continued evaluation;Energy conservation   Goal Expiration Date 01/19/24   OT Frequency 2-3x/wk   Discharge Recommendation   Rehab Resource Intensity Level, OT II (Moderate Resource Intensity)   AM-PAC Daily Activity Inpatient   Lower Body Dressing 2   Bathing 2   Toileting 2   Upper Body Dressing 3   Grooming 3   Eating 4   Daily Activity Raw Score 16   Daily Activity Standardized Score (Calc for Raw Score >=11) 35.96

## 2024-01-05 NOTE — CONSULTS
Consultation    Nephrology   Leslye Holland 48 y.o. female MRN: [id number]  Unit/Bed#: 5T DETOX 510-01 Encounter: [id number]    History of Present Illness   Physician Requesting Consult: Jenifer Terrazas*  Reason for Consult : -Acute hyponatremia    ASSESSMENT:  48 y.o.  female with medical history of use who was admitted for alcohol use disorder and alcohol withdrawal syndrome as she presented to the ED requesting Xanax and Ativan for withdrawal. Nephrology has been consulted for evaluation and management of hyponatremia.     Acute hyponatremia:  Baseline sodium 139 meq in 2014 subsequently no further labs  Admission sodium level of 124 meq at 1 PM on 1/4/2024.  Most recent sodium level was 130 meq at 1 AM on 1/5/2024  Etiology of hyponatremia multifactorial likely due to pre-renal azotemia + poor solute intake (beer potomania ) + polydipsia.  Awaiting a.m. labs being drawn currently by nursing staff to see whether patient needs to be on normal saline versus D5 water for possible overcorrection.  Aiming for serum sodium around 132 mEq x 1 p.m. today and around 136 mEq by tomorrow  Place on fluid restriction of 1.8L/day for now while awaiting results  Strict I/O.  Check BMP Q 8  Cautious correction of hypokalemia as well also increase sodium levels  Goal to raise sodium by 8 meq in the next  24 hours.   Call if sodium is greater than 132 or less than 130 in the next 24 hours.   Baseline Creatinine of 0.4-0.6 mg/dL  Most recent creatinine is 0.47 Mg/dL    H&H/anemia:  most recent hemoglobin at 10.5 g/dL  Maintain hemoglobin greater than 8 grams/deciliter    Acid-base electrolytes:  Hyponatremia: See above  hypoKalemia: Most recent serum potassium 2.1 mEq awaiting recheck from this a.m. to see how much supplementation is warranted.  Currently on potassium 40 mEq p.o. daily  Metabolic acidosis: Admitted with bicarb of 19 most recent bicarb of 21 awaiting levels  Hypomagnesemia: Initial magnesium level 1.6 was  ordered for supplementation we will recheck a.m. labs to see if needs further supplementation.  Check magnesium every 8  Concern for hypophosphatemia: Check phosphorus level every 8      Blood pressure/normotension:   Optimize hemodynamic status to avoid delay in renal recovery.  Maintain MAP > 65mmHg  Avoid BP fluctuations.  Home medications: None  Current medications: None    Other medical problems:  Alcohol withdrawal:  Management per primary team.  Follow-up with detox.  On thiamine, multivitamin, folic acid      Plan below is what was discussed with the primary team that they agree with:  check BMP, magnesium, phosphorus every 8 hours monitor for refeeding  Call if sodium greater than 132 mEq or less than 130 mEq  Aiming for serum sodium around 132 mEq x 1 p.m. today  Aiming for serum sodium around 136 mEq x 1 p.m. on 1/6/2024  Stat labs  Fluid restrict for now 1.8 L/day while awaiting lab work.  DC normal saline for now while awaiting lab work currently is not running anyways due to lack of IV access    Thanks for the consult  Will continue to follow  Please call with questions/ concerns.      Deborah Briggs MD, FASN, 1/5/2024, 8:05 AM          HISTORY OF PRESENT ILLNESS:   Leslye Holland is a 48 y.o.  female with medical history of use who was admitted for alcohol use disorder and alcohol withdrawal syndrome as she presented to the ED requesting Xanax and Ativan for withdrawal. Nephrology has been consulted for evaluation and management of hyponatremia.  Patient seen and examined in her room earlier this a.m.  On admission was found to have serum sodium 124 mEq along with hypokalemia.  Received IV fluids and IV potassium supplementation on admission has had multiple issues with IV.  Patient reports she has been drinking a lot since New Year's Nany cannot totally quantify how much.  Not much p.o. solute intake.  Reports has also been taking NSAIDs.  Also reports intermittent diarrhea however cannot quantify  how much.  Blood pressure stable remains afebrile urine output not adequately documented.  Review of record shows baseline creatinine 0.4 to 0.6 mg/dL.  Admitted with serum creatinine of 0.47 mg/dL creatinine today is 0.47 mg/dL.  Records reveal baseline serum sodium normal around 139 mEq admitted with serum sodium 124 mEq at 1 PM on 1/24 most recent serum sodium from 1 AM at 130 mEq currently has no IV access unsure for how long has not received any fluids.  History obtained from chart review and the patient    Inpatient consult to Nephrology  Consult performed by: Deborah Briggs MD  Consult ordered by: Jannette Fletcher PA-C          Review of Systems   Constitutional:  Positive for fatigue. Negative for chills.   HENT:  Negative for congestion.    Respiratory:  Negative for cough, shortness of breath and wheezing.    Cardiovascular:  Negative for leg swelling.   Gastrointestinal:  Positive for diarrhea.   Genitourinary:  Negative for dysuria.   Musculoskeletal:  Negative for back pain.   Neurological:  Negative for headaches.   Psychiatric/Behavioral:  Negative for agitation and confusion.    All other systems reviewed and are negative.       Historical Information   Patient Active Problem List   Diagnosis    Alcohol use disorder    Alcohol withdrawal syndrome (HCC)    Hyponatremia    Hypokalemia    Hypomagnesemia    Hepatic steatosis     History reviewed. No pertinent past medical history.  Past Surgical History:   Procedure Laterality Date    KNEE SURGERY       Social History   Social History     Substance and Sexual Activity   Alcohol Use Yes    Comment: Fifth of vodka and 12 tall boys     Social History     Substance and Sexual Activity   Drug Use Never     Social History     Tobacco Use   Smoking Status Every Day    Current packs/day: 0.25    Average packs/day: 0.3 packs/day for 6.0 years (1.5 ttl pk-yrs)    Types: Cigarettes    Start date: 1/1/2018   Smokeless Tobacco Never     History reviewed. No  pertinent family history.    Meds/Allergies   current meds:   Current Facility-Administered Medications   Medication Dose Route Frequency    aluminum-magnesium hydroxide-simethicone (MAALOX) oral suspension 30 mL  30 mL Oral Q6H PRN    enoxaparin (LOVENOX) subcutaneous injection 40 mg  40 mg Subcutaneous Daily    folic acid (FOLVITE) tablet 1 mg  1 mg Oral Daily    hydrOXYzine HCL (ATARAX) tablet 50 mg  50 mg Oral Q6H PRN    multivitamin-minerals (CENTRUM) tablet 1 tablet  1 tablet Oral Daily    nicotine (NICODERM CQ) 7 mg/24hr TD 24 hr patch 1 patch  1 patch Transdermal Daily    ondansetron (ZOFRAN) injection 4 mg  4 mg Intravenous Q6H PRN    potassium chloride (K-DUR,KLOR-CON) CR tablet 40 mEq  40 mEq Oral Daily    thiamine (VITAMIN B1) 500 mg in sodium chloride 0.9 % 50 mL IVPB  500 mg Intravenous Q8H VARGAS    and PTA meds:   None       Scheduled Meds:  Current Facility-Administered Medications   Medication Dose Route Frequency Provider Last Rate    aluminum-magnesium hydroxide-simethicone  30 mL Oral Q6H PRN Jannette Fletcher PA-C      enoxaparin  40 mg Subcutaneous Daily Jannette Fletcher PA-C      folic acid  1 mg Oral Daily Jannette Fletcher PA-C      hydrOXYzine HCL  50 mg Oral Q6H PRN Herminia Urbano PA-C      multivitamin-minerals  1 tablet Oral Daily Jannette Fletcher PA-C      nicotine  1 patch Transdermal Daily Jannette Fletcher PA-C      ondansetron  4 mg Intravenous Q6H PRN Jannette Fletcher PA-C      potassium chloride  40 mEq Oral Daily Jannette Fletcher PA-C      thiamine  500 mg Intravenous Q8H VARGAS Jannette Fletcher PA-C 500 mg (01/05/24 0804)       PRN Meds:.  aluminum-magnesium hydroxide-simethicone    hydrOXYzine HCL    ondansetron    Continuous Infusions:       Allergies   Allergen Reactions    Acetaminophen Other (See Comments)     Other reaction(s): LIVER ISSUES    Aspirin GI Intolerance    Codeine Hives    Cyclobenzaprine Hives    Ketorolac  Tromethamine Hives    Lamotrigine Other (See Comments)     lower extremity pain    Methocarbamol Hives    Nsaids Other (See Comments)     GI upset:Toradol=allergy    Tramadol Hives     Other reaction(s): rash         Invasive Devices:     Invasive Devices       Peripheral Intravenous Line  Duration             Peripheral IV 24 Left;Proximal;Ventral (anterior) Forearm <1 day                      PHYSICAL EXAM  /68 (BP Location: Left arm)   Pulse 102   Temp 97.9 °F (36.6 °C) (Temporal)   Resp 18   Ht 5' (1.524 m)   Wt 42.2 kg (93 lb)   SpO2 100%   BMI 18.16 kg/m²   Temp (24hrs), Av.5 °F (36.4 °C), Min:97 °F (36.1 °C), Max:97.9 °F (36.6 °C)      No intake or output data in the 24 hours ending 24 0805    No intake/output data recorded.          Current Weight: Weight - Scale: 42.2 kg (93 lb)  First Weight: Weight - Scale: 43.5 kg (96 lb)  Physical Exam  Vitals and nursing note reviewed.   Constitutional:       General: She is not in acute distress.     Appearance: Normal appearance. She is normal weight. She is ill-appearing. She is not toxic-appearing or diaphoretic.   HENT:      Head: Normocephalic and atraumatic.      Mouth/Throat:      Pharynx: No oropharyngeal exudate.   Eyes:      General: No scleral icterus.  Cardiovascular:      Rate and Rhythm: Normal rate.   Pulmonary:      Effort: No respiratory distress.      Breath sounds: Normal breath sounds. No stridor.   Abdominal:      Palpations: Abdomen is soft.      Tenderness: There is no abdominal tenderness.   Musculoskeletal:         General: No swelling.      Cervical back: Normal range of motion. No rigidity.   Skin:     Coloration: Skin is not jaundiced.   Neurological:      General: No focal deficit present.      Mental Status: She is alert and oriented to person, place, and time.   Psychiatric:         Mood and Affect: Mood normal.         Behavior: Behavior normal.           LABORATORY:    Results from last 7 days   Lab Units  "01/05/24  0107 01/04/24  1434 01/04/24  1253   WBC Thousand/uL  --   --  7.69   HEMOGLOBIN g/dL  --   --  10.5*   HEMATOCRIT %  --   --  28.8*   PLATELETS Thousands/uL  --   --  117*   POTASSIUM mmol/L 2.1* 3.0* 2.4*   CHLORIDE mmol/L 94* 86* 86*   CO2 mmol/L 21 19* 19*   BUN mg/dL 3* 3* 3*   CREATININE mg/dL 0.47* 0.44* 0.47*   CALCIUM mg/dL 8.0* 7.8* 8.4   MAGNESIUM mg/dL  --  1.6*  --       rest all reviewed    RADIOLOGY:  CT abdomen pelvis with contrast   Final Result by Shivam Arellano MD (01/04 1450)      Marked hepatomegaly with severe fatty infiltration. Portal hypertension.      Uncomplicated colonic diverticulosis.      Diffuse osteopenia, advanced for stated age.            Workstation performed: [id number]           Rest all reviewed    Portions of the record may have been created with voice recognition software. Occasional wrong word or \"sound a like\" substitutions may have occurred due to the inherent limitations of voice recognition software. Read the chart carefully and recognize, using context, where substitutions have occurred.If you have any questions, please contact the dictating provider.    "

## 2024-01-05 NOTE — EMTALA/ACUTE CARE TRANSFER
[facility] 5 TOWER INPATIENT DETOX  [address] [facility] [address]  [phone]  Dept: [phone]      ACUTE CARE TRANSFER CONSENT    NAME Leslye Holland                                         1975                              MRN [id number]    I have been informed of my rights regarding examination, treatment, and transfer   by Dr. Jenifer Terrazas*    Benefits: Specialized equipment and/or services available at the receiving facility (Include comment)________________________ (Critical care, GI)    Risks: Potential for delay in receiving treatment, Potential deterioration of medical condition, Loss of IV, Possible worsening of condition or death during transfer, Increased discomfort during transfer      Consent for Transfer:  I acknowledge that my medical condition has been evaluated and explained to me by the treating physician or other qualified medical person and/or my attending physician, who has recommended that I be transferred to the service of  Accepting Physician: Dr. Usha Gonzales at Accepting Facility Name, City & State : [facility]. The above potential benefits of such transfer, the potential risks associated with such transfer, and the probable risks of not being transferred have been explained to me, and I fully understand them.  The doctor has explained that, in my case, the benefits of transfer outweigh the risks.  I agree to be transferred.    I authorize the performance of emergency medical procedures and treatments upon me in both transit and upon arrival at the receiving facility.  Additionally, I authorize the release of any and all medical records to the receiving facility and request they be transported with me, if possible.  I understand that the safest mode of transportation during a medical emergency is an ambulance and that the Hospital advocates the use of this mode of transport. Risks of traveling to the receiving  facility by car, including absence of medical control, life sustaining equipment, such as oxygen, and medical personnel has been explained to me and I fully understand them.    (ROBB CORRECT BOX BELOW)  [  ]  I consent to the stated transfer and to be transported by ambulance/helicopter.  [  ]  I consent to the stated transfer, but refuse transportation by ambulance and accept full responsibility for my transportation by car.  I understand the risks of non-ambulance transfers and I exonerate the Hospital and its staff from any deterioration in my condition that results from this refusal.    X___________________________________________    DATE  24  TIME________  Signature of patient or legally responsible individual signing on patient behalf           RELATIONSHIP TO PATIENT_________________________          Provider Certification    NAME Leslye Holland                                         1975                              MRN [id number]    A medical screening exam was performed on the above named patient.  Based on the examination:    Condition Necessitating Transfer Acute GI bleeding in setting of acute alcohol withdrawal    Patient Condition: The patient has been stabilized such that within reasonable medical probability, no material deterioration of the patient condition or the condition of the unborn child(david) is likely to result from the transfer    Reason for Transfer: Level of Care needed not available at this facility (Critical care/GI)    Transfer Requirements: Facility [facility]   Space available and qualified personnel available for treatment as acknowledged by    Agreed to accept transfer and to provide appropriate medical treatment as acknowledged by       Dr. Usha Gonzales  Appropriate medical records of the examination and treatment of the patient are provided at the time of transfer   STAFF INITIAL WHEN COMPLETED _______  Transfer will be performed by  qualified personnel from    and appropriate transfer equipment as required, including the use of necessary and appropriate life support measures.    Provider Certification: I have examined the patient and explained the following risks and benefits of being transferred/refusing transfer to the patient/family:  General risk, such as traffic hazards, adverse weather conditions, rough terrain or turbulence, possible failure of equipment (including vehicle or aircraft), or consequences of actions of persons outside the control of the transport personnel, Unanticipated needs of medical equipment and personnel during transport, Risk of worsening condition      Based on these reasonable risks and benefits to the patient and/or the unborn child(david), and based upon the information available at the time of the patient’s examination, I certify that the medical benefits reasonably to be expected from the provision of appropriate medical treatments at another medical facility outweigh the increasing risks, if any, to the individual’s medical condition, and in the case of labor to the unborn child, from effecting the transfer.    X____________________________________________ DATE 01/05/24        TIME_______      ORIGINAL - SEND TO MEDICAL RECORDS   COPY - SEND WITH PATIENT DURING TRANSFER

## 2024-01-05 NOTE — NURSING NOTE
RN entered room because pt's tele leads were off.  Pt had removed IV access.  RN unable to obtain new IV access.  Herminia Urbano PA-C notified via face-to-face conversation.

## 2024-01-05 NOTE — ASSESSMENT & PLAN NOTE
Patient with mildly worsening increased anion gap and worsening CO2  Beta-hydroxybutyrate 2.8 on admission  Given D5LR 500 cc bolus this afternoon  On high dose thiamine  Continue to encourage PO intake

## 2024-01-05 NOTE — PROGRESS NOTES
SEWS score discussed with Herminia Urbano PA-C.  Per JOLENE, we will try PO atarax at this time for symptoms of anxiety and fine tremors.  New orders obtained.

## 2024-01-05 NOTE — TELEMEDICINE
e-Consult (IPC)  - Interventional Radiology  Leslye Holland 48 y.o. female MRN: [id number]  Unit/Bed#: 5T DETOX 510-01 Encounter: [id number]          Interventional Radiology has been consulted to evaluate Leslye Holland      Inpatient Consult to IR  Consult performed by: Luis Son MD  Consult ordered by: Selin Crisostomo PA-C        01/05/24    Assessment/Recommendation:   Consult for midline placement.  Already inserted this morning.    >5 min, >50% time spent reviewing/analyzing record, written report will be generated in the EMR.     Thank you for allowing Interventional Radiology to participate in the care of Leslye Holland. Please don't hesitate to call or TigerText us with any questions.     Luis Son MD

## 2024-01-05 NOTE — NURSING NOTE
Pt rings call bell c/o L arm pain at IV site.  RN assessed site, IV infiltrated.  Herminia Urbano PA-C notified via face-to-face conversation.  Pharmacy called d/t potassium running, compress advised.  ED charge RN contacted for request for new ultrasound guided IV.

## 2024-01-05 NOTE — PLAN OF CARE
Problem: OCCUPATIONAL THERAPY ADULT  Goal: Performs self-care activities at highest level of function for planned discharge setting.  See evaluation for individualized goals.  Description: Treatment Interventions: ADL retraining, Functional transfer training, UE strengthening/ROM, Endurance training, Continued evaluation, Energy conservation          See flowsheet documentation for full assessment, interventions and recommendations.   Outcome: Progressing  Note: Limitation: Decreased ADL status, Decreased UE strength, Decreased Safe judgement during ADL, Decreased endurance, Decreased high-level ADLs  Prognosis: Good  Assessment: Pt is a 48 y.o. female seen for OT evaluation s/p admit to [facility] on 1/4/2024 w/ Alcohol withdrawal syndrome (HCC).  See medical history above for extensive list of comorbidities affecting pt's functional performance at time of assessment. Personal factors affecting Pt at time of IE include:limited home support, behavioral pattern, difficulty performing IADLS , and health management . Upon evaluation: Pt requires Abdirizak for functional ambulation mobility Abdirizak for ADL transfers.  Pt requires mod-maxA for ADLs in standing. Pt received in bed in flexed position, guarded. Pt with noted tremors at rest and with movement, thus impacting stability and GMC/FMC. Pt's primary barrier(s) at this time: decreased coordination, safety, and sustained attention.. The following deficits impact occupational performance: weakness, decreased strength, decreased balance, decreased tolerance, and decreased safety awareness. Pt to benefit from continued skilled OT services while in the hospital to address deficits as defined above and maximize level of functional independence w ADL's and functional mobility. Occupational performance areas to address include: bathing/shower, toilet hygiene, dressing, health maintenance, functional mobility, and clothing management. From OT standpoint, recommendation at time of d/c  would be moderate resource intensity.       Rehab Resource Intensity Level, OT: II (Moderate Resource Intensity)

## 2024-01-05 NOTE — MALNUTRITION/BMI
This medical record reflects one or more clinical indicators suggestive of malnutrition and/or morbid obesity.    Malnutrition Findings:   Adult Malnutrition type: Chronic illness  Adult Degree of Malnutrition: Malnutrition of mild degree                     360 Statement: Mild malnutrition r/t excessive alcohol intake as evidenced by BMI 18.1, unable to eat sufficient energy/protein to maintain a helathy weight, excessive alcohol intake reduces appetite mild fat loss noted in temple areas. Treated with Ensure Plus high protein tid    BMI Findings:  Adult BMI Classifications: Underweight < 18.5        Body mass index is 18.16 kg/m².     See Nutrition note dated 1/5/24 for additional details.  Completed nutrition assessment is viewable in the nutrition documentation.

## 2024-01-05 NOTE — ASSESSMENT & PLAN NOTE
Concern for chronic eating disorder in this patient.  Plan to give 500 cc bolus of D5LR, as patient is not eating on this admission and appears dry on physical exam.  This plan was approved by nephrology, who had earlier plan of holding fluids.  BMI Findings:  Adult BMI Classifications: Underweight < 18.5        Body mass index is 18.16 kg/m².

## 2024-01-05 NOTE — ASSESSMENT & PLAN NOTE
Malnutrition Findings:   Adult Malnutrition type: Chronic illness  Adult Degree of Malnutrition: Malnutrition of mild degree                     360 Statement: Mild malnutrition r/t excessive alcohol intake as evidenced by BMI 18.1, unable to eat sufficient energy/protein to maintain a helathy weight, excessive alcohol intake reduces appetite mild fat loss noted in temple areas. Treated with Ensure Plus high protein tid    BMI Findings:  Adult BMI Classifications: Underweight < 18.5        Body mass index is 18.16 kg/m².

## 2024-01-05 NOTE — SOCIAL WORK
attempted to meet with pt to complete intake assessment.  was unable to complete intake assessment due to the pt being asleep.

## 2024-01-05 NOTE — TREATMENT TEAM
Reviewed most recent 2 PM labs sodium stable at 132 mEq.  Potassium still low ordered for additional K-Dur supplementation for today.  Continue every 8 lab work.  Sodium at appropriate level aiming now for serum sodium around 136 mEq x 1/6/24 1 PM.  Calling parameters adjusted call if sodium less than 132 mEq or greater than 136 mEq    Deborah Briggs MD, FASN, 1/5/2024, 2:36 PM

## 2024-01-06 ENCOUNTER — APPOINTMENT (OUTPATIENT)
Dept: ULTRASOUND IMAGING | Facility: HOSPITAL | Age: 49
DRG: 253 | End: 2024-01-06
Payer: COMMERCIAL

## 2024-01-06 ENCOUNTER — TELEPHONE (OUTPATIENT)
Dept: ULTRASOUND IMAGING | Facility: HOSPITAL | Age: 49
End: 2024-01-06

## 2024-01-06 LAB
ABO GROUP BLD: NORMAL
ALBUMIN SERPL BCP-MCNC: 2.9 G/DL (ref 3.5–5)
ALBUMIN SERPL BCP-MCNC: 2.9 G/DL (ref 3.5–5)
ALP SERPL-CCNC: 218 U/L (ref 34–104)
ALP SERPL-CCNC: 218 U/L (ref 34–104)
ALT SERPL W P-5'-P-CCNC: 26 U/L (ref 7–52)
ALT SERPL W P-5'-P-CCNC: 26 U/L (ref 7–52)
AMMONIA PLAS-SCNC: 99 UMOL/L (ref 18–72)
ANION GAP SERPL CALCULATED.3IONS-SCNC: 13 MMOL/L
ANION GAP SERPL CALCULATED.3IONS-SCNC: 6 MMOL/L
ANION GAP SERPL CALCULATED.3IONS-SCNC: 8 MMOL/L
ANION GAP SERPL CALCULATED.3IONS-SCNC: 9 MMOL/L
AST SERPL W P-5'-P-CCNC: 69 U/L (ref 13–39)
AST SERPL W P-5'-P-CCNC: 70 U/L (ref 13–39)
BASOPHILS # BLD MANUAL: 0 THOUSAND/UL (ref 0–0.1)
BASOPHILS NFR MAR MANUAL: 0 % (ref 0–1)
BILIRUB DIRECT SERPL-MCNC: 2.78 MG/DL (ref 0–0.2)
BILIRUB DIRECT SERPL-MCNC: 2.79 MG/DL (ref 0–0.2)
BILIRUB SERPL-MCNC: 4.5 MG/DL (ref 0.2–1)
BILIRUB SERPL-MCNC: 4.5 MG/DL (ref 0.2–1)
BUN SERPL-MCNC: 3 MG/DL (ref 5–25)
CALCIUM SERPL-MCNC: 7 MG/DL (ref 8.4–10.2)
CALCIUM SERPL-MCNC: 7.2 MG/DL (ref 8.4–10.2)
CALCIUM SERPL-MCNC: 7.2 MG/DL (ref 8.4–10.2)
CALCIUM SERPL-MCNC: 7.9 MG/DL (ref 8.4–10.2)
CHLORIDE SERPL-SCNC: 110 MMOL/L (ref 96–108)
CHLORIDE SERPL-SCNC: 113 MMOL/L (ref 96–108)
CHLORIDE SERPL-SCNC: 114 MMOL/L (ref 96–108)
CHLORIDE SERPL-SCNC: 114 MMOL/L (ref 96–108)
CO2 SERPL-SCNC: 13 MMOL/L (ref 21–32)
CO2 SERPL-SCNC: 14 MMOL/L (ref 21–32)
CO2 SERPL-SCNC: 16 MMOL/L (ref 21–32)
CO2 SERPL-SCNC: 18 MMOL/L (ref 21–32)
CREAT SERPL-MCNC: 0.34 MG/DL (ref 0.6–1.3)
CREAT SERPL-MCNC: 0.37 MG/DL (ref 0.6–1.3)
CREAT SERPL-MCNC: 0.38 MG/DL (ref 0.6–1.3)
CREAT SERPL-MCNC: 0.39 MG/DL (ref 0.6–1.3)
EOSINOPHIL # BLD MANUAL: 0 THOUSAND/UL (ref 0–0.4)
EOSINOPHIL NFR BLD MANUAL: 0 % (ref 0–6)
ERYTHROCYTE [DISTWIDTH] IN BLOOD BY AUTOMATED COUNT: 13.3 % (ref 11.6–15.1)
FERRITIN SERPL-MCNC: 650 NG/ML (ref 11–307)
GFR SERPL CREATININE-BSD FRML MDRD: 125 ML/MIN/1.73SQ M
GFR SERPL CREATININE-BSD FRML MDRD: 126 ML/MIN/1.73SQ M
GFR SERPL CREATININE-BSD FRML MDRD: 127 ML/MIN/1.73SQ M
GFR SERPL CREATININE-BSD FRML MDRD: 131 ML/MIN/1.73SQ M
GLUCOSE SERPL-MCNC: 116 MG/DL (ref 65–140)
GLUCOSE SERPL-MCNC: 126 MG/DL (ref 65–140)
GLUCOSE SERPL-MCNC: 93 MG/DL (ref 65–140)
GLUCOSE SERPL-MCNC: 97 MG/DL (ref 65–140)
HCT VFR BLD AUTO: 23 % (ref 34.8–46.1)
HCT VFR BLD AUTO: 24 % (ref 34.8–46.1)
HCT VFR BLD AUTO: 25 % (ref 34.8–46.1)
HCT VFR BLD AUTO: 26.2 % (ref 34.8–46.1)
HCT VFR BLD AUTO: 26.3 % (ref 34.8–46.1)
HGB BLD-MCNC: 7.8 G/DL (ref 11.5–15.4)
HGB BLD-MCNC: 7.9 G/DL (ref 11.5–15.4)
HGB BLD-MCNC: 8.4 G/DL (ref 11.5–15.4)
HGB BLD-MCNC: 8.7 G/DL (ref 11.5–15.4)
HGB BLD-MCNC: 9.1 G/DL (ref 11.5–15.4)
IGA SERPL-MCNC: 349 MG/DL (ref 66–433)
IGG SERPL-MCNC: 1029 MG/DL (ref 635–1741)
IGM SERPL-MCNC: 135 MG/DL (ref 45–281)
INR PPP: 1.21 (ref 0.84–1.19)
IRON SERPL-MCNC: 75 UG/DL (ref 50–212)
LACTATE SERPL-SCNC: 0.8 MMOL/L (ref 0.5–2)
LG PLATELETS BLD QL SMEAR: PRESENT
LYMPHOCYTES # BLD AUTO: 1.95 THOUSAND/UL (ref 0.6–4.47)
LYMPHOCYTES # BLD AUTO: 21 % (ref 14–44)
MAGNESIUM SERPL-MCNC: 1.5 MG/DL (ref 1.9–2.7)
MAGNESIUM SERPL-MCNC: 1.7 MG/DL (ref 1.9–2.7)
MAGNESIUM SERPL-MCNC: 1.8 MG/DL (ref 1.9–2.7)
MAGNESIUM SERPL-MCNC: 2.6 MG/DL (ref 1.9–2.7)
MCH RBC QN AUTO: 31.9 PG (ref 26.8–34.3)
MCHC RBC AUTO-ENTMCNC: 34.6 G/DL (ref 31.4–37.4)
MCV RBC AUTO: 92 FL (ref 82–98)
MONOCYTES # BLD AUTO: 0.93 THOUSAND/UL (ref 0–1.22)
MONOCYTES NFR BLD: 10 % (ref 4–12)
MYELOCYTES NFR BLD MANUAL: 1 % (ref 0–1)
NEUTROPHILS # BLD MANUAL: 6.3 THOUSAND/UL (ref 1.85–7.62)
NEUTS BAND NFR BLD MANUAL: 7 % (ref 0–8)
NEUTS SEG NFR BLD AUTO: 61 % (ref 43–75)
OVALOCYTES BLD QL SMEAR: PRESENT
PHOSPHATE SERPL-MCNC: 2.1 MG/DL (ref 2.7–4.5)
PHOSPHATE SERPL-MCNC: 3.1 MG/DL (ref 2.7–4.5)
PHOSPHATE SERPL-MCNC: 3.8 MG/DL (ref 2.7–4.5)
PHOSPHATE SERPL-MCNC: <1 MG/DL (ref 2.7–4.5)
PLATELET # BLD AUTO: 136 THOUSANDS/UL (ref 149–390)
PLATELET BLD QL SMEAR: ABNORMAL
PMV BLD AUTO: 9.8 FL (ref 8.9–12.7)
POTASSIUM SERPL-SCNC: 3.3 MMOL/L (ref 3.5–5.3)
POTASSIUM SERPL-SCNC: 3.4 MMOL/L (ref 3.5–5.3)
POTASSIUM SERPL-SCNC: 3.5 MMOL/L (ref 3.5–5.3)
POTASSIUM SERPL-SCNC: 3.5 MMOL/L (ref 3.5–5.3)
PROT SERPL-MCNC: 5.7 G/DL (ref 6.4–8.4)
PROT SERPL-MCNC: 5.8 G/DL (ref 6.4–8.4)
PROTHROMBIN TIME: 15.5 SECONDS (ref 11.6–14.5)
RBC # BLD AUTO: 2.85 MILLION/UL (ref 3.81–5.12)
RBC MORPH BLD: PRESENT
RH BLD: NEGATIVE
SODIUM SERPL-SCNC: 134 MMOL/L (ref 135–147)
SODIUM SERPL-SCNC: 135 MMOL/L (ref 135–147)
SODIUM SERPL-SCNC: 138 MMOL/L (ref 135–147)
SODIUM SERPL-SCNC: 141 MMOL/L (ref 135–147)
TIBC SERPL-MCNC: <130 UG/DL (ref 250–450)
UIBC SERPL-MCNC: <55 UG/DL (ref 155–355)
WBC # BLD AUTO: 9.27 THOUSAND/UL (ref 4.31–10.16)

## 2024-01-06 PROCEDURE — 99232 SBSQ HOSP IP/OBS MODERATE 35: CPT | Performed by: INTERNAL MEDICINE

## 2024-01-06 PROCEDURE — 84100 ASSAY OF PHOSPHORUS: CPT

## 2024-01-06 PROCEDURE — 83540 ASSAY OF IRON: CPT | Performed by: INTERNAL MEDICINE

## 2024-01-06 PROCEDURE — 86015 ACTIN ANTIBODY EACH: CPT | Performed by: INTERNAL MEDICINE

## 2024-01-06 PROCEDURE — 85014 HEMATOCRIT: CPT | Performed by: NURSE PRACTITIONER

## 2024-01-06 PROCEDURE — 82103 ALPHA-1-ANTITRYPSIN TOTAL: CPT | Performed by: INTERNAL MEDICINE

## 2024-01-06 PROCEDURE — 86381 MITOCHONDRIAL ANTIBODY EACH: CPT | Performed by: INTERNAL MEDICINE

## 2024-01-06 PROCEDURE — 85610 PROTHROMBIN TIME: CPT | Performed by: NURSE PRACTITIONER

## 2024-01-06 PROCEDURE — 85007 BL SMEAR W/DIFF WBC COUNT: CPT | Performed by: NURSE PRACTITIONER

## 2024-01-06 PROCEDURE — 87340 HEPATITIS B SURFACE AG IA: CPT | Performed by: INTERNAL MEDICINE

## 2024-01-06 PROCEDURE — 83605 ASSAY OF LACTIC ACID: CPT

## 2024-01-06 PROCEDURE — 85027 COMPLETE CBC AUTOMATED: CPT | Performed by: NURSE PRACTITIONER

## 2024-01-06 PROCEDURE — 82728 ASSAY OF FERRITIN: CPT | Performed by: INTERNAL MEDICINE

## 2024-01-06 PROCEDURE — 86704 HEP B CORE ANTIBODY TOTAL: CPT | Performed by: INTERNAL MEDICINE

## 2024-01-06 PROCEDURE — 86708 HEPATITIS A ANTIBODY: CPT | Performed by: INTERNAL MEDICINE

## 2024-01-06 PROCEDURE — 80048 BASIC METABOLIC PNL TOTAL CA: CPT

## 2024-01-06 PROCEDURE — 82140 ASSAY OF AMMONIA: CPT | Performed by: NURSE PRACTITIONER

## 2024-01-06 PROCEDURE — 85018 HEMOGLOBIN: CPT | Performed by: NURSE PRACTITIONER

## 2024-01-06 PROCEDURE — 99222 1ST HOSP IP/OBS MODERATE 55: CPT | Performed by: INTERNAL MEDICINE

## 2024-01-06 PROCEDURE — 83735 ASSAY OF MAGNESIUM: CPT | Performed by: NURSE PRACTITIONER

## 2024-01-06 PROCEDURE — 82390 ASSAY OF CERULOPLASMIN: CPT | Performed by: INTERNAL MEDICINE

## 2024-01-06 PROCEDURE — 83735 ASSAY OF MAGNESIUM: CPT

## 2024-01-06 PROCEDURE — 80076 HEPATIC FUNCTION PANEL: CPT | Performed by: NURSE PRACTITIONER

## 2024-01-06 PROCEDURE — 82784 ASSAY IGA/IGD/IGG/IGM EACH: CPT | Performed by: INTERNAL MEDICINE

## 2024-01-06 PROCEDURE — 84100 ASSAY OF PHOSPHORUS: CPT | Performed by: NURSE PRACTITIONER

## 2024-01-06 PROCEDURE — C9113 INJ PANTOPRAZOLE SODIUM, VIA: HCPCS | Performed by: NURSE PRACTITIONER

## 2024-01-06 PROCEDURE — 86803 HEPATITIS C AB TEST: CPT | Performed by: INTERNAL MEDICINE

## 2024-01-06 PROCEDURE — 86706 HEP B SURFACE ANTIBODY: CPT | Performed by: INTERNAL MEDICINE

## 2024-01-06 PROCEDURE — 86038 ANTINUCLEAR ANTIBODIES: CPT | Performed by: INTERNAL MEDICINE

## 2024-01-06 PROCEDURE — 83550 IRON BINDING TEST: CPT | Performed by: INTERNAL MEDICINE

## 2024-01-06 PROCEDURE — 80048 BASIC METABOLIC PNL TOTAL CA: CPT | Performed by: NURSE PRACTITIONER

## 2024-01-06 RX ORDER — SODIUM CHLORIDE, SODIUM LACTATE, POTASSIUM CHLORIDE, CALCIUM CHLORIDE 600; 310; 30; 20 MG/100ML; MG/100ML; MG/100ML; MG/100ML
75 INJECTION, SOLUTION INTRAVENOUS CONTINUOUS
Status: DISCONTINUED | OUTPATIENT
Start: 2024-01-06 | End: 2024-01-07

## 2024-01-06 RX ORDER — CEFTRIAXONE 1 G/50ML
1000 INJECTION, SOLUTION INTRAVENOUS EVERY 24 HOURS
Status: COMPLETED | OUTPATIENT
Start: 2024-01-06 | End: 2024-01-12

## 2024-01-06 RX ORDER — ALBUMIN, HUMAN INJ 5% 5 %
12.5 SOLUTION INTRAVENOUS ONCE
Status: COMPLETED | OUTPATIENT
Start: 2024-01-06 | End: 2024-01-06

## 2024-01-06 RX ADMIN — ALBUMIN HUMAN 12.5 G: 0.05 INJECTION, SOLUTION INTRAVENOUS at 04:39

## 2024-01-06 RX ADMIN — SODIUM CHLORIDE, SODIUM LACTATE, POTASSIUM CHLORIDE, AND CALCIUM CHLORIDE 75 ML/HR: .6; .31; .03; .02 INJECTION, SOLUTION INTRAVENOUS at 05:16

## 2024-01-06 RX ADMIN — OCTREOTIDE ACETATE 25 MCG/HR: 500 INJECTION, SOLUTION INTRAVENOUS; SUBCUTANEOUS at 13:11

## 2024-01-06 RX ADMIN — PANTOPRAZOLE SODIUM 40 MG: 40 INJECTION, POWDER, FOR SOLUTION INTRAVENOUS at 08:50

## 2024-01-06 RX ADMIN — THIAMINE HYDROCHLORIDE 500 MG: 100 INJECTION, SOLUTION INTRAMUSCULAR; INTRAVENOUS at 05:16

## 2024-01-06 RX ADMIN — ENOXAPARIN SODIUM 40 MG: 40 INJECTION SUBCUTANEOUS at 08:50

## 2024-01-06 RX ADMIN — OXAZEPAM 25 MG: 10 CAPSULE, GELATIN COATED ORAL at 15:04

## 2024-01-06 RX ADMIN — POTASSIUM PHOSPHATE, MONOBASIC POTASSIUM PHOSPHATE, DIBASIC 30 MMOL: 224; 236 INJECTION, SOLUTION, CONCENTRATE INTRAVENOUS at 06:54

## 2024-01-06 RX ADMIN — POTASSIUM PHOSPHATE, MONOBASIC POTASSIUM PHOSPHATE, DIBASIC 30 MMOL: 224; 236 INJECTION, SOLUTION, CONCENTRATE INTRAVENOUS at 00:35

## 2024-01-06 RX ADMIN — OXAZEPAM 25 MG: 10 CAPSULE, GELATIN COATED ORAL at 21:36

## 2024-01-06 RX ADMIN — THIAMINE HYDROCHLORIDE 500 MG: 100 INJECTION, SOLUTION INTRAMUSCULAR; INTRAVENOUS at 15:02

## 2024-01-06 RX ADMIN — THIAMINE HYDROCHLORIDE 500 MG: 100 INJECTION, SOLUTION INTRAMUSCULAR; INTRAVENOUS at 01:13

## 2024-01-06 RX ADMIN — CHLORHEXIDINE GLUCONATE 0.12% ORAL RINSE 15 ML: 1.2 LIQUID ORAL at 08:50

## 2024-01-06 RX ADMIN — CHLORHEXIDINE GLUCONATE 0.12% ORAL RINSE 15 ML: 1.2 LIQUID ORAL at 21:45

## 2024-01-06 RX ADMIN — FOLIC ACID 1 MG: 5 INJECTION, SOLUTION INTRAMUSCULAR; INTRAVENOUS; SUBCUTANEOUS at 09:28

## 2024-01-06 RX ADMIN — CEFTRIAXONE 1000 MG: 1 INJECTION, SOLUTION INTRAVENOUS at 11:48

## 2024-01-06 RX ADMIN — PANTOPRAZOLE SODIUM 40 MG: 40 INJECTION, POWDER, FOR SOLUTION INTRAVENOUS at 21:40

## 2024-01-06 RX ADMIN — POTASSIUM CHLORIDE 20 MEQ: 14.9 INJECTION, SOLUTION INTRAVENOUS at 01:14

## 2024-01-06 RX ADMIN — THIAMINE HYDROCHLORIDE 500 MG: 100 INJECTION, SOLUTION INTRAMUSCULAR; INTRAVENOUS at 21:50

## 2024-01-06 RX ADMIN — Medication 14 MG: at 08:58

## 2024-01-06 RX ADMIN — SODIUM CHLORIDE, SODIUM LACTATE, POTASSIUM CHLORIDE, AND CALCIUM CHLORIDE 75 ML/HR: .6; .31; .03; .02 INJECTION, SOLUTION INTRAVENOUS at 14:51

## 2024-01-06 NOTE — ASSESSMENT & PLAN NOTE
Serum sodium 132 prior to transfer, for last 24 hours gracie sodium 122  Likely in the setting of alcohol abuse, poor nutrition, beer potomania  Most recent serum sodium 134, peers dehydrated    Plan:  Continue serial BMP every 6 hours  Avoid overcorrection  Nephrology was consulted, appreciate recommendations  Goal serum sodium 136 by 1 PM 1/6/24   Start LR at 75 mL/h

## 2024-01-06 NOTE — ASSESSMENT & PLAN NOTE
Malnutrition Findings: muscle waisting, decreased body hair.                                BMI Findings:           Body mass index is 17.7 kg/m².     Plan:  N.p.o. for now while having hematemesis  Convert medications to IV as able  Nutrition consult

## 2024-01-06 NOTE — ASSESSMENT & PLAN NOTE
2/2 EtOH abuse and poor nutrition  Serum phos prior to transfer < 1, was receiving replacement at [facility] detox unit    Plan:  Check phos now and q6h  Replace as needed for goal phos > 3.

## 2024-01-06 NOTE — ASSESSMENT & PLAN NOTE
In the setting of EtOH abuse and poor solute intake  Most recent BMP prior to transfer with Anion gap 11, though was 15 prior.    Plan:  Check BMP now  Will need to be cautious with IVFs as we want to avoid over correction of serum Na  NPO for now 2/2 recent hematemesis  Thiamine and folic acid as above.

## 2024-01-06 NOTE — ASSESSMENT & PLAN NOTE
In the setting of EtOH abuse and poor solute intake  Most recent BMP prior to transfer with Anion gap 11, though was 15 prior.  Continues to have an acidosis, though is not gapped    Plan:  Check BMP now  Will need to be cautious with IVFs as we want to avoid over correction of serum Na  NPO for now 2/2 recent hematemesis  Thiamine and folic acid as above

## 2024-01-06 NOTE — ASSESSMENT & PLAN NOTE
"While at [facility] Detox unit she had an episode of hematemesis  Hgb prior to transfer 11.1 > 9.3 > 9.2  She was started on PPI BID prior to transfer  Likely has a degree of baseline anemia in the setting of EtOH abuse and poor nutrition  Has had EGDs in the past at [facility].  Last EGD from Jan 2023 report with \"erosive gastritis with esophagitis, possible small saad-begum tear\"  She has been transfused in the past   Most recent hemoglobin 8.7    Plan:  Check CBC now  Transfuse as needed for hgb < 7  Continue protonix IV bid dosing   Type and screen   No immediate need for transfusion  Consult GI, appreciate recommendations  Follow hgb q6h  If has further hematemesis tonight may require EGD this evening.   "

## 2024-01-06 NOTE — H&P
"Critical access hospital  H&P  Name: Leslye Holland 48 y.o. female I MRN: [id number]  Unit/Bed#: ICU 06 I Date of Admission: 1/5/2024   Date of Service: 1/5/2024 I Hospital Day: 0      Assessment/Plan   * Hematemesis  Assessment & Plan  While at [facility] Detox unit she had an episode of hematemesis  Hgb prior to transfer 11.1 > 9.3 > 9.2  She was started on PPI BID prior to transfer  Likely has a degree of baseline anemia in the setting of EtOH abuse and poor nutrition  Has had EGDs in the past at [facility].  Last EGD from Jan 2023 report with \"erosive gastritis with esophagitis, possible small saad-begum tear\"  She has been transfused in the past     Plan:  Check CBC now  Transfuse as needed for hgb < 7  Continue protonix IV bid dosing   Type and screen   No immediate need for transfusion  Consult GI, appreciate recommendations  Follow hgb q6h  If has further hematemesis tonight may require EGD this evening.     Alcohol withdrawal with complication with inpatient treatment (HCC)  Assessment & Plan  Was transferred from [facility] Detox unit 2/2 hematemesis.  While in the Detox unit she received Valium 5 mg, and phenobarbital 845 mg.    Plan:  CIWA scoring, prefer to give phenobarbital over benzos  High dose thiamine  mg q8h x3 days, then thiamine  mg daily x3 days, then thiamine 100 mg daily  Npo for now 2/2 hematemesis  Fall precautions  Seizure precautions  Optimize electrolytes    Hepatic encephalopathy (HCC)  Assessment & Plan  2/2 EtOH cirrhosis  NH3 prior to transfer 178  Alert to person only on exam, currently protecting airway  Monitor mental status    Plan:  Start lactulose when able to take PO, may need to consider retention enemas if unable to take PO  Check NH3 in am  Serial neuro exams  High dose thiamine  mg q8h x3 days, then thiamine  mg daily x3 days, then thiamine 100 mg daily     Alcoholic ketoacidosis  Assessment & Plan  In the setting of EtOH abuse and poor solute " "intake  Most recent BMP prior to transfer with Anion gap 11, though was 15 prior.    Plan:  Check BMP now  Will need to be cautious with IVFs as we want to avoid over correction of serum Na  NPO for now 2/2 recent hematemesis  Thiamine and folic acid as above.    Hypokalemia  Assessment & Plan  Serum potassium prior to transfer 3.1, was treated with potassium supplementation at [facility] detox unit  Likely in the setting of beer potomania    Plan:  Check BMP, mag, Phos now  Replete as needed to maintain goal K > 4, Mag > 2, Phos > 3  Monitor UO and renal indices  EKG now    Hyponatremia  Assessment & Plan  Serum sodium 132 prior to transfer, for last 24 hours gracie sodium 122  Likely in the setting of alcohol abuse, poor nutrition, beer potomania    Plan:  Obtain BMP now  Avoid overcorrection  Nephrology was consulted, appreciate recommendations  Goal serum sodium 136 by 1 PM 1/6/24       Hypophosphatemia  Assessment & Plan  2/2 EtOH abuse and poor nutrition  Serum phos prior to transfer < 1, was receiving replacement at [facility] detox unit    Plan:  Check phos now and q6h  Replace as needed for goal phos > 3    Hepatic steatosis  Assessment & Plan  1/5/2024 CT abd/pel: hepatomegaly with fatty infiltration.  Per radiology read: \" Recannulized umbilical vein and multiple paraesophageal and upper abdominal varices consistent with portal hypertension.\"  Tbili prior to transfer 4.8    Plan:  Check MELD labs now and daily  Serial abdominal exams  RUQ US    Osteopenia  Assessment & Plan  Incidentally noted on CT from 1/5/2024.  Per radiology report, this is advanced for stated age  Will need oral calcium supplementation prior to discharge  Fall precautions, patient at high risk for fracture.     Mild protein-calorie malnutrition (HCC)  Assessment & Plan  Malnutrition Findings: muscle waisting, decreased body hair.                                BMI Findings:           Body mass index is 17.7 kg/m².     Plan:  N.p.o. for now while " having hematemesis  Convert medications to IV as able  Nutrition consult               History of Present Illness     HPI: Leslye Holland is a 48 y.o. who presents with PMHx EtOH abuse, previous anemia requiring transfusion, erosive gastritis/esophagitis, and previous EtOH withdrawal.  Of note she has had multiple admissions in the past for EtOH withdrawal and at one point was prescribed naltrexone.  She was seen in the ER 1/4/24 at [facility] with EtOH withdrawal symptoms requesting Xanax or Ativan.  Her labs revealed several electrolyte and metabolic derangements consistent with beer potomania. Medical Toxicology was consulted and she was admitted to the inpatient medical Detox unit at [facility].  While on the medical detox unit she has experienced hematemesis.  Hemoglobin from 11.1 to 9.3, on recheck 9.2.  Given concern for upper GIB vs gastritis she was transferred to [facility] ICU under medical critical care for further work up and treatment.  From a EtOH withdrawal treatment perspective she had received Valium 5 mg and Phenobarbital 845 mg in the last 24 hours.  Nephrology was consulted and recommendations were placed with regard to her hyponatremia.      History obtained from chart review and unobtainable from patient due to hepatic encephalopathy.  Review of Systems   Unable to perform ROS: Other (hepatic encephalopathy)     Disposition: Stepdown Level 1  Historical Information   No past medical history on file. Past Surgical History:  No date: KNEE SURGERY   No current outpatient medications Allergies   Allergen Reactions    Acetaminophen Other (See Comments)     Other reaction(s): LIVER ISSUES    Aspirin GI Intolerance    Codeine Hives    Cyclobenzaprine Hives    Ketorolac Tromethamine Hives    Lamotrigine Other (See Comments)     lower extremity pain    Methocarbamol Hives    Nsaids Other (See Comments)     GI upset:Toradol=allergy    Tramadol Hives     Other reaction(s): rash      Social History     Tobacco Use     Smoking status: Every Day     Current packs/day: 0.25     Average packs/day: 0.3 packs/day for 6.0 years (1.5 ttl pk-yrs)     Types: Cigarettes     Start date: 1/1/2018    Smokeless tobacco: Never   Substance Use Topics    Alcohol use: Yes     Comment: Fifth of vodka and 12 tall boys    Drug use: Never    No family history on file.       Objective                            Vitals I/O      Most Recent Min/Max in 24hrs   Temp 98.3 °F (36.8 °C) Temp  Min: 97.5 °F (36.4 °C)  Max: 98.3 °F (36.8 °C)   Pulse (!) 108 Pulse  Min: 75  Max: 113   Resp 18 Resp  Min: 18  Max: 18   /74 BP  Min: 90/54  Max: 134/89   O2 Sat 99 % SpO2  Min: 97 %  Max: 100 %    No intake or output data in the 24 hours ending 01/05/24 2103    Diet NPO; Sips with meds    Invasive Monitoring           Physical Exam   Physical Exam  Vitals and nursing note reviewed.   Eyes:      General: Vision grossly intact. Scleral icterus present. No visual field deficit.        Right eye: No discharge.         Left eye: No discharge.      Extraocular Movements: Extraocular movements intact.      Conjunctiva/sclera: Conjunctivae normal.      Pupils: Pupils are equal, round, and reactive to light.   Skin:     General: Skin is warm, dry and not mottled extremities.      Capillary Refill: Capillary refill takes less than 2 seconds.      Coloration: Skin is pale. Skin is not jaundiced.      Findings: No lesion, rash or wound.   HENT:      Head: Normocephalic and atraumatic.      Right Ear: Hearing normal. No drainage.      Left Ear: Hearing normal. No drainage.      Nose: No nasal deformity, nasal tenderness, congestion, rhinorrhea, epistaxis or nasogastric tube.      Mouth/Throat:      Mouth: Mucous membranes are dry. No injury or angioedema.      Dentition: Normal dentition.      Pharynx: No oropharyngeal exudate.      Comments: halitosis  Neck:      Vascular: No JVD.   no midline tenderness Cardiovascular:      Rate and Rhythm: Regular rhythm. Tachycardia  present.      Pulses: Decreased pulses.           Radial pulses are 1+ on the right side and 1+ on the left side.        Dorsalis pedis pulses are 1+ on the right side and 1+ on the left side.      Heart sounds: S1 normal and S2 normal. No murmur heard.     No friction rub. No gallop.   Musculoskeletal:         General: No swelling, tenderness, deformity or signs of injury. Normal range of motion.      Right lower leg: No edema.      Left lower leg: No edema.   Abdominal: General: Bowel sounds are decreased. There is no distension.      Palpations: Abdomen is soft.      Tenderness: There is abdominal tenderness. There is no guarding.   Constitutional:       General: She is not in acute distress.     Appearance: She is ill-appearing. She is not toxic-appearing.      Interventions: She is not sedated, not intubated and not restrained.  Pulmonary:      Effort: Pulmonary effort is normal. No accessory muscle usage, respiratory distress or accessory muscle usage. She is not intubated.      Breath sounds: Normal breath sounds. No wheezing, rhonchi or rales.   Neurological:      Mental Status: She is alert.      GCS: GCS eye subscore is 3. GCS verbal subscore is 4. GCS motor subscore is 5.      Cranial Nerves: Cranial nerves 2-12 are intact. No cranial nerve deficit, dysarthria or facial asymmetry.      Sensory: Sensation is intact.      Motor: Weakness (generalized.) and tremor. No atrophy or abnormal muscle tone.      Comments: Alert to person only.  Moves all extremities purposefully, but is not currently following commands.           Diagnostic Studies      EKG: sinus tachycardia  Imagin/5 CT head: no acute intracranial abnormality.   I have personally reviewed pertinent reports.   and I have personally reviewed pertinent films in PACS     Medications:  Scheduled PRN   chlorhexidine, 15 mL, Q12H VARGAS  dextrose, 250 mL, Once  [START ON 2024] enoxaparin, 40 mg, Daily  [START ON 2024] folic acid, 1 mg,  Daily  magnesium sulfate, 2 g, Once  [START ON 1/6/2024] multivitamin-minerals, 1 tablet, Daily  nicotine, 14 mg, Daily  pantoprazole, 40 mg, Q12H VARGAS  potassium chloride, 20 mEq, Once  potassium phosphate, 30 mmol, Once  thiamine, 500 mg, Q8H VARGAS      ondansetron, 4 mg, Q6H PRN       Continuous          Labs:    CBC    Recent Labs     01/05/24  0800 01/05/24  1400 01/05/24 2010   WBC 8.18  --  8.04   HGB 11.1* 9.3* 9.2*   HCT 31.2* 25.8* 26.2*   *  --  109*     BMP    Recent Labs     01/05/24  1400 01/05/24 2010   SODIUM 132* 134*   K 3.1* 3.6    109*   CO2 19* 17*   AGAP 11 8   BUN 4* 3*   CREATININE 0.41* 0.41*   CALCIUM 7.8* 8.2*       Coags    Recent Labs     01/05/24  1104 01/05/24 2010   INR 1.14 1.19        Additional Electrolytes  Recent Labs     01/05/24  1400 01/05/24 2010   MG 1.9 1.7*   PHOS <1.0* <1.0*          Blood Gas    No recent results  No recent results LFTs  Recent Labs     01/05/24  0800 01/05/24 2010   ALT 34 29   * 78*   ALKPHOS 266* 222*   ALB 3.6 3.1*   TBILI 4.80* 4.37*       Infectious  No recent results  Glucose  Recent Labs     01/05/24  0107 01/05/24  0800 01/05/24  1400 01/05/24 2010   GLUC 97 131  133 117 103             Critical Care Time Statement: Upon my evaluation, this patient had a high probability of imminent or life-threatening deterioration due to hematemesis/anemia/acute alcohol withdrawal, which required my direct attention, intervention, and personal management.  I spent a total of 45 minutes directly providing critical care services, including interpretation of complex medical databases, evaluating for the presence of life-threatening injuries or illnesses, management of organ system failure(s) , complex medical decision making (to support/prevent further life-threatening deterioration)., and interpretation of hemodynamic data. This time is exclusive of procedures, teaching, family meetings, and any prior time recorded by providers other than  myself.    Anticipated Length of Stay is > 2 midnights  CARLOS Saldana

## 2024-01-06 NOTE — UTILIZATION REVIEW
Initial Clinical Review    TRANSFERRED FROM [facility]OX  UNIT    Admission: Date/Time/Statement:   Admission Orders (From admission, onward)       Ordered        01/05/24 1949  Inpatient Admission  Once                          Orders Placed This Encounter   Procedures    Inpatient Admission     Standing Status:   Standing     Number of Occurrences:   1     Order Specific Question:   Level of Care     Answer:   Level 1 Stepdown [13]     Order Specific Question:   Estimated length of stay     Answer:   More than 2 Midnights     Order Specific Question:   Certification     Answer:   I certify that inpatient services are medically necessary for this patient for a duration of greater than two midnights. See H&P and MD Progress Notes for additional information about the patient's course of treatment.         Initial Presentation: 48 y.o. female admitted  to detox  unit on  1/4/24.  While on  detox unit had hematemesis.  Labs reveal  hemoglobin  drop from  11.1 to  9.3.  Transferred to [facility]   ICU   with concern for UGIB  vs  gastritis.  Received valium and phenobarb  in the  past  24 hrs prior to transfer  for alcohol withdrawal.   PMH is  ETOH abuse,  anemia requiring transfusions, erosive gastritis/esophagitis and ETOH withdrawal.  Ct abd  shows hepatomegaly.   Labs reveal low  sodium, low phosphate, low potassium.  Admit  IP  ICU LOC  with  Hematemesis, Hepatic  encephalopathy,  Alcoholic ketoacidosis and plan is  fall/seizure precautions, NPO,  CIWcores,  neuro checks,  MVI, monitor mental status, IV  protonix, GI consult, sandostatin drip  and replace electrolytes.        Date:   1/6       Day 2:   GI consult  Hemoglobin  7.9 this am.   Bp soft.  Had a  recent  EGD    which showed erosive gastritis with healing ulcers, evidence of portal hypertensive gastropathy without bleeding, LA grade B esophagitis with Lori-Tavera tear, questionable short segment Bill's esophagus, 2 cm hiatal hernia.  She was  recommended repeat EGD in 8 to 12 weeks however this has never been performed.   MELD  17.  Continue  IV  PPI and  starting sandostatin drip.   Transfuse for hmgb  < 7.  Continue LEIGHA.           Wt Readings from Last 1 Encounters:   01/06/24 43.9 kg (96 lb 12.5 oz)     Additional Vital Signs:   01/06/24 0815 -- 90 18 86/68 Abnormal  81 100 % -- --   01/06/24 0725 98 °F (36.7 °C) 98 16 85/73 Abnormal  78 100 % -- --   01/06/24 0600 98.1 °F (36.7 °C) 96 19 -- -- 100 % -- --   01/06/24 0533 -- 94 20 93/69 76 100 % -- --   01/06/24 0530 -- 92 19 93/69 76 100 % -- --   01/06/24 0500 -- 104 27 Abnormal  -- -- 98 % -- --   01/06/24 0400 -- 98 17 -- -- 100 % -- --   01/06/24 0330 -- 100 25 Abnormal  87/64 Abnormal  71 100 % -- --   01/06/24 0315 -- 96 21 80/59 Abnormal  64 Abnormal  100 % -- --   01/06/24 0300 -- 90 22 71/46 Abnormal  52 Abnormal  100 % -- --   01/06/24 0200 -- 94 17 82/60 Abnormal  69 100 % -- --   01/06/24 0100 -- 88 19 82/55 Abnormal  62 Abnormal  100 % -- --   01/06/24 0030 -- 90 20 83/57 Abnormal  64 Abnormal  100 % -- --   01/06/24 0015 -- 102 27 Abnormal  -- 119 100 % -- --   01/06/24 0000 97.9 °F (36.6 °C) 92 17 70/44 Abnormal  51 Abnormal  100 % None (Room air) --   01/05/24 2300 -- 110 Abnormal  26 Abnormal  106/78 89 99 % -- --   01/05/24 2200 -- 110 Abnormal  26 Abnormal  108/84 90 99 % -- --   01/05/24 2130 -- 96 18 77/58 Abnormal  63 Abnormal  100 % -- --   01/05/24 2100 -- 108 Abnormal  23 Abnormal  -- -- 100 % -- --   01/05/24 2000 -- 108 Abnormal  23 Abnormal  -- -- 100 % None (Room air) --   01/05/24 1954 -- -- -- -- -- 99 % None (Room air) --   01/05/24 1945 98.3 °F (36.8 °C) 106 Abnormal  18 108/74 92 99 % None (Room air) Lying   01/05/24 1900 98.3 °F (36.8 °C) 109 Abnormal  18 108/74 92 100 % None (Room air) Lying     Pertinent Labs/Diagnostic Test Results:   US right upper quadrant    (Results Pending)         Results from last 7 days   Lab Units 01/06/24  0857 01/06/24  0430  01/06/24  0203 01/05/24 2010 01/05/24  1400 01/05/24  0800 01/04/24  1253   WBC Thousand/uL  --  9.27  --  8.04  --  8.18 7.69   HEMOGLOBIN g/dL 7.9* 9.1* 8.7* 9.2* 9.3* 11.1* 10.5*   HEMATOCRIT % 23.0* 26.3* 25.0* 26.2* 25.8* 31.2* 28.8*   PLATELETS Thousands/uL  --  136*  --  109*  --  121* 117*   NEUTROS ABS Thousands/µL  --   --   --  5.24  --   --  5.54   BANDS PCT %  --  7  --   --   --   --   --          Results from last 7 days   Lab Units 01/06/24  0857 01/06/24  0203 01/05/24 2010 01/05/24  1400 01/05/24  0800   SODIUM mmol/L 138 134* 134* 132* 131*  130*   POTASSIUM mmol/L 3.5 3.5 3.6 3.1* 2.6*  3.1*   CHLORIDE mmol/L 114* 110* 109* 102 96  96   CO2 mmol/L 16* 18* 17* 19* 20*  20*   ANION GAP mmol/L 8 6 8 11 15  14   BUN mg/dL 3* 3* 3* 4* 3*  4*   CREATININE mg/dL 0.34* 0.39* 0.41* 0.41* 0.47*  0.45*   EGFR ml/min/1.73sq m 131 125 123 123 118  119   CALCIUM mg/dL 7.2* 7.9* 8.2* 7.8* 8.0*  8.2*   MAGNESIUM mg/dL 1.8* 2.6 1.7* 1.9 2.1   PHOSPHORUS mg/dL 3.1 <1.0* <1.0* <1.0*  --      Results from last 7 days   Lab Units 01/06/24  0525 01/06/24  0430 01/05/24 2010 01/05/24  1104 01/05/24  0800 01/04/24  1253   AST U/L  --  69*  70* 78*  --  114* 158*   ALT U/L  --  26  26 29  --  34 38   ALK PHOS U/L  --  218*  218* 222*  --  266* 253*   TOTAL PROTEIN g/dL  --  5.8*  5.7* 6.0*  --  7.2 7.0   ALBUMIN g/dL  --  2.9*  2.9* 3.1*  --  3.6 3.6   TOTAL BILIRUBIN mg/dL  --  4.50*  4.50* 4.37*  --  4.80* 3.67*   BILIRUBIN DIRECT mg/dL  --  2.78*  2.79*  --   --   --   --    AMMONIA umol/L 99*  --   --  178*  --   --          Results from last 7 days   Lab Units 01/06/24  0857 01/06/24  0203 01/05/24 2010 01/05/24  1400 01/05/24  0800 01/05/24  0107 01/04/24  1434 01/04/24  1253   GLUCOSE RANDOM mg/dL 93 116 103 117 131  133 97 99 103             BETA-HYDROXYBUTYRATE   Date Value Ref Range Status   01/04/2024 2.8 (H) <0.6 mmol/L Final                              Results from last 7 days   Lab  Units 01/06/24  0430 01/05/24 2010 01/05/24  1104   PROTIME seconds 15.5* 15.3* 14.9*   INR  1.21* 1.19 1.14               Results from last 7 days   Lab Units 01/05/24 2010 01/04/24  1253   LIPASE u/L <6* <6*                 Results from last 7 days   Lab Units 01/05/24  1236   CLARITY UA  Slightly Cloudy*   COLOR UA  Antonina*   SPEC GRAV UA  1.010   PH UA  8.0   GLUCOSE UA mg/dl Negative   KETONES UA mg/dl Negative   BLOOD UA  Negative   PROTEIN UA mg/dl Negative   NITRITE UA  Negative   BILIRUBIN UA  Negative   UROBILINOGEN UA mg/dL 1.0   LEUKOCYTES UA  25.0*   WBC UA /hpf 0-1   RBC UA /hpf None Seen   BACTERIA UA /hpf Moderate*   EPITHELIAL CELLS WET PREP /hpf None Seen                 Results from last 7 days   Lab Units 01/04/24  1253   ETHANOL LVL mg/dL 80*                    History reviewed. No pertinent past medical history.  Present on Admission:   Alcohol withdrawal with complication with inpatient treatment (HCC)   Hyponatremia   Hypokalemia   Hepatic steatosis   Hepatic encephalopathy (HCC)   Mild protein-calorie malnutrition (HCC)   Hematemesis   Alcoholic ketoacidosis   Osteopenia   Hypophosphatemia      Admitting Diagnosis: Alcohol withdrawal syndrome (HCC) [F10.939]  Age/Sex: 48 y.o. female  Admission Orders:  Scheduled Medications:  cefTRIAXone, 1,000 mg, Intravenous, Q24H  chlorhexidine, 15 mL, Mouth/Throat, Q12H VARGAS  enoxaparin, 40 mg, Subcutaneous, Daily  folic acid 1 mg in sodium chloride 0.9 % 50 mL IVPB, 1 mg, Intravenous, Daily  multivitamin-minerals, 1 tablet, Oral, Daily  nicotine, 14 mg, Transdermal, Daily  pantoprazole, 40 mg, Intravenous, Q12H VARGAS  potassium phosphate, 30 mmol, Intravenous, Once  thiamine, 500 mg, Intravenous, Q8H VARGAS      Continuous IV Infusions:  lactated ringers, 75 mL/hr, Intravenous, Continuous  octreotide, 25 mcg/hr, Intravenous, Continuous      PRN Meds:  ondansetron, 4 mg, Intravenous, Q6H PRN        IP CONSULT TO CASE MANAGEMENT  IP CONSULT TO CASE  MANAGEMENT  IP CONSULT TO GASTROENTEROLOGY    Network Utilization Review Department  ATTENTION: Please call with any questions or concerns to [phone] and carefully listen to the prompts so that you are directed to the right person. All voicemails are confidential.   For Discharge needs, contact Care Management DC Support Team at [phone] opt. [phone]  Send all requests for admission clinical reviews, approved or denied determinations and any other requests to dedicated fax number below belonging to the campus where the patient is receiving treatment. List of dedicated fax numbers for the Facilities:  FACILITY NAME UR FAX NUMBER   ADMISSION DENIALS (Administrative/Medical Necessity) [phone]   DISCHARGE SUPPORT TEAM (NETWORK) [phone]   PARENT CHILD HEALTH (Maternity/NICU/Pediatrics) [phone]   [facility] [phone]   [facility] [phone]   [facility] [phone]   [facility] [phone]   [facility] [phone]   [facility] [phone]   [facility] [phone]   [facility] [phone]   [facility] [phone]   [facility] [phone]   [facility] [phone]

## 2024-01-06 NOTE — ASSESSMENT & PLAN NOTE
"1/5/2024 CT abd/pel: hepatomegaly with fatty infiltration.  Per radiology read: \" Recannulized umbilical vein and multiple paraesophageal and upper abdominal varices consistent with portal hypertension.\"  Tbili prior to transfer 4.8    Plan:  Check MELD labs now and daily  N.p.o. for now  Serial abdominal exams  RUQ US.  "

## 2024-01-06 NOTE — CONSULTS
CONSULT: GASTROENTEROLOGY        Inpatient consult to gastroenterology     Date/Time  1/6/2024 11:15 AM     Performed by  Fredy Son MD   Authorized by  CARLOS Saldana           PATIENT INFORMATION      Leslye Holland 48 y.o. female MRN: [id number]  Unit/Bed#: ICU 06 Encounter: [id number]  PCP: No primary care provider on file.  Date of Admission:  1/5/2024  Date of Consultation: 01/06/24  Requesting Physician: Usha Gonzales, DO    ASSESSMENTS & PLAN   Leslye Holland is a 48 y.o. old female with past medical history including but not limited to alcohol abuse, alcohol withdrawal, UC currently not on any maintenance therapy, prior Lori-Tavera tear, was transferred from [facility] for concern of hematemesis.    Gastroenterology team has been consulted for assistance with management of hematemesis.     Hematemesis  Acute anemia  She has reportedly had hematemesis overnight when at the detoxification center as evident by blood crusting around her mouth however her story is not consistent.  Hemoglobin on presentation was around 10.5 which is down trended to 9.2 last night 7.9 this morning.  Blood pressures are soft, borderline tachycardia noted.BUN/creatinine ratio is normal.  She apparently has had similar admissions in the past as well most recently in January 2023 when she underwent EGD which showed erosive gastritis with healing ulcers, evidence of portal hypertensive gastropathy without bleeding, LA grade B esophagitis with Lori-Tavera tear, questionable short segment Bill's esophagus, 2 cm hiatal hernia.  She was recommended repeat EGD in 8 to 12 weeks however this has never been performed.    Alcoholic hepatitis  Portal hypertension  Her transaminases on presentation , ALT 38, , albumin 3.6, T. bili 3.6.  INR 1.1, PT 15.  Platelets 199.  On cross-sectional imaging as well as RUQ ultrasound there is evidence of hepatomegaly and severe  hepatic steatosis with paraesophageal varices concerning for portal hypertension.  Her MDF score at present is less than 32.  Cirrhotic morphology is not appreciated however given her current presentation, thrombocytopenia, evidence of portal hypertension we cannot successfully rule out cirrhosis based on imaging findings.  If we do presume that she has cirrhosis her MELD score is 17.  I am unable to find any chronic liver injury workup for her.     Alcohol withdrawals  Management as per ICU team.     Ulcerative colitis  She reportedly has a history of left-sided ulcerative colitis previously on Lialda however currently not on any maintenance therapy.  She is unable to answer any of my questions regarding the same however chart review her last colonoscopy was in 2022 which showed normal pancolonic and terminal ileum mucosa without any biopsies.  Her last CT abdomen on 1/4/2024 did not show any colon wall thickening.    Continue PPI IV twice daily, add ceftriaxone, add octreotide drip.  Active type and screen, 2 large-bore IVs, transfusion for hemoglobin less than < 7, recheck Hb later today or with witnessed bleeding.  Differential for bleeding and acute anemia is Lori-Tavera tear, peptic ulcer disease, portal hypertensive gastropathy, AVM disease, Dieulefoy lesion, varices although given no further witnessed bleeding it is less likely.  If she has any more with this bleeding we will plan for emergent EGD. Otherwise we will plan for EGD after treatment of her alcohol withdrawals and when overall stable on floors prior to discharge.  I will add workup for chronic liver injury.  Outpatient GI follow up.    D/w ICU team. She refused for me to call anyone in her family.     HISTORY OF PRESENT ILLNESS      Leslye Holland is a 48 y.o. female who is originally admitted for concern for hematemesis on 1/5/2024. GI team is consulted for the same.    48-year-old female with history including but not limited to alcohol use  disorder, alcoholic ketoacidosis, chronic pancreatitis, ulcerative colitis diagnosed at pediatric age, history of endovascular, history of cholecystectomy who initially presented to [facility] detoxification center at [place] on insistence of family for alcohol withdrawal.  On presentation she was tachycardic but otherwise hemodynamically stable.  On presentation WBC 7.6, hemoglobin 10.5, platelets 197, 10/80, lipase negative, , , T. bili 3.6, potassium 2.4, beta hydroxybutyrate 2.8.  She was subsequently admitted at detoxification center for management of alcohol withdrawal.  Overnight she was noted to have some blood crusting around her mouth as well as on the bed raising concern for GI bleeding.  She was unable to completely confirm if she had hematemesis or rectal bleeding however she did have significant blood crusting around her mouth.  In the morning GI team was consulted, there was a plan for endoscopy however given significant electrolyte abnormalities it was deferred for the time being.  Later in the day she was noted to have new onset hypotension with slight drop in hemoglobin from 11.1-9.3.  Given this she was transferred to [facility] for stepdown 1 service.  Since presentation to the ICU she has not had any further episodes of hematemesis or stool output.  On my encounter today she is unable to confirm or negate any of my questions.  She only answers yes or no without providing any additional information and her answers are not consistent.  She has been borderline hypotensive with last blood pressure reading 86/68, heart rate 90s, on room air, afebrile.      REVIEW OF SYSTEMS     A thorough 12-point review of systems has been conducted. Pertinent positives and negatives are mentioned in the history of present illness.     PAST MEDICAL & SURGICAL HISTORY      History reviewed. No pertinent past medical history.    Past Surgical History:   Procedure Laterality Date    KNEE  "SURGERY         MEDICATIONS & ALLERGIES       Medications:   Prior to Admission medications    Not on File       Allergies:   Allergies   Allergen Reactions    Acetaminophen Other (See Comments)     Other reaction(s): LIVER ISSUES    Aspirin GI Intolerance    Codeine Hives    Cyclobenzaprine Hives    Ketorolac Tromethamine Hives    Lamotrigine Other (See Comments)     lower extremity pain    Methocarbamol Hives    Nsaids Other (See Comments)     GI upset:Toradol=allergy    Tramadol Hives     Other reaction(s): rash       SOCIAL HISTORY      Substance Use History:   Social History     Substance and Sexual Activity   Alcohol Use Yes    Comment: Fifth of vodka and 12 tall boys     Social History     Tobacco Use   Smoking Status Every Day    Current packs/day: 0.25    Average packs/day: 0.3 packs/day for 6.0 years (1.5 ttl pk-yrs)    Types: Cigarettes    Start date: 1/1/2018   Smokeless Tobacco Never     Social History     Substance and Sexual Activity   Drug Use Never       FAMILY HISTORY      As in the HPI.     PHYSICAL EXAM     Vitals:   Blood Pressure: (!) 86/68 (01/06/24 0815)  Pulse: 90 (01/06/24 0815)  Temperature: 98 °F (36.7 °C) (01/06/24 0725)  Temp Source: Axillary (01/06/24 0725)  Respirations: 18 (01/06/24 0815)  Height: 5' 2\" (157.5 cm) (01/05/24 1945)  Weight - Scale: 43.9 kg (96 lb 12.5 oz) (01/06/24 0533)  SpO2: 100 % (01/06/24 0815)    Physical Exam:   GENERAL: sleepy  HEENT:  Atraumatic, blood crust noted around mouth  CARDIAC:  RRR on palpation  PULMONARY:  non-labored breathing  ABDOMEN: non tender  RECTAL:  deferred  NEUROLOGIC:  does not provide answers consistently, sleepy  EXTREMITIES: chronic changes  SKIN:  chronic changes    ADDITIONAL DATA     Lab Results:     Results from last 7 days   Lab Units 01/06/24  0857 01/06/24  0430 01/06/24  0203 01/05/24 2010   WBC Thousand/uL  --  9.27  --  8.04   HEMOGLOBIN g/dL 7.9* 9.1*   < > 9.2*   HEMATOCRIT % 23.0* 26.3*   < > 26.2*   PLATELETS " Thousands/uL  --  136*  --  109*   NEUTROS PCT %  --   --   --  66   LYMPHS PCT %  --   --   --  21   MONOS PCT %  --   --   --  12   EOS PCT %  --   --   --  0    < > = values in this interval not displayed.     Results from last 7 days   Lab Units 01/06/24  0857 01/06/24  0430   POTASSIUM mmol/L 3.5  --    CHLORIDE mmol/L 114*  --    CO2 mmol/L 16*  --    BUN mg/dL 3*  --    CREATININE mg/dL 0.34*  --    CALCIUM mg/dL 7.2*  --    ALK PHOS U/L  --  218*  218*   ALT U/L  --  26  26   AST U/L  --  69*  70*     Results from last 7 days   Lab Units 01/06/24  0430   INR  1.21*       Imaging:    US right upper quadrant    Result Date: 1/5/2024  Narrative: RIGHT UPPER QUADRANT ULTRASOUND INDICATION:     Concern for cirrhosis. COMPARISON: CT abdomen pelvis 1/4/2024 TECHNIQUE:   Real-time ultrasound of the right upper quadrant was performed with a curvilinear transducer with both volumetric sweeps and still imaging techniques. FINDINGS: PANCREAS:  Visualized portions of the pancreas are within normal limits. AORTA AND IVC:  Visualized portions are normal for patient age. LIVER: Size:  Mildly enlarged.  The liver measures 18.4 cm in the midclavicular line. Contour:  Surface contour is smooth. Parenchyma: Mildly coarsened echotexture. There is marked diffuse increased echogenicity with significant beam attenuation with loss of periportal echogenicity.  Most consistent with severe hepatic steatosis. No liver mass identified. Limited imaging of the main portal vein shows it to be patent with flow in the appropriate direction and PSV 31 cm/s. BILIARY: Patient has undergone cholecystectomy. No intrahepatic biliary dilatation. CBD measures 8.0 mm. No choledocholithiasis. KIDNEY: Right kidney measures 11.7 x 5.9 x 6.4 cm. Volume 230.9 mL Kidney within normal limits. ASCITES:   None.     Impression: Hepatomegaly and severe hepatic steatosis. Coexisting fibrotic or inflammatory changes not excluded. Resident: Jose Saunders I, the  attending radiologist, have reviewed the images and agree with the final report above. Workstation performed: [id number]     CT head wo contrast    Result Date: 1/5/2024  Narrative: CT BRAIN - WITHOUT CONTRAST INDICATION:   acute encephelopathy. COMPARISON:  None. TECHNIQUE:  CT examination of the brain was performed.  Multiplanar 2D reformatted images were created from the source data. Radiation dose length product (DLP) for this visit:  756 mGy-cm .  This examination, like all CT scans performed in the [facility], was performed utilizing techniques to minimize radiation dose exposure, including the use of iterative reconstruction and automated exposure control. IMAGE QUALITY:  Diagnostic. FINDINGS: PARENCHYMA:  No intracranial mass, mass effect or midline shift. No CT signs of acute infarction.  No acute parenchymal hemorrhage. VENTRICLES AND EXTRA-AXIAL SPACES:  Normal for the patient's age. VISUALIZED ORBITS: Normal visualized orbits. PARANASAL SINUSES: Normal visualized paranasal sinuses. CALVARIUM AND EXTRACRANIAL SOFT TISSUES:  Normal.     Impression: No acute intracranial abnormality. Workstation performed: [id number]     CT abdomen pelvis with contrast    Result Date: 1/4/2024  Narrative: CT ABDOMEN AND PELVIS WITH IV CONTRAST INDICATION:   upper abd pain. COMPARISON: CT abdomen/pelvis dated 8/7/2007. TECHNIQUE:  CT examination of the abdomen and pelvis was performed. Multiplanar 2D reformatted images were created from the source data. This examination, like all CT scans performed in the [facility], was performed utilizing techniques to minimize radiation dose exposure, including the use of iterative reconstruction and automated exposure control. Radiation dose length product (DLP) for this visit:  216 mGy-cm IV Contrast:  75 mL of iohexol (OMNIPAQUE) Enteric Contrast:  Enteric contrast was not administered. FINDINGS: ABDOMEN LOWER CHEST:  No clinically significant  abnormality identified in the visualized lower chest. LIVER/BILIARY TREE: The liver is markedly enlarged and demonstrates severe fatty infiltration with sparing adjacent to the gallbladder fossa. Recanalized umbilical vein and multiple paraesophageal and upper abdominal varices consistent with portal hypertension. GALLBLADDER: Gallbladder is surgically absent. SPLEEN:  Unremarkable. PANCREAS:  Unremarkable. ADRENAL GLANDS:  Unremarkable. KIDNEYS/URETERS:  Unremarkable. No hydronephrosis. STOMACH AND BOWEL: No evidence for bowel obstruction. Colonic diverticulosis without evidence for acute diverticulitis. APPENDIX:  No findings to suggest appendicitis. ABDOMINOPELVIC CAVITY:  No ascites.  No pneumoperitoneum.  No lymphadenopathy. Mildly prominent periportal lymph nodes are likely reactive. VESSELS:  Unremarkable for patient's age. PELVIS REPRODUCTIVE ORGANS: Surgical changes of prior hysterectomy. URINARY BLADDER:  Unremarkable. ABDOMINAL WALL/INGUINAL REGIONS: Small fat-containing umbilical hernia. OSSEOUS STRUCTURES:  No acute fracture or destructive osseous lesion. Diffuse osteopenia, advanced for stated age.     Impression: Marked hepatomegaly with severe fatty infiltration. Portal hypertension. Uncomplicated colonic diverticulosis. Diffuse osteopenia, advanced for stated age. Workstation performed: [id number]       EKG, Pathology, and Other Studies Reviewed on Admission:   EKG: Reviewed    Counseling / Coordination of Care Time: 30 total mins spent n consult. Greater than 50% of total time spent on patient counseling and coordination of care.    Fredy Son MD   PGY-4, Department of Gastroenterology.  ...............................................................................................................................................  ** Please Note: This note is constructed using a voice recognition dictation system. **

## 2024-01-06 NOTE — ASSESSMENT & PLAN NOTE
Serum potassium prior to transfer 3.1, was treated with potassium supplementation at [facility] detox unit  Likely in the setting of beer potomania    Plan:  Check BMP, mag, Phos now  Replete as needed to maintain goal K > 4, Mag > 2, Phos > 3  Monitor UO and renal indices  EKG daily

## 2024-01-06 NOTE — ASSESSMENT & PLAN NOTE
2/2 EtOH abuse and poor nutrition  Serum phos prior to transfer < 1, was receiving replacement at [facility] detox unit    Plan:  Check phos now and q6h  Replace as needed for goal phos > 3

## 2024-01-06 NOTE — ASSESSMENT & PLAN NOTE
Incidentally noted on CT from 1/5/2024.  Per radiology report, this is advanced for stated age  Will need oral calcium supplementation prior to discharge  Fall precautions, patient at high risk for fracture.

## 2024-01-06 NOTE — ASSESSMENT & PLAN NOTE
"While at [facility] Detox unit she had an episode of hematemesis  Hgb prior to transfer 11.1 > 9.3 > 9.2  She was started on PPI BID prior to transfer  Likely has a degree of baseline anemia in the setting of EtOH abuse and poor nutrition  Has had EGDs in the past at [facility].  Last EGD from Jan 2023 report with \"erosive gastritis with esophagitis, possible small saad-begum tear\"  She has been transfused in the past     Plan:  Check CBC now  Transfuse as needed for hgb < 7  Continue protonix IV bid dosing   Type and screen   No immediate need for transfusion  Consult GI, appreciate recommendations  Follow hgb q6h  If has further hematemesis tonight may require EGD this evening.   "

## 2024-01-06 NOTE — PLAN OF CARE
Problem: Prexisting or High Potential for Compromised Skin Integrity  Goal: Skin integrity is maintained or improved  Description: INTERVENTIONS:  - Identify patients at risk for skin breakdown  - Assess and monitor skin integrity  - Assess and monitor nutrition and hydration status  - Monitor labs   - Assess for incontinence   - Turn and reposition patient  - Assist with mobility/ambulation  - Relieve pressure over bony prominences  - Avoid friction and shearing  - Provide appropriate hygiene as needed including keeping skin clean and dry  - Evaluate need for skin moisturizer/barrier cream  - Collaborate with interdisciplinary team   - Patient/family teaching  - Consider wound care consult   Outcome: Progressing     Problem: PAIN - ADULT  Goal: Verbalizes/displays adequate comfort level or baseline comfort level  Description: Interventions:  - Encourage patient to monitor pain and request assistance  - Assess pain using appropriate pain scale  - Administer analgesics based on type and severity of pain and evaluate response  - Implement non-pharmacological measures as appropriate and evaluate response  - Consider cultural and social influences on pain and pain management  - Notify physician/advanced practitioner if interventions unsuccessful or patient reports new pain  Outcome: Progressing     Problem: INFECTION - ADULT  Goal: Absence or prevention of progression during hospitalization  Description: INTERVENTIONS:  - Assess and monitor for signs and symptoms of infection  - Monitor lab/diagnostic results  - Monitor all insertion sites, i.e. indwelling lines, tubes, and drains  - Monitor endotracheal if appropriate and nasal secretions for changes in amount and color  - [place] appropriate cooling/warming therapies per order  - Administer medications as ordered  - Instruct and encourage patient and family to use good hand hygiene technique  - Identify and instruct in appropriate isolation precautions for  identified infection/condition  Outcome: Progressing     Problem: SAFETY ADULT  Goal: Patient will remain free of falls  Description: INTERVENTIONS:  - Educate patient/family on patient safety including physical limitations  - Instruct patient to call for assistance with activity   - Consult OT/PT to assist with strengthening/mobility   - Keep Call bell within reach  - Keep bed low and locked with side rails adjusted as appropriate  - Keep care items and personal belongings within reach  - Initiate and maintain comfort rounds  - Make Fall Risk Sign visible to staff  - Apply yellow socks and bracelet for high fall risk patients  - Consider moving patient to room near nurses station  Outcome: Progressing

## 2024-01-06 NOTE — ASSESSMENT & PLAN NOTE
Malnutrition Findings: muscle waisting, decreased body hair.                                BMI Findings:           Body mass index is 17.7 kg/m².     Plan:  N.p.o. for now while having hematemesis  Convert medications to IV as able  Nutrition consult.

## 2024-01-06 NOTE — ASSESSMENT & PLAN NOTE
Serum sodium 132 prior to transfer, for last 24 hours gracie sodium 122  Likely in the setting of alcohol abuse, poor nutrition, beer potomania    Plan:  Obtain BMP now  Avoid overcorrection  Nephrology was consulted, appreciate recommendations  Goal serum sodium 136 by 1 PM 1/6/24

## 2024-01-06 NOTE — CASE MANAGEMENT
Case Management Discharge Planning Note    Patient name Leslye Holland  Location ICU 06/ICU 06 MRN [id number]  : 1975 Date 2024       Current Admission Date: 2024  Current Admission Diagnosis:Hematemesis   Patient Active Problem List    Diagnosis Date Noted    Hepatic encephalopathy (HCC) 2024    Mild protein-calorie malnutrition (HCC) 2024    Alcoholic ketoacidosis 2024    Hematemesis 2024    Osteopenia 2024    Hypophosphatemia 2024    Alcohol use disorder, severe, dependence (HCC) 2024    Alcohol withdrawal with complication with inpatient treatment (HCC) 2024    Hyponatremia 2024    Hypokalemia 2024    Hypomagnesemia 2024    Hepatic steatosis 2024      LOS (days): 1  Geometric Mean LOS (GMLOS) (days):   Days to GMLOS:     OBJECTIVE:  Risk of Unplanned Readmission Score: 18.35         Current admission status: Inpatient   Preferred Pharmacy:   [address] [address]  Phone: [phone] Fax: [phone]    Primary Care Provider: No primary care provider on file.    Primary Insurance: [facility]  Secondary Insurance:     DISCHARGE DETAILS:                                                                                                 Additional Comments: Pt transfer from  detox for alcohol withdrawal. GI consulted for hematemesis. Pt is encephalopathic. Will offer HOST when appropriate. Continuing to follow.

## 2024-01-06 NOTE — ASSESSMENT & PLAN NOTE
Incidentally noted on CT from 1/5/2024.  Per radiology report, this is advanced for stated age  Will need oral calcium supplementation prior to discharge  Fall precautions, patient at high risk for fracture

## 2024-01-06 NOTE — ASSESSMENT & PLAN NOTE
"1/5/2024 CT abd/pel: hepatomegaly with fatty infiltration.  Per radiology read: \" Recannulized umbilical vein and multiple paraesophageal and upper abdominal varices consistent with portal hypertension.\"  Tbili prior to transfer 4.8    Plan:  Check MELD labs now and daily  Serial abdominal exams  RUQ US  "

## 2024-01-06 NOTE — ASSESSMENT & PLAN NOTE
Was transferred from [facility] Detox unit 2/2 hematemesis.  While in the Detox unit she received Valium 5 mg, and phenobarbital 845 mg.    Plan:  CIWA scoring, prefer to give phenobarbital over benzos  High dose thiamine  mg q8h x3 days, then thiamine  mg daily x3 days, then thiamine 100 mg daily  Npo for now 2/2 hematemesis  Fall precautions  Seizure precautions  Optimize electrolytes

## 2024-01-06 NOTE — ASSESSMENT & PLAN NOTE
2/2 EtOH cirrhosis  NH3 prior to transfer 178  Alert to person only on exam, currently protecting airway  Monitor mental status    Plan:  Start lactulose when able to take PO, may need to consider retention enemas if unable to take PO  Check NH3 in am  Serial neuro exams  High dose thiamine  mg q8h x3 days, then thiamine  mg daily x3 days, then thiamine 100 mg daily.

## 2024-01-06 NOTE — ASSESSMENT & PLAN NOTE
[facility] 2[facility]2 hematemesis.  While in the Detox unit she received Valium 5 mg, and phenobarbital 845 mg.  Overnight required phenobarbital 130 mg x 1    Plan:  SON gee, prefer to give phenobarbital over benzos  High dose thiamine  mg q8h x3 days, then thiamine  mg daily x3 days, then thiamine 100 mg daily  Npo for now 2/2 hematemesis  Fall precautions  Seizure precautions  Optimize electrolytes

## 2024-01-06 NOTE — PROGRESS NOTES
"[facility]  Progress Note  Name: Leslye Holland I  MRN: [id number]  Unit/Bed#: ICU 06 I Date of Admission: 1/5/2024   Date of Service: 1/6/2024 I Hospital Day: 1    Assessment/Plan   * Hematemesis  Assessment & Plan  While at [facility] Detox unit she had an episode of hematemesis  Hgb prior to transfer 11.1 > 9.3 > 9.2  She was started on PPI BID prior to transfer  Likely has a degree of baseline anemia in the setting of EtOH abuse and poor nutrition  Has had EGDs in the past at [facility].  Last EGD from Jan 2023 report with \"erosive gastritis with esophagitis, possible small saad-begum tear\"  She has been transfused in the past   Most recent hemoglobin 8.7    Plan:  Check CBC now  Transfuse as needed for hgb < 7  Continue protonix IV bid dosing   Type and screen   No immediate need for transfusion  Consult GI, appreciate recommendations  Follow hgb q6h  If has further hematemesis tonight may require EGD this evening.     Alcohol withdrawal with complication with inpatient treatment (HCC)  Assessment & Plan  Was transferred from [facility] unit 2/2 hematemesis.  While in the Detox unit she received Valium 5 mg, and phenobarbital 845 mg.  Overnight required phenobarbital 130 mg x 1    Plan:  CIWA scoring, prefer to give phenobarbital over benzos  High dose thiamine  mg q8h x3 days, then thiamine  mg daily x3 days, then thiamine 100 mg daily  Npo for now 2/2 hematemesis  Fall precautions  Seizure precautions  Optimize electrolytes    Hepatic encephalopathy (HCC)  Assessment & Plan  2/2 EtOH cirrhosis  NH3 prior to transfer 178  Alert to person only on exam, currently protecting airway  Monitor mental status    Plan:  Start lactulose when able to take PO, may need to consider retention enemas if unable to take PO  Check NH3 in am  Serial neuro exams  High dose thiamine  mg q8h x3 days, then thiamine  mg daily x3 days, then thiamine 100 mg daily.    Alcoholic " "ketoacidosis  Assessment & Plan  In the setting of EtOH abuse and poor solute intake  Most recent BMP prior to transfer with Anion gap 11, though was 15 prior.  Continues to have an acidosis, though is not gapped    Plan:  Check BMP now  Will need to be cautious with IVFs as we want to avoid over correction of serum Na  NPO for now 2/2 recent hematemesis  Thiamine and folic acid as above    Hypokalemia  Assessment & Plan  Serum potassium prior to transfer 3.1, was treated with potassium supplementation at [facility] detox unit  Likely in the setting of beer potomania    Plan:  Check BMP, mag, Phos now  Replete as needed to maintain goal K > 4, Mag > 2, Phos > 3  Monitor UO and renal indices  EKG daily    Hyponatremia  Assessment & Plan  Serum sodium 132 prior to transfer, for last 24 hours gracie sodium 122  Likely in the setting of alcohol abuse, poor nutrition, beer potomania  Most recent serum sodium 134, peers dehydrated    Plan:  Continue serial BMP every 6 hours  Avoid overcorrection  Nephrology was consulted, appreciate recommendations  Goal serum sodium 136 by 1 PM 1/6/24   Start LR at 75 mL/h      Hypophosphatemia  Assessment & Plan  2/2 EtOH abuse and poor nutrition  Serum phos prior to transfer < 1, was receiving replacement at [facility] detox unit    Plan:  Check phos now and q6h  Replace as needed for goal phos > 3.    Hepatic steatosis  Assessment & Plan  1/5/2024 CT abd/pel: hepatomegaly with fatty infiltration.  Per radiology read: \" Recannulized umbilical vein and multiple paraesophageal and upper abdominal varices consistent with portal hypertension.\"  Tbili prior to transfer 4.8    Plan:  Check MELD labs now and daily  N.p.o. for now  Serial abdominal exams  RUQ US.    Osteopenia  Assessment & Plan  Incidentally noted on CT from 1/5/2024.  Per radiology report, this is advanced for stated age  Will need oral calcium supplementation prior to discharge  Fall precautions, patient at high risk for " fracture    Mild protein-calorie malnutrition (HCC)  Assessment & Plan  Malnutrition Findings: muscle waisting, decreased body hair.                                BMI Findings:           Body mass index is 17.7 kg/m².     Plan:  N.p.o. for now while having hematemesis  Convert medications to IV as able  Nutrition consult.                 Disposition: Stepdown Level 1    ICU Core Measures     A: Assess, Prevent, and Manage Pain Has pain been assessed? Yes  Need for changes to pain regimen? No   B: Both SAT/SAT  N/A   C: Choice of Sedation RASS Goal: N/A patient not on sedation  Need for changes to sedation or analgesia regimen? No   D: Delirium CAM-ICU: Positive   E: Early Mobility  Plan for early mobility? Yes   F: Family Engagement Plan for family engagement today? Yes         Prophylaxis:  VTE VTE covered by:  enoxaparin, Subcutaneous       Stress Ulcer  covered bypantoprazole (PROTONIX) injection 40 mg [[id number]]         Significant 24hr Events     24hr events:   Patient transferred from [facility] inpatient medical detox unit to [facility] ICU for hematemesis.  Yesterday hemoglobin prior to transfer 11.1 then 9.3, overnight hemoglobin drifted down to 8.7.  Hypotension overnight, received albumin, started on gentle IV fluids with LR  Serial BMP, mag, Phos with electrolyte repletion, avoid overcorrection of serum sodium.  Goal serum sodium by 1 PM this afternoon is 136.     Subjective   Review of Systems   Unable to perform ROS: Other (Hepatic encephalopathy)      Objective                            Vitals I/O      Most Recent Min/Max in 24hrs   Temp 97.9 °F (36.6 °C) Temp  Min: 97.5 °F (36.4 °C)  Max: 98.3 °F (36.8 °C)   Pulse 96 Pulse  Min: 88  Max: 110   Resp 21 Resp  Min: 17  Max: 27   BP (!) 80/59 BP  Min: 70/44  Max: 134/89   O2 Sat 100 % SpO2  Min: 99 %  Max: 100 %    No intake or output data in the 24 hours ending 01/06/24 9672    Diet NPO; Sips with meds    Invasive Monitoring           Physical Exam   Physical  Exam  Vitals and nursing note reviewed.   Eyes:      General: Vision grossly intact. NeglectScleral icterus present.         Right eye: No discharge.         Left eye: No discharge.      Extraocular Movements: Extraocular movements intact.      Conjunctiva/sclera: Conjunctivae normal.      Pupils: Pupils are equal, round, and reactive to light.   Skin:     General: Skin is warm, dry and not mottled extremities.      Capillary Refill: Capillary refill takes less than 2 seconds.      Coloration: Skin is pale. Skin is not jaundiced.      Findings: No lesion, rash or wound.   HENT:      Head: Normocephalic and atraumatic.      Right Ear: Hearing normal. No drainage.      Left Ear: Hearing normal. No drainage.      Nose: No nasal deformity, nasal tenderness, congestion, rhinorrhea, epistaxis or nasogastric tube.      Mouth/Throat:      Mouth: Mucous membranes are dry. No injury or angioedema.      Dentition: Abnormal dentition.      Pharynx: No oropharyngeal exudate.      Comments: Halitosis  Neck:      Vascular: No JVD.   no midline tenderness Cardiovascular:      Rate and Rhythm: Normal rate and regular rhythm.      Pulses: Decreased pulses.           Radial pulses are 1+ on the right side and 1+ on the left side.        Dorsalis pedis pulses are 1+ on the right side and 1+ on the left side.      Heart sounds: No murmur heard.     No friction rub. No gallop.   Musculoskeletal:         General: No swelling, tenderness, deformity, signs of injury or amputation. Normal range of motion.      Right lower leg: No edema.      Left lower leg: No edema.   Abdominal: General: Bowel sounds are normal. There is no distension.      Palpations: Abdomen is soft.      Tenderness: There is abdominal tenderness. There is no guarding.      Hernia: No hernia is present.   Constitutional:       General: She is not in acute distress.     Appearance: She is ill-appearing. She is not toxic-appearing.      Interventions: She is not sedated,  not intubated and not restrained.  Pulmonary:      Effort: Tachypnea present. No accessory muscle usage, respiratory distress or accessory muscle usage. She is not intubated.      Breath sounds: Normal breath sounds.   Psychiatric:         Mood and Affect:  mood and affect abnormal.        Speech: Speech is not no receptive aphasia or no expressive aphasia.   Neurological:      Mental Status: She is alert. She is CAM ICU positive , disoriented to place, disoriented to time and disoriented to situation.      Cranial Nerves: No dysarthria or facial asymmetry.      Sensory: No sensory deficit.      Motor: Strength full and intact in all extremities. No pronator drift or motor deficit.            Diagnostic Studies      EKG: Sinus rhythm  Imaging: No new imaging I have personally reviewed pertinent reports.   and I have personally reviewed pertinent films in PACS     Medications:  Scheduled PRN   Albumin 5%, 12.5 g, Once  chlorhexidine, 15 mL, Q12H VARGAS  enoxaparin, 40 mg, Daily  folic acid, 1 mg, Daily  multivitamin-minerals, 1 tablet, Daily  nicotine, 14 mg, Daily  pantoprazole, 40 mg, Q12H VARGAS  potassium phosphate, 30 mmol, Once  potassium phosphate, 30 mmol, Once  thiamine, 500 mg, Q8H VARGAS      ondansetron, 4 mg, Q6H PRN       Continuous    lactated ringers, 75 mL/hr         Labs:    CBC    Recent Labs     01/05/24  0800 01/05/24  1400 01/05/24 2010 01/06/24  0203   WBC 8.18  --  8.04  --    HGB 11.1*   < > 9.2* 8.7*   HCT 31.2*   < > 26.2* 25.0*   *  --  109*  --     < > = values in this interval not displayed.     BMP    Recent Labs     01/05/24 2010 01/06/24  0203   SODIUM 134* 134*   K 3.6 3.5   * 110*   CO2 17* 18*   AGAP 8 6   BUN 3* 3*   CREATININE 0.41* 0.39*   CALCIUM 8.2* 7.9*       Coags    Recent Labs     01/05/24  1104 01/05/24 2010   INR 1.14 1.19        Additional Electrolytes  Recent Labs     01/05/24 2010 01/06/24  0203   MG 1.7* 2.6   PHOS <1.0* <1.0*          Blood Gas    No recent  results  No recent results LFTs  Recent Labs     01/05/24  0800 01/05/24 2010   ALT 34 29   * 78*   ALKPHOS 266* 222*   ALB 3.6 3.1*   TBILI 4.80* 4.37*       Infectious  No recent results  Glucose  Recent Labs     01/05/24  0800 01/05/24  1400 01/05/24 2010 01/06/24  0203   GLUC 131  133 117 103 116               Non-Critical Care Time Statement: I have spent a total time of 30 minutes in caring for this patient including Reviewing / ordering tests, medicine, procedures  .     CARLOS Saldana

## 2024-01-06 NOTE — ASSESSMENT & PLAN NOTE
2/2 EtOH cirrhosis  NH3 prior to transfer 178  Alert to person only on exam, currently protecting airway  Monitor mental status    Plan:  Start lactulose when able to take PO, may need to consider retention enemas if unable to take PO  Check NH3 in am  Serial neuro exams  High dose thiamine  mg q8h x3 days, then thiamine  mg daily x3 days, then thiamine 100 mg daily

## 2024-01-06 NOTE — ASSESSMENT & PLAN NOTE
Serum potassium prior to transfer 3.1, was treated with potassium supplementation at [facility] detox unit  Likely in the setting of beer potomania    Plan:  Check BMP, mag, Phos now  Replete as needed to maintain goal K > 4, Mag > 2, Phos > 3  Monitor UO and renal indices  EKG now

## 2024-01-07 PROBLEM — K70.10 ALCOHOLIC HEPATITIS: Status: ACTIVE | Noted: 2024-01-07

## 2024-01-07 LAB
A1AT SERPL-MCNC: 136 MG/DL (ref 101–187)
ALBUMIN SERPL BCP-MCNC: 2.7 G/DL (ref 3.5–5)
ALP SERPL-CCNC: 148 U/L (ref 34–104)
ALT SERPL W P-5'-P-CCNC: 20 U/L (ref 7–52)
AMMONIA PLAS-SCNC: 186 UMOL/L (ref 18–72)
ANION GAP SERPL CALCULATED.3IONS-SCNC: 9 MMOL/L
ANION GAP SERPL CALCULATED.3IONS-SCNC: 9 MMOL/L
AST SERPL W P-5'-P-CCNC: 58 U/L (ref 13–39)
ATRIAL RATE: 91 BPM
BACTERIA UR CULT: ABNORMAL
BACTERIA UR CULT: ABNORMAL
BILIRUB SERPL-MCNC: 2.74 MG/DL (ref 0.2–1)
BUN SERPL-MCNC: 3 MG/DL (ref 5–25)
BUN SERPL-MCNC: 3 MG/DL (ref 5–25)
CALCIUM ALBUM COR SERPL-MCNC: 8.4 MG/DL (ref 8.3–10.1)
CALCIUM SERPL-MCNC: 7.4 MG/DL (ref 8.4–10.2)
CALCIUM SERPL-MCNC: 7.4 MG/DL (ref 8.4–10.2)
CERULOPLASMIN SERPL-MCNC: 18.3 MG/DL (ref 19–39)
CHLORIDE SERPL-SCNC: 112 MMOL/L (ref 96–108)
CHLORIDE SERPL-SCNC: 112 MMOL/L (ref 96–108)
CO2 SERPL-SCNC: 15 MMOL/L (ref 21–32)
CO2 SERPL-SCNC: 16 MMOL/L (ref 21–32)
CREAT SERPL-MCNC: 0.46 MG/DL (ref 0.6–1.3)
CREAT SERPL-MCNC: 0.49 MG/DL (ref 0.6–1.3)
ERYTHROCYTE [DISTWIDTH] IN BLOOD BY AUTOMATED COUNT: 14.1 % (ref 11.6–15.1)
GFR SERPL CREATININE-BSD FRML MDRD: 116 ML/MIN/1.73SQ M
GFR SERPL CREATININE-BSD FRML MDRD: 118 ML/MIN/1.73SQ M
GLUCOSE SERPL-MCNC: 160 MG/DL (ref 65–140)
GLUCOSE SERPL-MCNC: 162 MG/DL (ref 65–140)
HCT VFR BLD AUTO: 23 % (ref 34.8–46.1)
HCT VFR BLD AUTO: 24.4 % (ref 34.8–46.1)
HGB BLD-MCNC: 7.7 G/DL (ref 11.5–15.4)
HGB BLD-MCNC: 8.1 G/DL (ref 11.5–15.4)
INR PPP: 1.27 (ref 0.84–1.19)
MAGNESIUM SERPL-MCNC: 1.4 MG/DL (ref 1.9–2.7)
MCH RBC QN AUTO: 32 PG (ref 26.8–34.3)
MCHC RBC AUTO-ENTMCNC: 33.5 G/DL (ref 31.4–37.4)
MCV RBC AUTO: 95 FL (ref 82–98)
P AXIS: 16 DEGREES
PHOSPHATE SERPL-MCNC: 1.9 MG/DL (ref 2.7–4.5)
PLATELET # BLD AUTO: 116 THOUSANDS/UL (ref 149–390)
PMV BLD AUTO: 10.8 FL (ref 8.9–12.7)
POTASSIUM SERPL-SCNC: 2.9 MMOL/L (ref 3.5–5.3)
POTASSIUM SERPL-SCNC: 3.2 MMOL/L (ref 3.5–5.3)
PR INTERVAL: 164 MS
PROT SERPL-MCNC: 5.2 G/DL (ref 6.4–8.4)
PROTHROMBIN TIME: 16.1 SECONDS (ref 11.6–14.5)
QRS AXIS: 62 DEGREES
QRSD INTERVAL: 76 MS
QT INTERVAL: 358 MS
QTC INTERVAL: 440 MS
RBC # BLD AUTO: 2.41 MILLION/UL (ref 3.81–5.12)
SODIUM SERPL-SCNC: 136 MMOL/L (ref 135–147)
SODIUM SERPL-SCNC: 137 MMOL/L (ref 135–147)
T WAVE AXIS: 26 DEGREES
VENTRICULAR RATE: 91 BPM
WBC # BLD AUTO: 6.41 THOUSAND/UL (ref 4.31–10.16)

## 2024-01-07 PROCEDURE — 83735 ASSAY OF MAGNESIUM: CPT | Performed by: INTERNAL MEDICINE

## 2024-01-07 PROCEDURE — 85018 HEMOGLOBIN: CPT | Performed by: NURSE PRACTITIONER

## 2024-01-07 PROCEDURE — 85610 PROTHROMBIN TIME: CPT | Performed by: INTERNAL MEDICINE

## 2024-01-07 PROCEDURE — 99232 SBSQ HOSP IP/OBS MODERATE 35: CPT | Performed by: INTERNAL MEDICINE

## 2024-01-07 PROCEDURE — 85014 HEMATOCRIT: CPT | Performed by: NURSE PRACTITIONER

## 2024-01-07 PROCEDURE — C9113 INJ PANTOPRAZOLE SODIUM, VIA: HCPCS

## 2024-01-07 PROCEDURE — 84100 ASSAY OF PHOSPHORUS: CPT | Performed by: INTERNAL MEDICINE

## 2024-01-07 PROCEDURE — 93005 ELECTROCARDIOGRAM TRACING: CPT

## 2024-01-07 PROCEDURE — 80053 COMPREHEN METABOLIC PANEL: CPT | Performed by: INTERNAL MEDICINE

## 2024-01-07 PROCEDURE — 80048 BASIC METABOLIC PNL TOTAL CA: CPT

## 2024-01-07 PROCEDURE — 82140 ASSAY OF AMMONIA: CPT | Performed by: INTERNAL MEDICINE

## 2024-01-07 PROCEDURE — 85027 COMPLETE CBC AUTOMATED: CPT | Performed by: INTERNAL MEDICINE

## 2024-01-07 RX ORDER — SODIUM CHLORIDE, SODIUM LACTATE, POTASSIUM CHLORIDE, CALCIUM CHLORIDE 600; 310; 30; 20 MG/100ML; MG/100ML; MG/100ML; MG/100ML
75 INJECTION, SOLUTION INTRAVENOUS CONTINUOUS
Status: DISCONTINUED | OUTPATIENT
Start: 2024-01-07 | End: 2024-01-07

## 2024-01-07 RX ORDER — DEXTROSE MONOHYDRATE, SODIUM CHLORIDE, AND POTASSIUM CHLORIDE 50; 1.49; 9 G/1000ML; G/1000ML; G/1000ML
75 INJECTION, SOLUTION INTRAVENOUS CONTINUOUS
Status: DISCONTINUED | OUTPATIENT
Start: 2024-01-07 | End: 2024-01-11

## 2024-01-07 RX ORDER — LACTULOSE 10 G/15ML
30 SOLUTION ORAL 3 TIMES DAILY
Status: DISCONTINUED | OUTPATIENT
Start: 2024-01-07 | End: 2024-01-11

## 2024-01-07 RX ORDER — FOLIC ACID 1 MG/1
1 TABLET ORAL DAILY
Status: DISCONTINUED | OUTPATIENT
Start: 2024-01-08 | End: 2024-01-20 | Stop reason: HOSPADM

## 2024-01-07 RX ORDER — DIPHENHYDRAMINE HYDROCHLORIDE AND LIDOCAINE HYDROCHLORIDE AND ALUMINUM HYDROXIDE AND MAGNESIUM HYDRO
10 KIT EVERY 4 HOURS PRN
Status: DISCONTINUED | OUTPATIENT
Start: 2024-01-07 | End: 2024-01-11

## 2024-01-07 RX ORDER — MAGNESIUM SULFATE HEPTAHYDRATE 40 MG/ML
4 INJECTION, SOLUTION INTRAVENOUS ONCE
Qty: 100 ML | Refills: 0 | Status: COMPLETED | OUTPATIENT
Start: 2024-01-07 | End: 2024-01-07

## 2024-01-07 RX ADMIN — RIFAXIMIN 550 MG: 550 TABLET ORAL at 11:25

## 2024-01-07 RX ADMIN — LACTULOSE 30 G: 20 SOLUTION ORAL at 15:55

## 2024-01-07 RX ADMIN — Medication 1 TABLET: at 08:48

## 2024-01-07 RX ADMIN — SODIUM CHLORIDE, SODIUM LACTATE, POTASSIUM CHLORIDE, AND CALCIUM CHLORIDE 75 ML/HR: .6; .31; .03; .02 INJECTION, SOLUTION INTRAVENOUS at 04:30

## 2024-01-07 RX ADMIN — DEXTROSE, SODIUM CHLORIDE, AND POTASSIUM CHLORIDE 75 ML/HR: 5; .9; .15 INJECTION INTRAVENOUS at 17:57

## 2024-01-07 RX ADMIN — LACTULOSE 30 G: 20 SOLUTION ORAL at 11:24

## 2024-01-07 RX ADMIN — THIAMINE HYDROCHLORIDE 500 MG: 100 INJECTION, SOLUTION INTRAMUSCULAR; INTRAVENOUS at 06:13

## 2024-01-07 RX ADMIN — CEFTRIAXONE 1000 MG: 1 INJECTION, SOLUTION INTRAVENOUS at 11:24

## 2024-01-07 RX ADMIN — FOLIC ACID 1 MG: 5 INJECTION, SOLUTION INTRAMUSCULAR; INTRAVENOUS; SUBCUTANEOUS at 09:02

## 2024-01-07 RX ADMIN — Medication 14 MG: at 08:49

## 2024-01-07 RX ADMIN — OXAZEPAM 25 MG: 10 CAPSULE, GELATIN COATED ORAL at 05:50

## 2024-01-07 RX ADMIN — LACTULOSE 30 G: 20 SOLUTION ORAL at 21:36

## 2024-01-07 RX ADMIN — OCTREOTIDE ACETATE 25 MCG/HR: 500 INJECTION, SOLUTION INTRAVENOUS; SUBCUTANEOUS at 05:27

## 2024-01-07 RX ADMIN — POTASSIUM PHOSPHATE, MONOBASIC POTASSIUM PHOSPHATE, DIBASIC 21 MMOL: 224; 236 INJECTION, SOLUTION, CONCENTRATE INTRAVENOUS at 18:06

## 2024-01-07 RX ADMIN — PANTOPRAZOLE SODIUM 40 MG: 40 INJECTION, POWDER, FOR SOLUTION INTRAVENOUS at 21:36

## 2024-01-07 RX ADMIN — ENOXAPARIN SODIUM 40 MG: 40 INJECTION SUBCUTANEOUS at 08:47

## 2024-01-07 RX ADMIN — MAGNESIUM SULFATE IN WATER 4 G: 40 INJECTION, SOLUTION INTRAVENOUS at 18:06

## 2024-01-07 RX ADMIN — PANTOPRAZOLE SODIUM 40 MG: 40 INJECTION, POWDER, FOR SOLUTION INTRAVENOUS at 08:47

## 2024-01-07 RX ADMIN — RIFAXIMIN 550 MG: 550 TABLET ORAL at 21:36

## 2024-01-07 RX ADMIN — SODIUM CHLORIDE, SODIUM LACTATE, POTASSIUM CHLORIDE, AND CALCIUM CHLORIDE 75 ML/HR: .6; .31; .03; .02 INJECTION, SOLUTION INTRAVENOUS at 11:25

## 2024-01-07 NOTE — ASSESSMENT & PLAN NOTE
Malnutrition Findings:                                 BMI Findings:           Body mass index is 17.7 kg/m².

## 2024-01-07 NOTE — PLAN OF CARE
Problem: Prexisting or High Potential for Compromised Skin Integrity  Goal: Skin integrity is maintained or improved  Description: INTERVENTIONS:  - Identify patients at risk for skin breakdown  - Assess and monitor skin integrity  - Assess and monitor nutrition and hydration status  - Monitor labs   - Assess for incontinence   - Turn and reposition patient  - Assist with mobility/ambulation  - Relieve pressure over bony prominences  - Avoid friction and shearing  - Provide appropriate hygiene as needed including keeping skin clean and dry  - Evaluate need for skin moisturizer/barrier cream  - Collaborate with interdisciplinary team   - Patient/family teaching  - Consider wound care consult   Outcome: Progressing     Problem: PAIN - ADULT  Goal: Verbalizes/displays adequate comfort level or baseline comfort level  Description: Interventions:  - Encourage patient to monitor pain and request assistance  - Assess pain using appropriate pain scale  - Administer analgesics based on type and severity of pain and evaluate response  - Implement non-pharmacological measures as appropriate and evaluate response  - Consider cultural and social influences on pain and pain management  - Notify physician/advanced practitioner if interventions unsuccessful or patient reports new pain  Outcome: Progressing     Problem: INFECTION - ADULT  Goal: Absence or prevention of progression during hospitalization  Description: INTERVENTIONS:  - Assess and monitor for signs and symptoms of infection  - Monitor lab/diagnostic results  - Monitor all insertion sites, i.e. indwelling lines, tubes, and drains  - Monitor endotracheal if appropriate and nasal secretions for changes in amount and color  - [place] appropriate cooling/warming therapies per order  - Administer medications as ordered  - Instruct and encourage patient and family to use good hand hygiene technique  - Identify and instruct in appropriate isolation precautions for  identified infection/condition  Outcome: Progressing  Goal: Absence of fever/infection during neutropenic period  Description: INTERVENTIONS:  - Monitor WBC    Outcome: Progressing     Problem: SAFETY ADULT  Goal: Patient will remain free of falls  Description: INTERVENTIONS:  - Educate patient/family on patient safety including physical limitations  - Instruct patient to call for assistance with activity   - Consult OT/PT to assist with strengthening/mobility   - Keep Call bell within reach  - Keep bed low and locked with side rails adjusted as appropriate  - Keep care items and personal belongings within reach  - Initiate and maintain comfort rounds  - Make Fall Risk Sign visible to staff  - Offer Toileting every 2 Hours, in advance of need  - Initiate/Maintain bed alarm  - Obtain necessary fall risk management equipment  - Apply yellow socks and bracelet for high fall risk patients  - Consider moving patient to room near nurses station  Outcome: Progressing  Goal: Maintain or return to baseline ADL function  Description: INTERVENTIONS:  -  Assess patient's ability to carry out ADLs; assess patient's baseline for ADL function and identify physical deficits which impact ability to perform ADLs (bathing, care of mouth/teeth, toileting, grooming, dressing, etc.)  - Assess/evaluate cause of self-care deficits   - Assess range of motion  - Assess patient's mobility; develop plan if impaired  - Assess patient's need for assistive devices and provide as appropriate  - Encourage maximum independence but intervene and supervise when necessary  - Involve family in performance of ADLs  - Assess for home care needs following discharge   - Consider OT consult to assist with ADL evaluation and planning for discharge  - Provide patient education as appropriate  Outcome: Progressing  Goal: Maintains/Returns to pre admission functional level  Description: INTERVENTIONS:  - Perform AM-PAC 6 Click Basic Mobility/ Daily Activity  assessment daily.  - Set and communicate daily mobility goal to care team and patient/family/caregiver.   - Collaborate with rehabilitation services on mobility goals if consulted  - Perform Range of Motion 3 times a day.  - Reposition patient every 2 hours.  - Dangle patient 3 times a day  - Stand patient 3 times a day  - Ambulate patient 3 times a day  - Out of bed to chair 3 times a day   - Out of bed for meals 3 times a day  - Out of bed for toileting  - Record patient progress and toleration of activity level   Outcome: Progressing     Problem: DISCHARGE PLANNING  Goal: Discharge to home or other facility with appropriate resources  Description: INTERVENTIONS:  - Identify barriers to discharge w/patient and caregiver  - Arrange for needed discharge resources and transportation as appropriate  - Identify discharge learning needs (meds, wound care, etc.)  - Arrange for interpretive services to assist at discharge as needed  - Refer to Case Management Department for coordinating discharge planning if the patient needs post-hospital services based on physician/advanced practitioner order or complex needs related to functional status, cognitive ability, or social support system  Outcome: Progressing     Problem: Knowledge Deficit  Goal: Patient/family/caregiver demonstrates understanding of disease process, treatment plan, medications, and discharge instructions  Description: Complete learning assessment and assess knowledge base.  Interventions:  - Provide teaching at level of understanding  - Provide teaching via preferred learning methods  Outcome: Progressing     Problem: Nutrition/Hydration-ADULT  Goal: Nutrient/Hydration intake appropriate for improving, restoring or maintaining nutritional needs  Description: Monitor and assess patient's nutrition/hydration status for malnutrition. Collaborate with interdisciplinary team and initiate plan and interventions as ordered.  Monitor patient's weight and dietary  intake as ordered or per policy. Utilize nutrition screening tool and intervene as necessary. Determine patient's food preferences and provide high-protein, high-caloric foods as appropriate.     INTERVENTIONS:  - Monitor oral intake, urinary output, labs, and treatment plans  - Assess nutrition and hydration status and recommend course of action  - Evaluate amount of meals eaten  - Assist patient with eating if necessary   - Allow adequate time for meals  - Recommend/ encourage appropriate diets, oral nutritional supplements, and vitamin/mineral supplements  - Order, calculate, and assess calorie counts as needed  - Recommend, monitor, and adjust tube feedings and TPN/PPN based on assessed needs  - Assess need for intravenous fluids  - Provide specific nutrition/hydration education as appropriate  - Include patient/family/caregiver in decisions related to nutrition  Outcome: Progressing

## 2024-01-07 NOTE — ASSESSMENT & PLAN NOTE
Patient significantly encephalopathic, lethargic with asterixis on exam  Patient currently under thiamine protocol for concern for Warnicke's  Start lactulose 3 times daily and add on Xifaxan

## 2024-01-07 NOTE — OCCUPATIONAL THERAPY NOTE
Occupational Therapy Cancellation        Patient Name: Leslye Holland  Today's Date: 1/7/2024 01/07/24 1334   OT Last Visit   OT Visit Date 01/07/24   Note Type   Note type Cancelled Session   Additional Comments OT Consult received. Chart reviewed. Spoke with RN who reports pt lethargic and minimally responsive at this time. Not appropriate for skilled OT. Will continue to follow and evaluate when medically appropriate.     Lashell Pop, OT

## 2024-01-07 NOTE — ASSESSMENT & PLAN NOTE
On arrival to [facility] patient had dried blood on her mouth  Currently on IV Protonix, octreotide, Rocephin  Plan for EGD, timing TBD.  Waiting for improvement in withdraw and mentation.  Stool record  Trend hemoglobin

## 2024-01-07 NOTE — NURSING NOTE
Pt arrived on floor from ICU.  Pt A&O x3 and resting comfortably in bed.  Agree with previous RN assessment.  Call bell within reach, will continue to monitor closely.    H

## 2024-01-07 NOTE — ASSESSMENT & PLAN NOTE
Patient was admitted to [facility] and treated with phenobarbital protocol  Stop serax with encephalopathy and lethargy

## 2024-01-07 NOTE — PLAN OF CARE
Problem: Prexisting or High Potential for Compromised Skin Integrity  Goal: Skin integrity is maintained or improved  Description: INTERVENTIONS:  - Identify patients at risk for skin breakdown  - Assess and monitor skin integrity  - Assess and monitor nutrition and hydration status  - Monitor labs   - Assess for incontinence   - Turn and reposition patient  - Assist with mobility/ambulation  - Relieve pressure over bony prominences  - Avoid friction and shearing  - Provide appropriate hygiene as needed including keeping skin clean and dry  - Evaluate need for skin moisturizer/barrier cream  - Collaborate with interdisciplinary team   - Patient/family teaching  - Consider wound care consult   Outcome: Progressing     Problem: PAIN - ADULT  Goal: Verbalizes/displays adequate comfort level or baseline comfort level  Description: Interventions:  - Encourage patient to monitor pain and request assistance  - Assess pain using appropriate pain scale  - Administer analgesics based on type and severity of pain and evaluate response  - Implement non-pharmacological measures as appropriate and evaluate response  - Consider cultural and social influences on pain and pain management  - Notify physician/advanced practitioner if interventions unsuccessful or patient reports new pain  Outcome: Progressing     Problem: INFECTION - ADULT  Goal: Absence or prevention of progression during hospitalization  Description: INTERVENTIONS:  - Assess and monitor for signs and symptoms of infection  - Monitor lab/diagnostic results  - Monitor all insertion sites, i.e. indwelling lines, tubes, and drains  - Monitor endotracheal if appropriate and nasal secretions for changes in amount and color  - Howes appropriate cooling/warming therapies per order  - Administer medications as ordered  - Instruct and encourage patient and family to use good hand hygiene technique  - Identify and instruct in appropriate isolation precautions for  identified infection/condition  Outcome: Progressing  Goal: Absence of fever/infection during neutropenic period  Description: INTERVENTIONS:  - Monitor WBC    Outcome: Progressing     Problem: SAFETY ADULT  Goal: Patient will remain free of falls  Description: INTERVENTIONS:  - Educate patient/family on patient safety including physical limitations  - Instruct patient to call for assistance with activity   - Consult OT/PT to assist with strengthening/mobility   - Keep Call bell within reach  - Keep bed low and locked with side rails adjusted as appropriate  - Keep care items and personal belongings within reach  - Initiate and maintain comfort rounds  - Make Fall Risk Sign visible to staff  - Offer Toileting every  Hours, in advance of need  - Initiate/Maintain alarm  - Obtain necessary fall risk management equipment:   - Apply yellow socks and bracelet for high fall risk patients  - Consider moving patient to room near nurses station  Outcome: Progressing  Goal: Maintain or return to baseline ADL function  Description: INTERVENTIONS:  -  Assess patient's ability to carry out ADLs; assess patient's baseline for ADL function and identify physical deficits which impact ability to perform ADLs (bathing, care of mouth/teeth, toileting, grooming, dressing, etc.)  - Assess/evaluate cause of self-care deficits   - Assess range of motion  - Assess patient's mobility; develop plan if impaired  - Assess patient's need for assistive devices and provide as appropriate  - Encourage maximum independence but intervene and supervise when necessary  - Involve family in performance of ADLs  - Assess for home care needs following discharge   - Consider OT consult to assist with ADL evaluation and planning for discharge  - Provide patient education as appropriate  Outcome: Progressing  Goal: Maintains/Returns to pre admission functional level  Description: INTERVENTIONS:  - Perform AM-PAC 6 Click Basic Mobility/ Daily Activity  assessment daily.  - Set and communicate daily mobility goal to care team and patient/family/caregiver.   - Collaborate with rehabilitation services on mobility goals if consulted  - Perform Range of Motion  times a day.  - Reposition patient every  hours.  - Dangle patient  times a day  - Stand patient  times a day  - Ambulate patient  times a day  - Out of bed to chair  times a day   - Out of bed for meals  times a day  - Out of bed for toileting  - Record patient progress and toleration of activity level   Outcome: Progressing     Problem: DISCHARGE PLANNING  Goal: Discharge to home or other facility with appropriate resources  Description: INTERVENTIONS:  - Identify barriers to discharge w/patient and caregiver  - Arrange for needed discharge resources and transportation as appropriate  - Identify discharge learning needs (meds, wound care, etc.)  - Arrange for interpretive services to assist at discharge as needed  - Refer to Case Management Department for coordinating discharge planning if the patient needs post-hospital services based on physician/advanced practitioner order or complex needs related to functional status, cognitive ability, or social support system  Outcome: Progressing     Problem: Knowledge Deficit  Goal: Patient/family/caregiver demonstrates understanding of disease process, treatment plan, medications, and discharge instructions  Description: Complete learning assessment and assess knowledge base.  Interventions:  - Provide teaching at level of understanding  - Provide teaching via preferred learning methods  Outcome: Progressing     Problem: Nutrition/Hydration-ADULT  Goal: Nutrient/Hydration intake appropriate for improving, restoring or maintaining nutritional needs  Description: Monitor and assess patient's nutrition/hydration status for malnutrition. Collaborate with interdisciplinary team and initiate plan and interventions as ordered.  Monitor patient's weight and dietary intake as  ordered or per policy. Utilize nutrition screening tool and intervene as necessary. Determine patient's food preferences and provide high-protein, high-caloric foods as appropriate.     INTERVENTIONS:  - Monitor oral intake, urinary output, labs, and treatment plans  - Assess nutrition and hydration status and recommend course of action  - Evaluate amount of meals eaten  - Assist patient with eating if necessary   - Allow adequate time for meals  - Recommend/ encourage appropriate diets, oral nutritional supplements, and vitamin/mineral supplements  - Order, calculate, and assess calorie counts as needed  - Recommend, monitor, and adjust tube feedings and TPN/PPN based on assessed needs  - Assess need for intravenous fluids  - Provide specific nutrition/hydration education as appropriate  - Include patient/family/caregiver in decisions related to nutrition  Outcome: Progressing     Problem: NEUROSENSORY - ADULT  Goal: Achieves stable or improved neurological status  Description: INTERVENTIONS  - Monitor and report changes in neurological status  - Monitor vital signs such as temperature, blood pressure, glucose, and any other labs ordered   - Initiate measures to prevent increased intracranial pressure  - Monitor for seizure activity and implement precautions if appropriate      Outcome: Progressing  Goal: Achieves maximal functionality and self care  Description: INTERVENTIONS  - Monitor swallowing and airway patency with patient fatigue and changes in neurological status  - Encourage and assist patient to increase activity and self care.   - Encourage visually impaired, hearing impaired and aphasic patients to use assistive/communication devices  Outcome: Progressing     Problem: GASTROINTESTINAL - ADULT  Goal: Minimal or absence of nausea and/or vomiting  Description: INTERVENTIONS:  - Administer IV fluids if ordered to ensure adequate hydration  - Maintain NPO status until nausea and vomiting are resolved  -  Nasogastric tube if ordered  - Administer ordered antiemetic medications as needed  - Provide nonpharmacologic comfort measures as appropriate  - Advance diet as tolerated, if ordered  - Consider nutrition services referral to assist patient with adequate nutrition and appropriate food choices  Outcome: Progressing  Goal: Maintains adequate nutritional intake  Description: INTERVENTIONS:  - Monitor percentage of each meal consumed  - Identify factors contributing to decreased intake, treat as appropriate  - Assist with meals as needed  - Monitor I&O, weight, and lab values if indicated  - Obtain nutrition services referral as needed  Outcome: Progressing  Goal: Oral mucous membranes remain intact  Description: INTERVENTIONS  - Assess oral mucosa and hygiene practices  - Implement preventative oral hygiene regimen  - Implement oral medicated treatments as ordered  - Initiate Nutrition services referral as needed  Outcome: Progressing     Problem: GENITOURINARY - ADULT  Goal: Absence of urinary retention  Description: INTERVENTIONS:  - Assess patient’s ability to void and empty bladder  - Monitor I/O  - Bladder scan as needed  - Discuss with physician/AP medications to alleviate retention as needed  - Discuss catheterization for long term situations as appropriate  Outcome: Progressing     Problem: METABOLIC, FLUID AND ELECTROLYTES - ADULT  Goal: Electrolytes maintained within normal limits  Description: INTERVENTIONS:  - Monitor labs and assess patient for signs and symptoms of electrolyte imbalances  - Administer electrolyte replacement as ordered  - Monitor response to electrolyte replacements, including repeat lab results as appropriate  - Instruct patient on fluid and nutrition as appropriate  Outcome: Progressing  Goal: Fluid balance maintained  Description: INTERVENTIONS:  - Monitor labs   - Monitor I/O and WT  - Instruct patient on fluid and nutrition as appropriate  - Assess for signs & symptoms of volume  excess or deficit  Outcome: Progressing     Problem: SKIN/TISSUE INTEGRITY - ADULT  Goal: Skin Integrity remains intact(Skin Breakdown Prevention)  Description: Assess:  -Perform Leon assessment every   -Clean and moisturize skin every   -Inspect skin when repositioning, toileting, and assisting with ADLS  -Assess under medical devices such as  every   -Assess extremities for adequate circulation and sensation     Bed Management:  -Have minimal linens on bed & keep smooth, unwrinkled  -Change linens as needed when moist or perspiring  -Avoid sitting or lying in one position for more than  hours while in bed  -Keep HOB at degrees     Toileting:  -Offer bedside commode  -Assess for incontinence every   -Use incontinent care products after each incontinent episode such as     Activity:  -Mobilize patient  times a day  -Encourage activity and walks on unit  -Encourage or provide ROM exercises   -Turn and reposition patient every  Hours  -Use appropriate equipment to lift or move patient in bed  -Instruct/ Assist with weight shifting every  when out of bed in chair  -Consider limitation of chair time  hour intervals    Skin Care:  -Avoid use of baby powder, tape, friction and shearing, hot water or constrictive clothing  -Relieve pressure over bony prominences using   -Do not massage red bony areas    Next Steps:  -Teach patient strategies to minimize risks such as    -Consider consults to  interdisciplinary teams such as   Outcome: Progressing     Problem: MUSCULOSKELETAL - ADULT  Goal: Maintain or return mobility to safest level of function  Description: INTERVENTIONS:  - Assess patient's ability to carry out ADLs; assess patient's baseline for ADL function and identify physical deficits which impact ability to perform ADLs (bathing, care of mouth/teeth, toileting, grooming, dressing, etc.)  - Assess/evaluate cause of self-care deficits   - Assess range of motion  - Assess patient's mobility  - Assess patient's need  for assistive devices and provide as appropriate  - Encourage maximum independence but intervene and supervise when necessary  - Involve family in performance of ADLs  - Assess for home care needs following discharge   - Consider OT consult to assist with ADL evaluation and planning for discharge  - Provide patient education as appropriate  Outcome: Progressing

## 2024-01-07 NOTE — PROGRESS NOTES
[facility]  Progress Note  Name: Leslye Holland I  MRN: [id number]  Unit/Bed#: E4 -01 I Date of Admission: 1/5/2024   Date of Service: 1/7/2024 I Hospital Day: 2    Assessment/Plan   * Hematemesis  Assessment & Plan  On arrival to [facility] patient had dried blood on her mouth  Currently on IV Protonix, octreotide, Rocephin  Plan for EGD, timing TBD.  Waiting for improvement in withdraw and mentation.  Stool record  Trend hemoglobin    Alcoholic hepatitis  Assessment & Plan  MDF less than 32   Follow MELD Labs       Hypophosphatemia  Assessment & Plan  Will check and replete as needed    Mild protein-calorie malnutrition (HCC)  Assessment & Plan  Malnutrition Findings:                                 BMI Findings:           Body mass index is 17.7 kg/m².       Hepatic encephalopathy (HCC)  Assessment & Plan  Patient significantly encephalopathic, lethargic with asterixis on exam  Patient currently under thiamine protocol for concern for Warnicke's  Start lactulose 3 times daily and add on Xifaxan    Hyponatremia  Assessment & Plan  Likely combination of poor oral intake, low solute intake, beer Poto ty   resolved    Alcohol withdrawal with complication with inpatient treatment (HCC)  Assessment & Plan  Patient was admitted to [facility] and treated with phenobarbital protocol  Stop serax with encephalopathy and lethargy           VTE Pharmacologic Prophylaxis: hold dvt ppx     Patient Centered Rounds:  Patient care rounds were performed with nursing    Education and Discussions with Family / Patient: Patient     Time Spent for Care: I have spent a total time of 40 minutes on 01/07/24 in caring for this patient including Diagnostic results, Risks and benefits of tx options, Impressions, Counseling / Coordination of care, Reviewing / ordering tests, medicine, procedures  , Obtaining or reviewing history  , and Communicating with other healthcare professionals  .      Current Length of Stay: 2 day(s)    Current Patient Status: Inpatient   Certification Statement: The patient will continue to require additional inpatient hospital stay due to need for IV abx, octreotide, Protonix and inpatient procedure     Discharge Plan: when cleared by GI, likely mid next week     Code Status: Level 1 - Full Code      Subjective:   Patient seen and evaluated at bedside. Very tired.     Objective:     Vitals:   Temp (24hrs), Av.7 °F (36.5 °C), Min:96.4 °F (35.8 °C), Max:99.6 °F (37.6 °C)    Temp:  [96.4 °F (35.8 °C)-99.6 °F (37.6 °C)] 97.6 °F (36.4 °C)  HR:  [] 86  Resp:  [14-50] 20  BP: ()/(59-86) 96/68  SpO2:  [98 %-100 %] 98 %  Body mass index is 17.7 kg/m².     Input and Output Summary (last 24 hours):       Intake/Output Summary (Last 24 hours) at 2024 1013  Last data filed at 2024 0701  Gross per 24 hour   Intake 2318.96 ml   Output 1075 ml   Net 1243.96 ml       Physical Exam:     Physical Exam  Vitals reviewed.   Constitutional:       General: She is not in acute distress.     Appearance: She is well-developed. She is ill-appearing and toxic-appearing. She is not diaphoretic.   HENT:      Head: Normocephalic and atraumatic.      Mouth/Throat:      Mouth: Mucous membranes are dry.      Comments: Dried blood on lips  Eyes:      General: No scleral icterus.     Extraocular Movements: Extraocular movements intact.   Cardiovascular:      Rate and Rhythm: Normal rate and regular rhythm.      Heart sounds: Normal heart sounds.   Pulmonary:      Effort: Pulmonary effort is normal. No respiratory distress.      Breath sounds: Normal breath sounds. No wheezing or rales.   Abdominal:      General: There is no distension.      Palpations: Abdomen is soft.      Tenderness: There is no abdominal tenderness. There is no guarding or rebound.   Musculoskeletal:         General: No swelling, tenderness or deformity.   Skin:     General: Skin is warm and dry.   Neurological:       General: No focal deficit present.      Mental Status: She is alert. Mental status is at baseline.   Psychiatric:         Mood and Affect: Mood normal.         Behavior: Behavior normal.         Thought Content: Thought content normal.         Judgment: Judgment normal.         Additional Data:     Labs: I have reviewed pertinent results     Results from last 7 days   Lab Units 01/07/24 0228 01/06/24 0857 01/06/24  0430 01/06/24  0203 01/05/24 2010   WBC Thousand/uL  --   --  9.27  --  8.04   HEMOGLOBIN g/dL 8.1*   < > 9.1*   < > 9.2*   HEMATOCRIT % 24.4*   < > 26.3*   < > 26.2*   PLATELETS Thousands/uL  --   --  136*  --  109*   BANDS PCT %  --   --  7  --   --    NEUTROS PCT %  --   --   --   --  66   LYMPHS PCT %  --   --   --   --  21   LYMPHO PCT %  --   --  21  --   --    MONOS PCT %  --   --   --   --  12   MONO PCT %  --   --  10  --   --    EOS PCT %  --   --  0  --  0    < > = values in this interval not displayed.     Results from last 7 days   Lab Units 01/06/24 2027 01/06/24 0857 01/06/24  0430   SODIUM mmol/L 135   < >  --    POTASSIUM mmol/L 3.3*   < >  --    CHLORIDE mmol/L 113*   < >  --    CO2 mmol/L 13*   < >  --    BUN mg/dL 3*   < >  --    CREATININE mg/dL 0.37*   < >  --    ANION GAP mmol/L 9   < >  --    CALCIUM mg/dL 7.2*   < >  --    ALBUMIN g/dL  --   --  2.9*  2.9*   TOTAL BILIRUBIN mg/dL  --   --  4.50*  4.50*   ALK PHOS U/L  --   --  218*  218*   ALT U/L  --   --  26  26   AST U/L  --   --  69*  70*   GLUCOSE RANDOM mg/dL 126   < >  --     < > = values in this interval not displayed.     Results from last 7 days   Lab Units 01/06/24  0430   INR  1.21*             Results from last 7 days   Lab Units 01/06/24  1317   LACTIC ACID mmol/L 0.8         Imaging: I have reviewed pertinent imaging       Recent Cultures (last 7 days):     Results from last 7 days   Lab Units 01/05/24  1236   URINE CULTURE  >100,000 cfu/ml Gram Negative Amado Enteric Like*  >100,000 cfu/ml  Enterococcus species*       Last 24 Hours Medication List:   Current Facility-Administered Medications   Medication Dose Route Frequency Provider Last Rate    cefTRIAXone  1,000 mg Intravenous Q24H Bell Almodovar MD Stopped (01/06/24 1200)    chlorhexidine  15 mL Mouth/Throat Q12H [facility] Bell Almodovar MD      enoxaparin  40 mg Subcutaneous Daily Bell Almodovar MD      [START ON 1/8/2024] folic acid  1 mg Oral Daily Greg Andrews DO      lactated ringers  75 mL/hr Intravenous Continuous Greg Andrews DO      lactulose  30 g Oral TID Greg Andrews DO      multivitamin-minerals  1 tablet Oral Daily Bell Almodovar MD      nicotine  14 mg Transdermal Daily Bell Almodovar MD      octreotide  25 mcg/hr Intravenous Continuous Bell Almodovar MD 25 mcg/hr (01/07/24 0527)    ondansetron  4 mg Intravenous Q6H PRN Bell Almodovar MD      pantoprazole  40 mg Intravenous Q12H [facility] Bell Almodovar MD      rifaximin  550 mg Oral Q12H [facility] Greg Andrews DO      [START ON 1/8/2024] thiamine  250 mg Intravenous Daily Greg Andrews DO          Today, Patient Was Seen By: Greg Andrews DO    ** Please Note: Dictation voice to text software may have been used in the creation of this document. **

## 2024-01-07 NOTE — PHYSICAL THERAPY NOTE
PHYSICAL THERAPY NOTE          Patient Name: Leslye Holland  Today's Date: 1/7/2024 01/07/24 1354   PT Last Visit   PT Visit Date 01/07/24   Note Type   Note type Cancelled Session   Cancel Reasons Medical status   Additional Comments Pt consult received. Chart reviewed. Attempted PT eval however RN reported that pt lethargic & will not be able to participate in PT/OT evals at this time. Hence will defer PT eval. Will continue to follow as medically appropriate.                                                             Maykel Nguyễn

## 2024-01-08 ENCOUNTER — ANESTHESIA EVENT (INPATIENT)
Dept: GASTROENTEROLOGY | Facility: HOSPITAL | Age: 49
End: 2024-01-08
Payer: COMMERCIAL

## 2024-01-08 PROBLEM — R82.71 BACTERIA IN URINE: Status: ACTIVE | Noted: 2024-01-08

## 2024-01-08 PROBLEM — T74.91XA DOMESTIC VIOLENCE OF ADULT: Status: ACTIVE | Noted: 2024-01-08

## 2024-01-08 PROBLEM — I95.9 HYPOTENSION: Status: ACTIVE | Noted: 2024-01-08

## 2024-01-08 PROBLEM — D62 ACUTE BLOOD LOSS ANEMIA: Status: ACTIVE | Noted: 2024-01-08

## 2024-01-08 LAB
ALBUMIN SERPL BCP-MCNC: 3 G/DL (ref 3.5–5)
ALP SERPL-CCNC: 122 U/L (ref 34–104)
ALT SERPL W P-5'-P-CCNC: 16 U/L (ref 7–52)
ANA SER QL IA: NEGATIVE
ANION GAP SERPL CALCULATED.3IONS-SCNC: 11 MMOL/L
ANION GAP SERPL CALCULATED.3IONS-SCNC: 9 MMOL/L
AST SERPL W P-5'-P-CCNC: 40 U/L (ref 13–39)
ATRIAL RATE: 90 BPM
BILIRUB DIRECT SERPL-MCNC: 1.47 MG/DL (ref 0–0.2)
BILIRUB SERPL-MCNC: 2.31 MG/DL (ref 0.2–1)
BUN SERPL-MCNC: 2 MG/DL (ref 5–25)
BUN SERPL-MCNC: 3 MG/DL (ref 5–25)
CALCIUM SERPL-MCNC: 7 MG/DL (ref 8.4–10.2)
CALCIUM SERPL-MCNC: 7 MG/DL (ref 8.4–10.2)
CHLORIDE SERPL-SCNC: 111 MMOL/L (ref 96–108)
CHLORIDE SERPL-SCNC: 114 MMOL/L (ref 96–108)
CO2 SERPL-SCNC: 11 MMOL/L (ref 21–32)
CO2 SERPL-SCNC: 14 MMOL/L (ref 21–32)
CREAT SERPL-MCNC: 0.35 MG/DL (ref 0.6–1.3)
CREAT SERPL-MCNC: 0.54 MG/DL (ref 0.6–1.3)
ERYTHROCYTE [DISTWIDTH] IN BLOOD BY AUTOMATED COUNT: 14.5 % (ref 11.6–15.1)
GFR SERPL CREATININE-BSD FRML MDRD: 112 ML/MIN/1.73SQ M
GFR SERPL CREATININE-BSD FRML MDRD: 130 ML/MIN/1.73SQ M
GLUCOSE SERPL-MCNC: 288 MG/DL (ref 65–140)
GLUCOSE SERPL-MCNC: 94 MG/DL (ref 65–140)
HAV AB SER QL IA: NORMAL
HBV CORE AB SER QL: NORMAL
HBV SURFACE AB SER-ACNC: <3 MIU/ML
HBV SURFACE AG SER QL: NORMAL
HCT VFR BLD AUTO: 21.4 % (ref 34.8–46.1)
HCT VFR BLD AUTO: 25 % (ref 34.8–46.1)
HCV AB SER QL: NORMAL
HGB BLD-MCNC: 7.2 G/DL (ref 11.5–15.4)
HGB BLD-MCNC: 7.9 G/DL (ref 11.5–15.4)
INR PPP: 1.37 (ref 0.84–1.19)
MAGNESIUM SERPL-MCNC: 1.9 MG/DL (ref 1.9–2.7)
MCH RBC QN AUTO: 32.3 PG (ref 26.8–34.3)
MCHC RBC AUTO-ENTMCNC: 33.6 G/DL (ref 31.4–37.4)
MCV RBC AUTO: 96 FL (ref 82–98)
P AXIS: 46 DEGREES
PHOSPHATE SERPL-MCNC: 2.2 MG/DL (ref 2.7–4.5)
PLATELET # BLD AUTO: 126 THOUSANDS/UL (ref 149–390)
PMV BLD AUTO: 11.2 FL (ref 8.9–12.7)
POTASSIUM SERPL-SCNC: 3.1 MMOL/L (ref 3.5–5.3)
POTASSIUM SERPL-SCNC: 3.7 MMOL/L (ref 3.5–5.3)
PR INTERVAL: 172 MS
PROT SERPL-MCNC: 5.1 G/DL (ref 6.4–8.4)
PROTHROMBIN TIME: 17.1 SECONDS (ref 11.6–14.5)
QRS AXIS: 83 DEGREES
QRSD INTERVAL: 86 MS
QT INTERVAL: 368 MS
QTC INTERVAL: 450 MS
RBC # BLD AUTO: 2.23 MILLION/UL (ref 3.81–5.12)
SODIUM SERPL-SCNC: 133 MMOL/L (ref 135–147)
SODIUM SERPL-SCNC: 137 MMOL/L (ref 135–147)
T WAVE AXIS: 15 DEGREES
VENTRICULAR RATE: 90 BPM
WBC # BLD AUTO: 7.2 THOUSAND/UL (ref 4.31–10.16)

## 2024-01-08 PROCEDURE — 85027 COMPLETE CBC AUTOMATED: CPT | Performed by: INTERNAL MEDICINE

## 2024-01-08 PROCEDURE — P9040 RBC LEUKOREDUCED IRRADIATED: HCPCS

## 2024-01-08 PROCEDURE — 85018 HEMOGLOBIN: CPT

## 2024-01-08 PROCEDURE — 97167 OT EVAL HIGH COMPLEX 60 MIN: CPT

## 2024-01-08 PROCEDURE — 84100 ASSAY OF PHOSPHORUS: CPT | Performed by: INTERNAL MEDICINE

## 2024-01-08 PROCEDURE — 99232 SBSQ HOSP IP/OBS MODERATE 35: CPT | Performed by: STUDENT IN AN ORGANIZED HEALTH CARE EDUCATION/TRAINING PROGRAM

## 2024-01-08 PROCEDURE — C9113 INJ PANTOPRAZOLE SODIUM, VIA: HCPCS

## 2024-01-08 PROCEDURE — 94762 N-INVAS EAR/PLS OXIMTRY CONT: CPT

## 2024-01-08 PROCEDURE — 97163 PT EVAL HIGH COMPLEX 45 MIN: CPT | Performed by: PHYSICAL THERAPIST

## 2024-01-08 PROCEDURE — 80048 BASIC METABOLIC PNL TOTAL CA: CPT

## 2024-01-08 PROCEDURE — 99233 SBSQ HOSP IP/OBS HIGH 50: CPT | Performed by: INTERNAL MEDICINE

## 2024-01-08 PROCEDURE — 30233N1 TRANSFUSION OF NONAUTOLOGOUS RED BLOOD CELLS INTO PERIPHERAL VEIN, PERCUTANEOUS APPROACH: ICD-10-PCS | Performed by: INTERNAL MEDICINE

## 2024-01-08 PROCEDURE — 80076 HEPATIC FUNCTION PANEL: CPT | Performed by: INTERNAL MEDICINE

## 2024-01-08 PROCEDURE — 97535 SELF CARE MNGMENT TRAINING: CPT

## 2024-01-08 PROCEDURE — 97530 THERAPEUTIC ACTIVITIES: CPT | Performed by: PHYSICAL THERAPIST

## 2024-01-08 PROCEDURE — 83735 ASSAY OF MAGNESIUM: CPT | Performed by: INTERNAL MEDICINE

## 2024-01-08 PROCEDURE — 85610 PROTHROMBIN TIME: CPT | Performed by: INTERNAL MEDICINE

## 2024-01-08 PROCEDURE — 85014 HEMATOCRIT: CPT

## 2024-01-08 PROCEDURE — 93005 ELECTROCARDIOGRAM TRACING: CPT

## 2024-01-08 PROCEDURE — 80048 BASIC METABOLIC PNL TOTAL CA: CPT | Performed by: INTERNAL MEDICINE

## 2024-01-08 RX ORDER — ALBUMIN (HUMAN) 12.5 G/50ML
25 SOLUTION INTRAVENOUS ONCE
Status: COMPLETED | OUTPATIENT
Start: 2024-01-08 | End: 2024-01-08

## 2024-01-08 RX ORDER — CALCIUM CARBONATE 500 MG/1
500 TABLET, CHEWABLE ORAL 3 TIMES DAILY
Status: DISCONTINUED | OUTPATIENT
Start: 2024-01-08 | End: 2024-01-11

## 2024-01-08 RX ORDER — ALBUMIN, HUMAN INJ 5% 5 %
25 SOLUTION INTRAVENOUS ONCE
Status: COMPLETED | OUTPATIENT
Start: 2024-01-08 | End: 2024-01-08

## 2024-01-08 RX ORDER — MAGNESIUM SULFATE 1 G/100ML
1 INJECTION INTRAVENOUS ONCE
Status: COMPLETED | OUTPATIENT
Start: 2024-01-08 | End: 2024-01-08

## 2024-01-08 RX ORDER — LORAZEPAM 2 MG/ML
0.5 INJECTION INTRAMUSCULAR ONCE
Status: COMPLETED | OUTPATIENT
Start: 2024-01-08 | End: 2024-01-08

## 2024-01-08 RX ORDER — POTASSIUM CHLORIDE 20 MEQ/1
40 TABLET, EXTENDED RELEASE ORAL ONCE
Status: COMPLETED | OUTPATIENT
Start: 2024-01-08 | End: 2024-01-08

## 2024-01-08 RX ADMIN — THIAMINE HYDROCHLORIDE 250 MG: 100 INJECTION, SOLUTION INTRAMUSCULAR; INTRAVENOUS at 09:06

## 2024-01-08 RX ADMIN — PANTOPRAZOLE SODIUM 40 MG: 40 INJECTION, POWDER, FOR SOLUTION INTRAVENOUS at 20:32

## 2024-01-08 RX ADMIN — FOLIC ACID 1 MG: 1 TABLET ORAL at 08:23

## 2024-01-08 RX ADMIN — ALBUMIN (HUMAN) 25 G: 0.25 INJECTION, SOLUTION INTRAVENOUS at 15:09

## 2024-01-08 RX ADMIN — CALCIUM CARBONATE 500 MG: 500 TABLET, CHEWABLE ORAL at 13:24

## 2024-01-08 RX ADMIN — POTASSIUM CHLORIDE 40 MEQ: 1500 TABLET, EXTENDED RELEASE ORAL at 08:23

## 2024-01-08 RX ADMIN — LORAZEPAM 0.5 MG: 2 INJECTION INTRAMUSCULAR; INTRAVENOUS at 13:48

## 2024-01-08 RX ADMIN — CEFTRIAXONE 1000 MG: 1 INJECTION, SOLUTION INTRAVENOUS at 10:47

## 2024-01-08 RX ADMIN — DEXTROSE, SODIUM CHLORIDE, AND POTASSIUM CHLORIDE 75 ML/HR: 5; .9; .15 INJECTION INTRAVENOUS at 06:13

## 2024-01-08 RX ADMIN — SODIUM CHLORIDE 500 ML: 0.9 INJECTION, SOLUTION INTRAVENOUS at 00:48

## 2024-01-08 RX ADMIN — PANTOPRAZOLE SODIUM 40 MG: 40 INJECTION, POWDER, FOR SOLUTION INTRAVENOUS at 08:24

## 2024-01-08 RX ADMIN — CHLORHEXIDINE GLUCONATE 0.12% ORAL RINSE 15 ML: 1.2 LIQUID ORAL at 08:23

## 2024-01-08 RX ADMIN — RIFAXIMIN 550 MG: 550 TABLET ORAL at 20:32

## 2024-01-08 RX ADMIN — OCTREOTIDE ACETATE 25 MCG/HR: 500 INJECTION, SOLUTION INTRAVENOUS; SUBCUTANEOUS at 04:02

## 2024-01-08 RX ADMIN — RIFAXIMIN 550 MG: 550 TABLET ORAL at 08:23

## 2024-01-08 RX ADMIN — MAGNESIUM SULFATE IN DEXTROSE 1 G: 10 INJECTION, SOLUTION INTRAVENOUS at 13:22

## 2024-01-08 RX ADMIN — LACTULOSE 30 G: 20 SOLUTION ORAL at 08:23

## 2024-01-08 RX ADMIN — ALBUMIN (HUMAN) 25 G: 12.5 INJECTION, SOLUTION INTRAVENOUS at 02:20

## 2024-01-08 RX ADMIN — Medication 1 TABLET: at 08:23

## 2024-01-08 NOTE — QUICK NOTE
Overnight: 01/08/24:    RN paged overnight regarding hypotension. Patient evaluated, sleeping on arrival but easily awakes to voice. Oriented to place, time, events. No complaints. Repeat labs at 0000 to trend H/H and renal function. Trial small fluid bolus/albumin without significant change. Trend labs in AM, monitor urine output, if persistent hypotension start trial of midodrine.

## 2024-01-08 NOTE — PLAN OF CARE
Problem: OCCUPATIONAL THERAPY ADULT  Goal: Performs self-care activities at highest level of function for planned discharge setting.  See evaluation for individualized goals.  Description: Treatment Interventions: ADL retraining, Functional transfer training, UE strengthening/ROM, Endurance training, Patient/family training, Cognitive reorientation, Equipment evaluation/education, Compensatory technique education, Neuromuscular reeducation, Continued evaluation, Energy conservation, Activityengagement          See flowsheet documentation for full assessment, interventions and recommendations.   Note: Limitation: Decreased ADL status, Decreased UE strength, Decreased Safe judgement during ADL, Decreased endurance, Decreased high-level ADLs, Decreased self-care trans  Prognosis: Fair  Assessment: Patient is a 48 y.o. year old female seen for OT eval s/p admit to [facility] on 1/5/2024 with hematemesis, alcoholic hepatitis, ABLA, hypotension, bacteria in urine. Pt was in detox unit at  w/ alcohol withdrawal and transferred to [facility] for higher level of care. Personal factors affecting pt at time of IE include: difficulty performing ADLs and IADLs, difficulty with functional mobility/transfers. Patient demonstrates the following deficits impacting occupational performance: weakness, decreased strength , decreased balance, decreased activity tolerance, limited functional reach, impaired memory, impaired problem solving, decreased safety awareness, hypotension, dizziness, lethargy , impaired coordination, decreased cardiovascular endurance, and decreased skin integrity . These impairments, as well at pt’s JEREM[facility]AS home environment, steps within home environment, difficulty performing ADLs, difficulty performing IADLs, difficulty performing transfers/mobility, limited insight into deficits, decreased initiation and engagement, fall risk , functional decline , new use of AD for functional transfers/mobility, multiple admissions , and  inability to perform caregiver duties , limit pt’s ability to safely engage in all baseline areas of occupation. Pt currently functioning at SBA-Abdirizak level for ADLs and functional transfers/mobility w/ RW. She is persistently hypotensive however reports minimal symptoms of dizziness that starts during transitioning positions but appears to clear within minutes. Refer to flowsheet for BPs. Patient would benefit from continued skilled OT therapy while in acute setting to address deficits as defined above and maximize (I) with ADLs and functional mobility.  Occupational performance areas to address include: eating, grooming, bathing/shower, toilet hygiene, dressing, medication management, health maintenance, functional mobility, cleaning, meal prep, household maintenance, and care of children. Based on the aforementioned OT evaluation, functional performance deficits, and assessments, pt has been identified as a high complexity evaluation. At this time, recommendation for pt to receive post-acute rehabilitation services at a Level II (moderate resource intensity) due to above deficits and CLOF. OT will continue to follow pt 2-5x/wk to address the following goals to  w/in 10-14 days.     Rehab Resource Intensity Level, OT: II (Moderate Resource Intensity)

## 2024-01-08 NOTE — UTILIZATION REVIEW
NOTIFICATION OF ADMISSION DISCHARGE   This is a Notification of Discharge from [facility]. Please be advised that this patient has been discharge from our facility. Below you will find the admission and discharge date and time including the patient’s disposition.   UTILIZATION REVIEW CONTACT:  Calista Brambila MA  Utilization   Network Utilization Review Department  Phone: [phone] x carefully listen to the prompts. All voicemails are confidential.  Email: [email or web address]     ADMISSION INFORMATION  PRESENTATION DATE: 1/4/2024 11:44 AM  OBERVATION ADMISSION DATE:   INPATIENT ADMISSION DATE: 1/4/24  3:34 PM   DISCHARGE DATE: 1/5/2024  7:07 PM   DISPOSITION:[facility] Utilization Review Department  ATTENTION: Please call with any questions or concerns to [phone] and carefully listen to the prompts so that you are directed to the right person. All voicemails are confidential.   For Discharge needs, contact Care Management DC Support Team at [phone] opt. 2  Send all requests for admission clinical reviews, approved or denied determinations and any other requests to dedicated fax number below belonging to the campus where the patient is receiving treatment. List of dedicated fax numbers for the Facilities:  FACILITY NAME UR FAX NUMBER   ADMISSION DENIALS (Administrative/Medical Necessity) [phone]   DISCHARGE SUPPORT TEAM ([facility]) [phone]   PARENT CHILD HEALTH (Maternity/NICU/Pediatrics) [phone]   [facility] [phone]   [facility] [phone]   [facility] [phone]   [facility] [phone]   [facility] [phone]   General acute hospital [phone]   General acute hospital [phone]   [facility]  [phone]   [facility] [phone]   [facility] [phone]   [facility] [phone]
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
## 2024-01-08 NOTE — ASSESSMENT & PLAN NOTE
Urine culture polymicrobial, however corresponding UA without evidence of infection  Positive cx due to bacturia/colonization  UA collected prior to any antibiotics  Dedicated abx not indicated

## 2024-01-08 NOTE — SPEECH THERAPY NOTE
Speech Language/Pathology    Orders Received. Chart Reviewed. Pt NPO for EGD. Not appropriate for swallow at this time. ST will continue to f/u as indicated.

## 2024-01-08 NOTE — ASSESSMENT & PLAN NOTE
"Pt has h/o hepatic steatosis, likley due to alcohol abuse  RUQ US with \"severe hepatic steatosis.  Coexisting fibrotic or inflammatory changes not excluded\"  Suspect developing cirrhosis with   Varices and portal HTN on CT  Coagulopathy: INR approx 1.2  Thrombocytopenia  Hepatic encephalopathy/hyperammonemia  Cont current management  "

## 2024-01-08 NOTE — ASSESSMENT & PLAN NOTE
Pt presented to [facility] Detox unit with acute alcoholic hepatitis  Initial  and T bili 4.7  Pt was tx with supportive measures:  discriminant function >32 and steroids not indicated

## 2024-01-08 NOTE — PLAN OF CARE
Problem: Prexisting or High Potential for Compromised Skin Integrity  Goal: Skin integrity is maintained or improved  Description: INTERVENTIONS:  - Identify patients at risk for skin breakdown  - Assess and monitor skin integrity  - Assess and monitor nutrition and hydration status  - Monitor labs   - Assess for incontinence   - Turn and reposition patient  - Assist with mobility/ambulation  - Relieve pressure over bony prominences  - Avoid friction and shearing  - Provide appropriate hygiene as needed including keeping skin clean and dry  - Evaluate need for skin moisturizer/barrier cream  - Collaborate with interdisciplinary team   - Patient/family teaching  - Consider wound care consult   Outcome: Progressing     Problem: PAIN - ADULT  Goal: Verbalizes/displays adequate comfort level or baseline comfort level  Description: Interventions:  - Encourage patient to monitor pain and request assistance  - Assess pain using appropriate pain scale  - Administer analgesics based on type and severity of pain and evaluate response  - Implement non-pharmacological measures as appropriate and evaluate response  - Consider cultural and social influences on pain and pain management  - Notify physician/advanced practitioner if interventions unsuccessful or patient reports new pain  Outcome: Progressing     Problem: INFECTION - ADULT  Goal: Absence or prevention of progression during hospitalization  Description: INTERVENTIONS:  - Assess and monitor for signs and symptoms of infection  - Monitor lab/diagnostic results  - Monitor all insertion sites, i.e. indwelling lines, tubes, and drains  - Monitor endotracheal if appropriate and nasal secretions for changes in amount and color  - [place] appropriate cooling/warming therapies per order  - Administer medications as ordered  - Instruct and encourage patient and family to use good hand hygiene technique  - Identify and instruct in appropriate isolation precautions for  identified infection/condition  Outcome: Progressing  Goal: Absence of fever/infection during neutropenic period  Description: INTERVENTIONS:  - Monitor WBC    Outcome: Progressing     Problem: SAFETY ADULT  Goal: Patient will remain free of falls  Description: INTERVENTIONS:  - Educate patient/family on patient safety including physical limitations  - Instruct patient to call for assistance with activity   - Consult OT/PT to assist with strengthening/mobility   - Keep Call bell within reach  - Keep bed low and locked with side rails adjusted as appropriate  - Keep care items and personal belongings within reach  - Initiate and maintain comfort rounds  - Make Fall Risk Sign visible to staff  - Offer Toileting every 2 Hours, in advance of need  - Initiate/Maintain bed alarm  - Obtain necessary fall risk management equipment  - Apply yellow socks and bracelet for high fall risk patients  - Consider moving patient to room near nurses station  Outcome: Progressing  Goal: Maintain or return to baseline ADL function  Description: INTERVENTIONS:  -  Assess patient's ability to carry out ADLs; assess patient's baseline for ADL function and identify physical deficits which impact ability to perform ADLs (bathing, care of mouth/teeth, toileting, grooming, dressing, etc.)  - Assess/evaluate cause of self-care deficits   - Assess range of motion  - Assess patient's mobility; develop plan if impaired  - Assess patient's need for assistive devices and provide as appropriate  - Encourage maximum independence but intervene and supervise when necessary  - Involve family in performance of ADLs  - Assess for home care needs following discharge   - Consider OT consult to assist with ADL evaluation and planning for discharge  - Provide patient education as appropriate  Outcome: Progressing  Goal: Maintains/Returns to pre admission functional level  Description: INTERVENTIONS:  - Perform AM-PAC 6 Click Basic Mobility/ Daily Activity  assessment daily.  - Set and communicate daily mobility goal to care team and patient/family/caregiver.   - Collaborate with rehabilitation services on mobility goals if consulted  - Perform Range of Motion 3 times a day.  - Reposition patient every 2 hours.  - Dangle patient 3 times a day  - Stand patient 3 times a day  - Ambulate patient 3 times a day  - Out of bed to chair 3 times a day   - Out of bed for meals 3 times a day  - Out of bed for toileting  - Record patient progress and toleration of activity level   Outcome: Progressing     Problem: DISCHARGE PLANNING  Goal: Discharge to home or other facility with appropriate resources  Description: INTERVENTIONS:  - Identify barriers to discharge w/patient and caregiver  - Arrange for needed discharge resources and transportation as appropriate  - Identify discharge learning needs (meds, wound care, etc.)  - Arrange for interpretive services to assist at discharge as needed  - Refer to Case Management Department for coordinating discharge planning if the patient needs post-hospital services based on physician/advanced practitioner order or complex needs related to functional status, cognitive ability, or social support system  Outcome: Progressing     Problem: Knowledge Deficit  Goal: Patient/family/caregiver demonstrates understanding of disease process, treatment plan, medications, and discharge instructions  Description: Complete learning assessment and assess knowledge base.  Interventions:  - Provide teaching at level of understanding  - Provide teaching via preferred learning methods  Outcome: Progressing     Problem: Nutrition/Hydration-ADULT  Goal: Nutrient/Hydration intake appropriate for improving, restoring or maintaining nutritional needs  Description: Monitor and assess patient's nutrition/hydration status for malnutrition. Collaborate with interdisciplinary team and initiate plan and interventions as ordered.  Monitor patient's weight and dietary  intake as ordered or per policy. Utilize nutrition screening tool and intervene as necessary. Determine patient's food preferences and provide high-protein, high-caloric foods as appropriate.     INTERVENTIONS:  - Monitor oral intake, urinary output, labs, and treatment plans  - Assess nutrition and hydration status and recommend course of action  - Evaluate amount of meals eaten  - Assist patient with eating if necessary   - Allow adequate time for meals  - Recommend/ encourage appropriate diets, oral nutritional supplements, and vitamin/mineral supplements  - Order, calculate, and assess calorie counts as needed  - Recommend, monitor, and adjust tube feedings and TPN/PPN based on assessed needs  - Assess need for intravenous fluids  - Provide specific nutrition/hydration education as appropriate  - Include patient/family/caregiver in decisions related to nutrition  Outcome: Progressing     Problem: NEUROSENSORY - ADULT  Goal: Achieves stable or improved neurological status  Description: INTERVENTIONS  - Monitor and report changes in neurological status  - Monitor vital signs such as temperature, blood pressure, glucose, and any other labs ordered   - Initiate measures to prevent increased intracranial pressure  - Monitor for seizure activity and implement precautions if appropriate      Outcome: Progressing  Goal: Achieves maximal functionality and self care  Description: INTERVENTIONS  - Monitor swallowing and airway patency with patient fatigue and changes in neurological status  - Encourage and assist patient to increase activity and self care.   - Encourage visually impaired, hearing impaired and aphasic patients to use assistive/communication devices  Outcome: Progressing     Problem: GASTROINTESTINAL - ADULT  Goal: Minimal or absence of nausea and/or vomiting  Description: INTERVENTIONS:  - Administer IV fluids if ordered to ensure adequate hydration  - Maintain NPO status until nausea and vomiting are  resolved  - Nasogastric tube if ordered  - Administer ordered antiemetic medications as needed  - Provide nonpharmacologic comfort measures as appropriate  - Advance diet as tolerated, if ordered  - Consider nutrition services referral to assist patient with adequate nutrition and appropriate food choices  Outcome: Progressing  Goal: Maintains adequate nutritional intake  Description: INTERVENTIONS:  - Monitor percentage of each meal consumed  - Identify factors contributing to decreased intake, treat as appropriate  - Assist with meals as needed  - Monitor I&O, weight, and lab values if indicated  - Obtain nutrition services referral as needed  Outcome: Progressing  Goal: Oral mucous membranes remain intact  Description: INTERVENTIONS  - Assess oral mucosa and hygiene practices  - Implement preventative oral hygiene regimen  - Implement oral medicated treatments as ordered  - Initiate Nutrition services referral as needed  Outcome: Progressing     Problem: GENITOURINARY - ADULT  Goal: Absence of urinary retention  Description: INTERVENTIONS:  - Assess patient’s ability to void and empty bladder  - Monitor I/O  - Bladder scan as needed  - Discuss with physician/AP medications to alleviate retention as needed  - Discuss catheterization for long term situations as appropriate  Outcome: Progressing     Problem: METABOLIC, FLUID AND ELECTROLYTES - ADULT  Goal: Electrolytes maintained within normal limits  Description: INTERVENTIONS:  - Monitor labs and assess patient for signs and symptoms of electrolyte imbalances  - Administer electrolyte replacement as ordered  - Monitor response to electrolyte replacements, including repeat lab results as appropriate  - Instruct patient on fluid and nutrition as appropriate  Outcome: Progressing  Goal: Fluid balance maintained  Description: INTERVENTIONS:  - Monitor labs   - Monitor I/O and WT  - Instruct patient on fluid and nutrition as appropriate  - Assess for signs & symptoms  of volume excess or deficit  Outcome: Progressing     Problem: SKIN/TISSUE INTEGRITY - ADULT  Goal: Skin Integrity remains intact(Skin Breakdown Prevention)  Description: Assess:  -Perform Leon assessment every shift  -Clean and moisturize skin every shift  -Inspect skin when repositioning, toileting, and assisting with ADLS  -Assess under medical devices  -Assess extremities for adequate circulation and sensation     Bed Management:  -Have minimal linens on bed & keep smooth, unwrinkled  -Change linens as needed when moist or perspiring  -Avoid sitting or lying in one position for more than 2 hours while in bed  -Keep HOB at 30degrees     Toileting:  -Offer bedside commode  -Assess for incontinence every 2 hours  -Use incontinent care products after each incontinent episode     Activity:  -Mobilize patient 3 times a day  -Encourage activity and walks on unit  -Encourage or provide ROM exercises   -Turn and reposition patient every 2 Hours  -Use appropriate equipment to lift or move patient in bed  -Instruct/ Assist with weight shifting every hour when out of bed in chair  -Consider limitation of chair time 1 hour intervals    Skin Care:  -Avoid use of baby powder, tape, friction and shearing, hot water or constrictive clothing  -Relieve pressure over bony prominences using pillows  -Do not massage red bony areas    Next Steps:  -Teach patient strategies to minimize risks such as frequent repositioning   -Consider consults to  interdisciplinary teams such as nutrition  Outcome: Progressing     Problem: MUSCULOSKELETAL - ADULT  Goal: Maintain or return mobility to safest level of function  Description: INTERVENTIONS:  - Assess patient's ability to carry out ADLs; assess patient's baseline for ADL function and identify physical deficits which impact ability to perform ADLs (bathing, care of mouth/teeth, toileting, grooming, dressing, etc.)  - Assess/evaluate cause of self-care deficits   - Assess range of motion  -  Assess patient's mobility  - Assess patient's need for assistive devices and provide as appropriate  - Encourage maximum independence but intervene and supervise when necessary  - Involve family in performance of ADLs  - Assess for home care needs following discharge   - Consider OT consult to assist with ADL evaluation and planning for discharge  - Provide patient education as appropriate  Outcome: Progressing

## 2024-01-08 NOTE — PROGRESS NOTES
Progress Note -  [facility]  Leslye Holland 48 y.o. female MRN: [id number]  Unit/Bed#: E4 -01 Encounter: [id number]      ASSESSMENT AND PLAN:      48-year-old female with history significant for alcohol abuse, alcohol withdrawal, UC currently not on any maintenance therapy, and prior history of Lori-Tavera tear, who was transferred from [facility] due to concern for hematemesis.    On admission, patient was found to have elevated LFTs concerning for likely acute alcoholic hepatitis given pattern of LFT elevation.  However her Madrey's discriminant function was less than 32 which demonstrates a lack of indication for initiation of steroids. She has a known history of hepatic steatosis which was initially noted on imaging in 2020.  There is possible imaging this admission shows increased severity of hepatic steatosis in the setting of acute alcoholic hepatitis. Strict alcohol cessation was discussed with her along with the need for outpatient follow-up in the hepatology clinic for further workup of severe hepatic steatosis noted on imaging.    On presentation her hemoglobin was initially 10.5 and has since gradually down-trended to 7.2 today.  Overall liver enzymes continue to improve today.  Further liver workup is currently in process.  No further evidence of hematemesis or overt signs of GI blood loss.  Iron studies obtained this admission are consistent with anemia of chronic disease likely in the setting of her liver disease.  However, given admission for hematemesis and downtrending hemoglobin, we will plan for upper endoscopy for further evaluation.  Differential diagnosis includes oozing from known PHG, Lori-Tavera tear setting of active alcohol abuse, severe esophagitis, gastritis, duodenitis, PUD.    Will plan for EGD given hematemesis on admission downtrending hemoglobin.  Continue PPI twice daily.  Follow-up acute liver workup.  Continue to  monitor liver enzymes.  Counseled on strict alcohol cessation.  Outpatient follow-up in the hepatology clinic with ultrasound elastography for stratification of her liver fibrosis.    Rest of care per primary team.  ______________________________________________________________________    Subjective:  Denies any acute complaints. No recurrent hematemesis.    REVIEW OF SYSTEMS:  10 point ROS reviewed and negative except otherwise noted in the HPI above.     Historical Information   History reviewed. No pertinent past medical history.  Past Surgical History:   Procedure Laterality Date    KNEE SURGERY       Social History   Social History     Substance and Sexual Activity   Alcohol Use Yes    Comment: Fifth of vodka and 12 tall boys     Social History     Substance and Sexual Activity   Drug Use Never     Social History     Tobacco Use   Smoking Status Every Day    Current packs/day: 0.25    Average packs/day: 0.3 packs/day for 6.0 years (1.5 ttl pk-yrs)    Types: Cigarettes    Start date: 1/1/2018   Smokeless Tobacco Never     History reviewed. No pertinent family history.    Meds/Allergies     No medications prior to admission.     Current Facility-Administered Medications   Medication Dose Route Frequency    cefTRIAXone (ROCEPHIN) IVPB (premix in dextrose) 1,000 mg 50 mL  1,000 mg Intravenous Q24H    chlorhexidine (PERIDEX) 0.12 % oral rinse 15 mL  15 mL Mouth/Throat Q12H VARGAS    dextrose 5 % and sodium chloride 0.9 % with KCl 20 mEq/L infusion (premix)  75 mL/hr Intravenous Continuous    diphenhydramine, lidocaine, Al/Mg hydroxide, simethicone (Magic Mouthwash) oral solution 10 mL  10 mL Swish & Spit Q4H PRN    folic acid (FOLVITE) tablet 1 mg  1 mg Oral Daily    lactulose (CHRONULAC) oral solution 30 g  30 g Oral TID    multivitamin-minerals (CENTRUM) tablet 1 tablet  1 tablet Oral Daily    nicotine (NICODERM CQ) 14 mg/24hr TD 24 hr patch 14 mg  14 mg Transdermal Daily    octreotide (SandoSTATIN) 500 mcg in sodium  "chloride 0.9 % 250 mL infusion  25 mcg/hr Intravenous Continuous    ondansetron (ZOFRAN) injection 4 mg  4 mg Intravenous Q6H PRN    pantoprazole (PROTONIX) injection 40 mg  40 mg Intravenous Q12H VARGAS    rifaximin (XIFAXAN) tablet 550 mg  550 mg Oral Q12H VARGAS    thiamine (VITAMIN B1) 250 mg in sodium chloride 0.9 % 50 mL IVPB  250 mg Intravenous Daily       Allergies   Allergen Reactions    Acetaminophen Other (See Comments)     Other reaction(s): LIVER ISSUES    Aspirin GI Intolerance    Codeine Hives    Cyclobenzaprine Hives    Ketorolac Tromethamine Hives    Lamotrigine Other (See Comments)     lower extremity pain    Methocarbamol Hives    Nsaids Other (See Comments)     GI upset:Toradol=allergy    Tramadol Hives     Other reaction(s): rash         Objective     Blood pressure 99/64, pulse 90, temperature (!) 96.9 °F (36.1 °C), temperature source Temporal, resp. rate 18, height 5' 2\" (1.575 m), weight 45.7 kg (100 lb 12 oz), SpO2 100%. Body mass index is 18.43 kg/m².    PHYSICAL EXAM:    General: chronically ill-appearing, NAD  Eyes: no conjunctival icterus or pallor  Abdominal: Soft, non-tender, non-distended    Lab Results:   No results displayed because visit has over 200 results.        Imaging Studies: I have personally reviewed pertinent imaging studies.    Lorraine Son D.O.  Fellow, PGY-5  Division of Gastroenterology & Hepatology  [facility]  1/8/2024 10:38 AM     "

## 2024-01-08 NOTE — ASSESSMENT & PLAN NOTE
Pt is a 47 yo female with PMH significant for alcohol abuse, hepatic steatosis, and UC who was transferred for [facility] detox unit to [facility] ICU on 1/5 for hematemesis    Pt was evaluated by GI team  CT scan with evidence of varices and portal HTN  Currently on IV Protonix, octreotide infusion, and Rocephin  Plan for EGD in am  Pt has associated ABLA and hypotension and will be transfused 1u PRBC as well.
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
## 2024-01-08 NOTE — PLAN OF CARE
Problem: Prexisting or High Potential for Compromised Skin Integrity  Goal: Skin integrity is maintained or improved  Description: INTERVENTIONS:  - Identify patients at risk for skin breakdown  - Assess and monitor skin integrity  - Assess and monitor nutrition and hydration status  - Monitor labs   - Assess for incontinence   - Turn and reposition patient  - Assist with mobility/ambulation  - Relieve pressure over bony prominences  - Avoid friction and shearing  - Provide appropriate hygiene as needed including keeping skin clean and dry  - Evaluate need for skin moisturizer/barrier cream  - Collaborate with interdisciplinary team   - Patient/family teaching  - Consider wound care consult   Outcome: Progressing     Problem: PAIN - ADULT  Goal: Verbalizes/displays adequate comfort level or baseline comfort level  Description: Interventions:  - Encourage patient to monitor pain and request assistance  - Assess pain using appropriate pain scale  - Administer analgesics based on type and severity of pain and evaluate response  - Implement non-pharmacological measures as appropriate and evaluate response  - Consider cultural and social influences on pain and pain management  - Notify physician/advanced practitioner if interventions unsuccessful or patient reports new pain  Outcome: Progressing     Problem: INFECTION - ADULT  Goal: Absence or prevention of progression during hospitalization  Description: INTERVENTIONS:  - Assess and monitor for signs and symptoms of infection  - Monitor lab/diagnostic results  - Monitor all insertion sites, i.e. indwelling lines, tubes, and drains  - Monitor endotracheal if appropriate and nasal secretions for changes in amount and color  - [place] appropriate cooling/warming therapies per order  - Administer medications as ordered  - Instruct and encourage patient and family to use good hand hygiene technique  - Identify and instruct in appropriate isolation precautions for  identified infection/condition  Outcome: Progressing  Goal: Absence of fever/infection during neutropenic period  Description: INTERVENTIONS:  - Monitor WBC    Outcome: Progressing     Problem: SAFETY ADULT  Goal: Patient will remain free of falls  Description: INTERVENTIONS:  - Educate patient/family on patient safety including physical limitations  - Instruct patient to call for assistance with activity   - Consult OT/PT to assist with strengthening/mobility   - Keep Call bell within reach  - Keep bed low and locked with side rails adjusted as appropriate  - Keep care items and personal belongings within reach  - Initiate and maintain comfort rounds  - Make Fall Risk Sign visible to staff  - Offer Toileting every 2 Hours, in advance of need  - Initiate/Maintain bed alarm  - Obtain necessary fall risk management equipment: alarm   - Apply yellow socks and bracelet for high fall risk patients  - Consider moving patient to room near nurses station  Outcome: Progressing  Goal: Maintain or return to baseline ADL function  Description: INTERVENTIONS:  -  Assess patient's ability to carry out ADLs; assess patient's baseline for ADL function and identify physical deficits which impact ability to perform ADLs (bathing, care of mouth/teeth, toileting, grooming, dressing, etc.)  - Assess/evaluate cause of self-care deficits   - Assess range of motion  - Assess patient's mobility; develop plan if impaired  - Assess patient's need for assistive devices and provide as appropriate  - Encourage maximum independence but intervene and supervise when necessary  - Involve family in performance of ADLs  - Assess for home care needs following discharge   - Consider OT consult to assist with ADL evaluation and planning for discharge  - Provide patient education as appropriate  Outcome: Progressing  Goal: Maintains/Returns to pre admission functional level  Description: INTERVENTIONS:  - Perform AM-PAC 6 Click Basic Mobility/ Daily  Activity assessment daily.  - Set and communicate daily mobility goal to care team and patient/family/caregiver.   - Collaborate with rehabilitation services on mobility goals if consulted  - Perform Range of Motion 3 times a day.  - Reposition patient every 2 hours.  - Dangle patient 3 times a day  - Stand patient 3 times a day  - Ambulate patient 3 times a day  - Out of bed to chair 3 times a day   - Out of bed for meals 3 times a day  - Out of bed for toileting  - Record patient progress and toleration of activity level   Outcome: Progressing     Problem: DISCHARGE PLANNING  Goal: Discharge to home or other facility with appropriate resources  Description: INTERVENTIONS:  - Identify barriers to discharge w/patient and caregiver  - Arrange for needed discharge resources and transportation as appropriate  - Identify discharge learning needs (meds, wound care, etc.)  - Arrange for interpretive services to assist at discharge as needed  - Refer to Case Management Department for coordinating discharge planning if the patient needs post-hospital services based on physician/advanced practitioner order or complex needs related to functional status, cognitive ability, or social support system  Outcome: Progressing     Problem: Knowledge Deficit  Goal: Patient/family/caregiver demonstrates understanding of disease process, treatment plan, medications, and discharge instructions  Description: Complete learning assessment and assess knowledge base.  Interventions:  - Provide teaching at level of understanding  - Provide teaching via preferred learning methods  Outcome: Progressing     Problem: Nutrition/Hydration-ADULT  Goal: Nutrient/Hydration intake appropriate for improving, restoring or maintaining nutritional needs  Description: Monitor and assess patient's nutrition/hydration status for malnutrition. Collaborate with interdisciplinary team and initiate plan and interventions as ordered.  Monitor patient's weight and  dietary intake as ordered or per policy. Utilize nutrition screening tool and intervene as necessary. Determine patient's food preferences and provide high-protein, high-caloric foods as appropriate.     INTERVENTIONS:  - Monitor oral intake, urinary output, labs, and treatment plans  - Assess nutrition and hydration status and recommend course of action  - Evaluate amount of meals eaten  - Assist patient with eating if necessary   - Allow adequate time for meals  - Recommend/ encourage appropriate diets, oral nutritional supplements, and vitamin/mineral supplements  - Order, calculate, and assess calorie counts as needed  - Recommend, monitor, and adjust tube feedings and TPN/PPN based on assessed needs  - Assess need for intravenous fluids  - Provide specific nutrition/hydration education as appropriate  - Include patient/family/caregiver in decisions related to nutrition  Outcome: Progressing     Problem: NEUROSENSORY - ADULT  Goal: Achieves stable or improved neurological status  Description: INTERVENTIONS  - Monitor and report changes in neurological status  - Monitor vital signs such as temperature, blood pressure, glucose, and any other labs ordered   - Initiate measures to prevent increased intracranial pressure  - Monitor for seizure activity and implement precautions if appropriate      Outcome: Progressing  Goal: Achieves maximal functionality and self care  Description: INTERVENTIONS  - Monitor swallowing and airway patency with patient fatigue and changes in neurological status  - Encourage and assist patient to increase activity and self care.   - Encourage visually impaired, hearing impaired and aphasic patients to use assistive/communication devices  Outcome: Progressing     Problem: GASTROINTESTINAL - ADULT  Goal: Minimal or absence of nausea and/or vomiting  Description: INTERVENTIONS:  - Administer IV fluids if ordered to ensure adequate hydration  - Maintain NPO status until nausea and vomiting  are resolved  - Nasogastric tube if ordered  - Administer ordered antiemetic medications as needed  - Provide nonpharmacologic comfort measures as appropriate  - Advance diet as tolerated, if ordered  - Consider nutrition services referral to assist patient with adequate nutrition and appropriate food choices  Outcome: Progressing  Goal: Maintains adequate nutritional intake  Description: INTERVENTIONS:  - Monitor percentage of each meal consumed  - Identify factors contributing to decreased intake, treat as appropriate  - Assist with meals as needed  - Monitor I&O, weight, and lab values if indicated  - Obtain nutrition services referral as needed  Outcome: Progressing  Goal: Oral mucous membranes remain intact  Description: INTERVENTIONS  - Assess oral mucosa and hygiene practices  - Implement preventative oral hygiene regimen  - Implement oral medicated treatments as ordered  - Initiate Nutrition services referral as needed  Outcome: Progressing     Problem: GENITOURINARY - ADULT  Goal: Absence of urinary retention  Description: INTERVENTIONS:  - Assess patient’s ability to void and empty bladder  - Monitor I/O  - Bladder scan as needed  - Discuss with physician/AP medications to alleviate retention as needed  - Discuss catheterization for long term situations as appropriate  Outcome: Progressing     Problem: METABOLIC, FLUID AND ELECTROLYTES - ADULT  Goal: Electrolytes maintained within normal limits  Description: INTERVENTIONS:  - Monitor labs and assess patient for signs and symptoms of electrolyte imbalances  - Administer electrolyte replacement as ordered  - Monitor response to electrolyte replacements, including repeat lab results as appropriate  - Instruct patient on fluid and nutrition as appropriate  Outcome: Progressing  Goal: Fluid balance maintained  Description: INTERVENTIONS:  - Monitor labs   - Monitor I/O and WT  - Instruct patient on fluid and nutrition as appropriate  - Assess for signs &  symptoms of volume excess or deficit  Outcome: Progressing     Problem: SKIN/TISSUE INTEGRITY - ADULT  Goal: Skin Integrity remains intact(Skin Breakdown Prevention)  Description: Assess:  -Perform Leon assessment every   -Clean and moisturize skin every   -Inspect skin when repositioning, toileting, and assisting with ADLS  -Assess under medical devices such as  every   -Assess extremities for adequate circulation and sensation     Bed Management:  -Have minimal linens on bed & keep smooth, unwrinkled  -Change linens as needed when moist or perspiring  -Avoid sitting or lying in one position for more than  hours while in bed  -Keep HOB at degrees     Toileting:  -Offer bedside commode  -Assess for incontinence every   -Use incontinent care products after each incontinent episode such as     Activity:  -Mobilize patient  times a day  -Encourage activity and walks on unit  -Encourage or provide ROM exercises   -Turn and reposition patient every  Hours  -Use appropriate equipment to lift or move patient in bed  -Instruct/ Assist with weight shifting every  when out of bed in chair  -Consider limitation of chair time  hour intervals    Skin Care:  -Avoid use of baby powder, tape, friction and shearing, hot water or constrictive clothing  -Relieve pressure over bony prominences using   -Do not massage red bony areas    Next Steps:  -Teach patient strategies to minimize risks such as    -Consider consults to  interdisciplinary teams such as   Outcome: Progressing     Problem: MUSCULOSKELETAL - ADULT  Goal: Maintain or return mobility to safest level of function  Description: INTERVENTIONS:  - Assess patient's ability to carry out ADLs; assess patient's baseline for ADL function and identify physical deficits which impact ability to perform ADLs (bathing, care of mouth/teeth, toileting, grooming, dressing, etc.)  - Assess/evaluate cause of self-care deficits   - Assess range of motion  - Assess patient's mobility  -  Assess patient's need for assistive devices and provide as appropriate  - Encourage maximum independence but intervene and supervise when necessary  - Involve family in performance of ADLs  - Assess for home care needs following discharge   - Consider OT consult to assist with ADL evaluation and planning for discharge  - Provide patient education as appropriate  Outcome: Progressing

## 2024-01-08 NOTE — PLAN OF CARE
Problem: Nutrition/Hydration-ADULT  Goal: Nutrient/Hydration intake appropriate for improving, restoring or maintaining nutritional needs  Description: Monitor and assess patient's nutrition/hydration status for malnutrition. Collaborate with interdisciplinary team and initiate plan and interventions as ordered.  Monitor patient's weight and dietary intake as ordered or per policy. Utilize nutrition screening tool and intervene as necessary. Determine patient's food preferences and provide high-protein, high-caloric foods as appropriate.     INTERVENTIONS:  - Monitor oral intake, urinary output, labs, and treatment plans  - Assess nutrition and hydration status and recommend course of action  - Evaluate amount of meals eaten  - Assist patient with eating if necessary   - Allow adequate time for meals  - Recommend/ encourage appropriate diets, oral nutritional supplements, and vitamin/mineral supplements  - Order, calculate, and assess calorie counts as needed  - Recommend, monitor, and adjust tube feedings and TPN/PPN based on assessed needs  - Assess need for intravenous fluids  - Provide specific nutrition/hydration education as appropriate  - Include patient/family/caregiver in decisions related to nutrition  Outcome: Not Progressing

## 2024-01-08 NOTE — UTILIZATION REVIEW
Continued Stay Review    Date: 1/7/24                          Current Patient Class: IP  Current Level of Care: from ICU to MS on 1/7    HPI:48 y.o. female initially admitted on 1/5 as transfer from IP detox to IP for hematemesis, E coli UTI, alcoholic hepatitis, hepatic encephalopathy,  hyponatremia, malnutrition, Alcohol withdrawal tx.       Assessment/Plan:   To MS from ICU today.  Pt is on room air but still with tachycardia and soft BP.  + E coli UTI. H/H from 8.1 to 7.7 today.  Remains on IV PPI, Octreotide drip, antibiotics.  Plan for EGD - time TBD.   K and calcium are low today.  IV KCL in IV fluids.  LFTs and T bili downtrending, ammonia uptrending.  On exam she is ill-appearing and toxic-appreaing, normal breath sound, no c/o pain, mental status at baseline.      Vital Signs:   01/07/24 2330 98.2 °F (36.8 °C) 92 20 80/61 Abnormal  66 100 % None (Room air) Lying   01/07/24 1915 -- -- -- -- -- -- None (Room air) --   01/07/24 1100 -- 86 -- 102/65 -- -- -- --   01/07/24 0820 97.6 °F (36.4 °C) 86 20 96/68 -- 98 % None (Room air) Lying   01/07/24 0550 96.4 °F (35.8 °C) Abnormal  93 20 101/65 78 100 % None (Room air) Lying   01/07/24 0500 -- 84 23 Abnormal  -- -- 100 % -- --   01/07/24 0400 -- 86 25 Abnormal  91/62 70 99 % None (Room air) Lying   01/07/24 0300 -- 88 20 -- -- 100 % -- --   01/07/24 0200 -- 86 21 -- -- 100 % -- --   01/07/24 0100 -- 84 25 Abnormal  94/63 69 98 % None (Room air) Lying   01/07/24 0000 -- 90 50 Abnormal  94/63 -- 98 % -- --   01/06/24 2322 97.5 °F (36.4 °C) 94 18 91/61 67 100 % None (Room air) Lying   01/06/24 2300 -- 88 23 Abnormal  -- -- 99 % -- --   01/06/24 2200 -- 90 22 -- -- 100 % -- --   01/06/24 2100 -- 94 22 -- -- 100 % -- --   01/06/24 2000 -- 82 19 102/70 72 100 % None (Room air) Lying   01/06/24 1950 97.6 °F (36.4 °C) -- -- -- -- -- -- --   01/06/24 1900 -- 88 23 Abnormal  -- -- 98 % -- --   01/06/24 1610 -- 96 -- 95/61 -- -- -- --   01/06/24 1600 -- 92 -- -- -- -- --  --   01/06/24 1537 97.5 °F (36.4 °C) 104 29 Abnormal  -- -- 100 % -- --   01/06/24 1515 -- 104 32 Abnormal  122/86 98 100 % -- --   01/06/24 1430 -- 96 31 Abnormal  110/73 85 100 % -- --   01/06/24 1315 -- 96 -- 84/63 Abnormal  -- -- -- --   01/06/24 1215 -- 102 20 95/64 78 100 % -- --   01/06/24 1151 99.6 °F (37.6 °C) -- -- -- -- -- -- --   01/06/24 1115 -- 100 14 88/59 Abnormal  68 100 % -- --   01/06/24 1015 -- 100 30 Abnormal  98/74 79 99 % -- --   01/06/24 0815 -- 90 18 86/68 Abnormal  81 100 % -- --   01/06/24 0725 98 °F (36.7 °C) 98 16 85/73 Abnormal  78 100 % -- --   01/06/24 0600 98.1 °F (36.7 °C) 96 19 -- -- 100 % -- --   01/06/24 0533 -- 94 20 93/69 76 100 % -- --   01/06/24 0530 -- 92 19 93/69 76 100 % -- --   01/06/24 0500 -- 104 27 Abnormal  -- -- 98 % -- --   01/06/24 0400 -- 98 17 -- -- 100 % -- --   01/06/24 0330 -- 100 25 Abnormal  87/64 Abnormal  71 100 % -- --   01/06/24 0315 -- 96 21 80/59 Abnormal  64 Abnormal  100 % -- --   01/06/24 0300 -- 90 22 71/46 Abnormal  52 Abnormal  100 % -- --   01/06/24 0200 -- 94 17 82/60 Abnormal  69 100 % -- --   01/06/24 0100 -- 88 19 82/55 Abnormal  62 Abnormal  100 % -- --   01/06/24 0030 -- 90 20 83/57 Abnormal  64 Abnormal  100 % -- --   01/06/24 0015 -- 102 27 Abnormal  -- 119 100 % -- --   01/06/24 0000 97.9 °F (36.6 °C) 92 17 70/44 Abnormal  51 Abnormal  100 % None (Room air) --       Pertinent Labs/Diagnostic Results:       Results from last 7 days   Lab Units 01/07/24  1800 01/07/24  0228 01/06/24 2027 01/06/24  0857 01/06/24  0430 01/06/24  0203 01/05/24 2010 01/05/24  0800 01/04/24  1253   WBC Thousand/uL 6.41  --   --   --  9.27  --  8.04   < > 7.69   HEMOGLOBIN g/dL 7.7* 8.1* 7.8*   < > 9.1*   < > 9.2*   < > 10.5*   HEMATOCRIT % 23.0* 24.4* 26.2*   < > 26.3*   < > 26.2*   < > 28.8*   PLATELETS Thousands/uL 116*  --   --   --  136*  --  109*   < > 117*   NEUTROS ABS Thousands/µL  --   --   --   --   --   --  5.24  --  5.54   BANDS PCT %  --   --    --   --  7  --   --   --   --     < > = values in this interval not displayed.         Results from last 7 days   Lab Units 01/07/24  1800 01/07/24  1634 01/06/24 2027 01/06/24  1312 01/06/24  0857   SODIUM mmol/L 136 137 135 141 138   POTASSIUM mmol/L 2.9* 3.2* 3.3* 3.4* 3.5   CHLORIDE mmol/L 112* 112* 113* 114* 114*   CO2 mmol/L 15* 16* 13* 14* 16*   ANION GAP mmol/L 9 9 9 13 8   BUN mg/dL 3* 3* 3* 3* 3*   CREATININE mg/dL 0.46* 0.49* 0.37* 0.38* 0.34*   EGFR ml/min/1.73sq m 118 116 127 126 131   CALCIUM mg/dL 7.4* 7.4* 7.2* 7.0* 7.2*   MAGNESIUM mg/dL  --  1.4* 1.5* 1.7* 1.8*   PHOSPHORUS mg/dL  --  1.9* 2.1* 3.8 3.1     Results from last 7 days   Lab Units 01/07/24  1634 01/06/24  0525 01/06/24  0430 01/05/24 2010 01/05/24  1104 01/05/24  0800   AST U/L 58*  --  69*  70* 78*  --  114*   ALT U/L 20  --  26  26 29  --  34   ALK PHOS U/L 148*  --  218*  218* 222*  --  266*   TOTAL PROTEIN g/dL 5.2*  --  5.8*  5.7* 6.0*  --  7.2   ALBUMIN g/dL 2.7*  --  2.9*  2.9* 3.1*  --  3.6   TOTAL BILIRUBIN mg/dL 2.74*  --  4.50*  4.50* 4.37*  --  4.80*   BILIRUBIN DIRECT mg/dL  --   --  2.78*  2.79*  --   --   --    AMMONIA umol/L 186* 99*  --   --  178*  --          Results from last 7 days   Lab Units 01/07/24  1800 01/07/24  1634 01/06/24 2027 01/06/24  1312 01/06/24  0857 01/06/24  0203 01/05/24 2010 01/05/24  1400 01/05/24  0800 01/05/24  0107   GLUCOSE RANDOM mg/dL 162* 160* 126 97 93 116 103 117 131  133 97             BETA-HYDROXYBUTYRATE   Date Value Ref Range Status   01/04/2024 2.8 (H) <0.6 mmol/L Final      Results from last 7 days   Lab Units 01/07/24  1634 01/06/24  0430   PROTIME seconds 16.1* 15.5*   INR  1.27* 1.21*             Results from last 7 days   Lab Units 01/06/24  1317   LACTIC ACID mmol/L 0.8     Results from last 7 days   Lab Units 01/06/24  1148   FERRITIN ng/mL 650*   IRON ug/dL 75   TIBC ug/dL <130*     Results from last 7 days   Lab Units 01/05/24 2010 01/04/24  1253   LIPASE u/L  <6* <6*       Results from last 7 days   Lab Units 01/05/24  1236   CLARITY UA  Slightly Cloudy*   COLOR UA  Antonina*   SPEC GRAV UA  1.010   PH UA  8.0   GLUCOSE UA mg/dl Negative   KETONES UA mg/dl Negative   BLOOD UA  Negative   PROTEIN UA mg/dl Negative   NITRITE UA  Negative   BILIRUBIN UA  Negative   UROBILINOGEN UA mg/dL 1.0   LEUKOCYTES UA  25.0*   WBC UA /hpf 0-1   RBC UA /hpf None Seen   BACTERIA UA /hpf Moderate*   EPITHELIAL CELLS WET PREP /hpf None Seen     Results from last 7 days   Lab Units 01/04/24  1253   ETHANOL LVL mg/dL 80*       Results from last 7 days   Lab Units 01/05/24  1236   URINE CULTURE  >100,000 cfu/ml Escherichia coli*  >100,000 cfu/ml Enterococcus faecalis*     Medications:   Scheduled Medications:  cefTRIAXone, 1,000 mg, Intravenous, Q24H  chlorhexidine, 15 mL, Mouth/Throat, Q12H VARGAS  folic acid, 1 mg, Oral, Daily  lactulose, 30 g, Oral, TID  multivitamin-minerals, 1 tablet, Oral, Daily  nicotine, 14 mg, Transdermal, Daily  pantoprazole, 40 mg, Intravenous, Q12H VARGAS  rifaximin, 550 mg, Oral, Q12H VARGAS  thiamine, 250 mg, Intravenous, Daily      Continuous IV Infusions:  dextrose 5 % and sodium chloride 0.9 % with KCl 20 mEq/L, 75 mL/hr, Intravenous, Continuous  octreotide, 25 mcg/hr, Intravenous, Continuous      PRN Meds:  diphenhydramine, lidocaine, Al/Mg hydroxide, simethicone, 10 mL, Swish & Spit, Q4H PRN  ondansetron, 4 mg, Intravenous, Q6H PRN    Discharge Plan: [facility]    Network Utilization Review Department  ATTENTION: Please call with any questions or concerns to [phone] and carefully listen to the prompts so that you are directed to the right person. All voicemails are confidential.   For Discharge needs, contact Care Management DC Support Team at [phone] opt. [phone]  Send all requests for admission clinical reviews, approved or denied determinations and any other requests to dedicated fax number below belonging to the campus where the patient is receiving treatment. List  of dedicated fax numbers for the Facilities:  FACILITY NAME UR FAX NUMBER   ADMISSION DENIALS (Administrative/Medical Necessity) [phone]   DISCHARGE SUPPORT TEAM (NETWORK) [phone]   PARENT CHILD HEALTH (Maternity/NICU/Pediatrics) [phone]   [facility] [phone]   [facility] [phone]   [facility] [phone]   [facility] [phone]   [facility] [phone]   [facility] [phone]   [facility] [phone]   [facility] [phone]   [facility] [phone]   [facility] [phone]   [facility] [phone]

## 2024-01-08 NOTE — ASSESSMENT & PLAN NOTE
Patient was initially admitted to [facility] on 1/4 and was treated with SEWS protocol with phenobarbital  She was transferred to [facility] ICU for evaluation of GI bleed and was started on serax that was discontinued 1/7 due to somnolence  Cont CIWA protocol  Cont thiamine/folate
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
## 2024-01-08 NOTE — ASSESSMENT & PLAN NOTE
Pt with hypokalemia, hypomagnesemia  On lactulose for hepatic encephalopathy  Cont to replete as needed  Also replete hypocalcemia (when adjusted for hypoalbuminemia)
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
## 2024-01-08 NOTE — PLAN OF CARE
Problem: PHYSICAL THERAPY ADULT  Goal: Performs mobility at highest level of function for planned discharge setting.  See evaluation for individualized goals.  Description: Treatment/Interventions: LE strengthening/ROM, Elevations, Therapeutic exercise, Endurance training, Bed mobility, Gait training, Spoke to nursing, OT  Equipment Recommended: Walker (pt does not own)       See flowsheet documentation for full assessment, interventions and recommendations.  Note: Prognosis: Good  Problem List: Decreased strength, Decreased endurance, Impaired balance, Decreased mobility, Decreased cognition, Decreased safety awareness  Assessment: Pt is 48 y.o. female seen for PT evaluation s/p admit to [facility] on 1/5/2024 w/ Hematemesis. PT consulted to assess pt's functional mobility and d/c needs. Pt was at detox unit at  with alcohol withdrawal and transferred to [facility] for higher level of care. Co morbidities affecting pt's physical performance at time of assessment include: alcohol abuse, hypotension, hypokalemia, hepatic steatosis, and osteopenia. PTA, pt was I with all ADL's/IADL/s, but (-) for driving. Pt lives with son and father and is not currently working. Please find objective findings from PT assessment regarding body systems outlined above with impairments and limitations including weakness, impaired balance, decreased endurance, gait deviations, decreased activity tolerance, decreased functional mobility tolerance, impaired judgement, and fall risk. Pt was able to perform bed mobility and supine<>sit with S. She has had persistent hypotensive episodes, but did not report any dizziness t/o session. Pt was able to take 4 steps with RW needing MinAx1. Refer to flowsheet for BP readings. Pt was able to ambulate 2 steps to the commode and perform toilet transfer with MinAx1 with cues for proper hand placement and proper use of RW. Pt to benefit from continued PT tx to address deficits as defined above and  maximize level of functional independent mobility and consistency.  The patient's AM-PAC Basic Mobility Inpatient Short Form Raw Score is 19. A Raw score of greater than 16 suggests the patient may benefit from discharge to home. At this time, PT recommends post-acute rehab services at level II due to above deficits and CLOF. Will follow up with pt 3-5x/wk to address current deficits. Please also refer to the recommendation of the Physical Therapist for safe discharge planning. Based on pt presentation and impaired function, pt would benefit from level II, (moderate resource intensity) at D/C.        Rehab Resource Intensity Level, PT: II (Moderate Resource Intensity)    See flowsheet documentation for full assessment.

## 2024-01-08 NOTE — ASSESSMENT & PLAN NOTE
Likely combination of poor oral intake, low solute intake, excessive free water due to beer potomania  Was followed by Nephrology while at [facility]  Since normalized

## 2024-01-08 NOTE — ASSESSMENT & PLAN NOTE
Pt has h/o chronic anemia, likely due to anemia of chronic disease with baseline Hb appros 10-11  Pt developed ABLA in the setting of hematemesis and Hb dropped to 7.2  Pt with associated hypotension  Will transfuse 1u PRBC  GI evaluating UGI bleed:  EGD planned

## 2024-01-09 ENCOUNTER — ANESTHESIA (INPATIENT)
Dept: GASTROENTEROLOGY | Facility: HOSPITAL | Age: 49
End: 2024-01-09
Payer: COMMERCIAL

## 2024-01-09 ENCOUNTER — APPOINTMENT (INPATIENT)
Dept: GASTROENTEROLOGY | Facility: HOSPITAL | Age: 49
DRG: 253 | End: 2024-01-09
Attending: STUDENT IN AN ORGANIZED HEALTH CARE EDUCATION/TRAINING PROGRAM
Payer: COMMERCIAL

## 2024-01-09 PROBLEM — E43 SEVERE PROTEIN-CALORIE MALNUTRITION (HCC): Status: ACTIVE | Noted: 2024-01-09

## 2024-01-09 LAB
ABO GROUP BLD BPU: NORMAL
ACTIN IGG SERPL-ACNC: 12 UNITS (ref 0–19)
ALBUMIN SERPL BCP-MCNC: 3 G/DL (ref 3.5–5)
ALP SERPL-CCNC: 127 U/L (ref 34–104)
ALT SERPL W P-5'-P-CCNC: 15 U/L (ref 7–52)
ANION GAP SERPL CALCULATED.3IONS-SCNC: 7 MMOL/L
AST SERPL W P-5'-P-CCNC: 41 U/L (ref 13–39)
BASOPHILS # BLD AUTO: 0.07 THOUSANDS/ÂΜL (ref 0–0.1)
BASOPHILS NFR BLD AUTO: 1 % (ref 0–1)
BILIRUB SERPL-MCNC: 3.8 MG/DL (ref 0.2–1)
BPU ID: NORMAL
BUN SERPL-MCNC: 2 MG/DL (ref 5–25)
CALCIUM ALBUM COR SERPL-MCNC: 8.2 MG/DL (ref 8.3–10.1)
CALCIUM SERPL-MCNC: 7.4 MG/DL (ref 8.4–10.2)
CHLORIDE SERPL-SCNC: 112 MMOL/L (ref 96–108)
CO2 SERPL-SCNC: 16 MMOL/L (ref 21–32)
CREAT SERPL-MCNC: 0.34 MG/DL (ref 0.6–1.3)
CROSSMATCH: NORMAL
EOSINOPHIL # BLD AUTO: 0.13 THOUSAND/ÂΜL (ref 0–0.61)
EOSINOPHIL NFR BLD AUTO: 2 % (ref 0–6)
ERYTHROCYTE [DISTWIDTH] IN BLOOD BY AUTOMATED COUNT: 15.9 % (ref 11.6–15.1)
GFR SERPL CREATININE-BSD FRML MDRD: 131 ML/MIN/1.73SQ M
GLUCOSE SERPL-MCNC: 131 MG/DL (ref 65–140)
HCT VFR BLD AUTO: 26.8 % (ref 34.8–46.1)
HGB BLD-MCNC: 9.2 G/DL (ref 11.5–15.4)
IMM GRANULOCYTES # BLD AUTO: 0.05 THOUSAND/UL (ref 0–0.2)
IMM GRANULOCYTES NFR BLD AUTO: 1 % (ref 0–2)
INR PPP: 1.49 (ref 0.84–1.19)
LYMPHOCYTES # BLD AUTO: 1.92 THOUSANDS/ÂΜL (ref 0.6–4.47)
LYMPHOCYTES NFR BLD AUTO: 23 % (ref 14–44)
MAGNESIUM SERPL-MCNC: 1.4 MG/DL (ref 1.9–2.7)
MCH RBC QN AUTO: 32.2 PG (ref 26.8–34.3)
MCHC RBC AUTO-ENTMCNC: 34.3 G/DL (ref 31.4–37.4)
MCV RBC AUTO: 94 FL (ref 82–98)
MITOCHONDRIA M2 IGG SER-ACNC: <20 UNITS (ref 0–20)
MONOCYTES # BLD AUTO: 0.85 THOUSAND/ÂΜL (ref 0.17–1.22)
MONOCYTES NFR BLD AUTO: 10 % (ref 4–12)
NEUTROPHILS # BLD AUTO: 5.5 THOUSANDS/ÂΜL (ref 1.85–7.62)
NEUTS SEG NFR BLD AUTO: 63 % (ref 43–75)
NRBC BLD AUTO-RTO: 0 /100 WBCS
PHOSPHATE SERPL-MCNC: 2 MG/DL (ref 2.7–4.5)
PLATELET # BLD AUTO: 125 THOUSANDS/UL (ref 149–390)
PMV BLD AUTO: 11.4 FL (ref 8.9–12.7)
POTASSIUM SERPL-SCNC: 3.2 MMOL/L (ref 3.5–5.3)
PROT SERPL-MCNC: 4.9 G/DL (ref 6.4–8.4)
PROTHROMBIN TIME: 18.2 SECONDS (ref 11.6–14.5)
RBC # BLD AUTO: 2.86 MILLION/UL (ref 3.81–5.12)
SODIUM SERPL-SCNC: 135 MMOL/L (ref 135–147)
UNIT DISPENSE STATUS: NORMAL
UNIT PRODUCT CODE: NORMAL
UNIT PRODUCT VOLUME: 350 ML
UNIT RH: NORMAL
WBC # BLD AUTO: 8.52 THOUSAND/UL (ref 4.31–10.16)

## 2024-01-09 PROCEDURE — 99232 SBSQ HOSP IP/OBS MODERATE 35: CPT | Performed by: INTERNAL MEDICINE

## 2024-01-09 PROCEDURE — 80053 COMPREHEN METABOLIC PANEL: CPT | Performed by: INTERNAL MEDICINE

## 2024-01-09 PROCEDURE — 85025 COMPLETE CBC W/AUTO DIFF WBC: CPT | Performed by: INTERNAL MEDICINE

## 2024-01-09 PROCEDURE — C9113 INJ PANTOPRAZOLE SODIUM, VIA: HCPCS

## 2024-01-09 PROCEDURE — 43235 EGD DIAGNOSTIC BRUSH WASH: CPT | Performed by: STUDENT IN AN ORGANIZED HEALTH CARE EDUCATION/TRAINING PROGRAM

## 2024-01-09 PROCEDURE — 85610 PROTHROMBIN TIME: CPT | Performed by: INTERNAL MEDICINE

## 2024-01-09 PROCEDURE — 83735 ASSAY OF MAGNESIUM: CPT | Performed by: INTERNAL MEDICINE

## 2024-01-09 PROCEDURE — 0DJ08ZZ INSPECTION OF UPPER INTESTINAL TRACT, VIA NATURAL OR ARTIFICIAL OPENING ENDOSCOPIC: ICD-10-PCS | Performed by: STUDENT IN AN ORGANIZED HEALTH CARE EDUCATION/TRAINING PROGRAM

## 2024-01-09 PROCEDURE — 84100 ASSAY OF PHOSPHORUS: CPT | Performed by: INTERNAL MEDICINE

## 2024-01-09 RX ORDER — PROPOFOL 10 MG/ML
INJECTION, EMULSION INTRAVENOUS AS NEEDED
Status: DISCONTINUED | OUTPATIENT
Start: 2024-01-09 | End: 2024-01-09

## 2024-01-09 RX ORDER — LIDOCAINE HYDROCHLORIDE 20 MG/ML
INJECTION, SOLUTION EPIDURAL; INFILTRATION; INTRACAUDAL; PERINEURAL AS NEEDED
Status: DISCONTINUED | OUTPATIENT
Start: 2024-01-09 | End: 2024-01-09

## 2024-01-09 RX ORDER — SODIUM CHLORIDE 9 MG/ML
125 INJECTION, SOLUTION INTRAVENOUS CONTINUOUS
Status: CANCELLED | OUTPATIENT
Start: 2024-01-09

## 2024-01-09 RX ORDER — MAGNESIUM SULFATE HEPTAHYDRATE 40 MG/ML
2 INJECTION, SOLUTION INTRAVENOUS ONCE
Status: COMPLETED | OUTPATIENT
Start: 2024-01-09 | End: 2024-01-09

## 2024-01-09 RX ORDER — PHYTONADIONE 5 MG/1
10 TABLET ORAL DAILY
Status: DISCONTINUED | OUTPATIENT
Start: 2024-01-09 | End: 2024-01-09

## 2024-01-09 RX ORDER — PHYTONADIONE 5 MG/1
10 TABLET ORAL DAILY
Status: COMPLETED | OUTPATIENT
Start: 2024-01-10 | End: 2024-01-10

## 2024-01-09 RX ORDER — LORAZEPAM 2 MG/ML
0.5 INJECTION INTRAMUSCULAR ONCE
Status: COMPLETED | OUTPATIENT
Start: 2024-01-09 | End: 2024-01-09

## 2024-01-09 RX ORDER — SODIUM CHLORIDE 9 MG/ML
INJECTION, SOLUTION INTRAVENOUS CONTINUOUS PRN
Status: DISCONTINUED | OUTPATIENT
Start: 2024-01-09 | End: 2024-01-09

## 2024-01-09 RX ADMIN — LIDOCAINE HYDROCHLORIDE 100 MG: 20 INJECTION, SOLUTION EPIDURAL; INFILTRATION; INTRACAUDAL at 15:14

## 2024-01-09 RX ADMIN — POTASSIUM PHOSPHATE, MONOBASIC POTASSIUM PHOSPHATE, DIBASIC 21 MMOL: 224; 236 INJECTION, SOLUTION, CONCENTRATE INTRAVENOUS at 11:51

## 2024-01-09 RX ADMIN — PROPOFOL 120 MG: 10 INJECTION, EMULSION INTRAVENOUS at 15:14

## 2024-01-09 RX ADMIN — DEXTROSE, SODIUM CHLORIDE, AND POTASSIUM CHLORIDE 75 ML/HR: 5; .9; .15 INJECTION INTRAVENOUS at 01:15

## 2024-01-09 RX ADMIN — DEXTROSE, SODIUM CHLORIDE, AND POTASSIUM CHLORIDE 75 ML/HR: 5; .9; .15 INJECTION INTRAVENOUS at 23:42

## 2024-01-09 RX ADMIN — CALCIUM CARBONATE 500 MG: 500 TABLET, CHEWABLE ORAL at 16:22

## 2024-01-09 RX ADMIN — FOLIC ACID 1 MG: 1 TABLET ORAL at 08:04

## 2024-01-09 RX ADMIN — Medication 1 TABLET: at 08:03

## 2024-01-09 RX ADMIN — CEFTRIAXONE 1000 MG: 1 INJECTION, SOLUTION INTRAVENOUS at 12:28

## 2024-01-09 RX ADMIN — PHYTONADIONE 10 MG: 5 TABLET ORAL at 12:30

## 2024-01-09 RX ADMIN — SODIUM CHLORIDE: 0.9 INJECTION, SOLUTION INTRAVENOUS at 14:40

## 2024-01-09 RX ADMIN — CALCIUM CARBONATE 500 MG: 500 TABLET, CHEWABLE ORAL at 08:03

## 2024-01-09 RX ADMIN — THIAMINE HYDROCHLORIDE 250 MG: 100 INJECTION, SOLUTION INTRAMUSCULAR; INTRAVENOUS at 08:25

## 2024-01-09 RX ADMIN — MAGNESIUM SULFATE HEPTAHYDRATE 2 G: 40 INJECTION, SOLUTION INTRAVENOUS at 09:45

## 2024-01-09 RX ADMIN — RIFAXIMIN 550 MG: 550 TABLET ORAL at 09:45

## 2024-01-09 RX ADMIN — SODIUM CHLORIDE: 0.9 INJECTION, SOLUTION INTRAVENOUS at 15:06

## 2024-01-09 RX ADMIN — PANTOPRAZOLE SODIUM 40 MG: 40 INJECTION, POWDER, FOR SOLUTION INTRAVENOUS at 08:04

## 2024-01-09 RX ADMIN — RIFAXIMIN 550 MG: 550 TABLET ORAL at 21:04

## 2024-01-09 RX ADMIN — LORAZEPAM 0.5 MG: 2 INJECTION INTRAMUSCULAR; INTRAVENOUS at 13:51

## 2024-01-09 RX ADMIN — PANTOPRAZOLE SODIUM 40 MG: 40 INJECTION, POWDER, FOR SOLUTION INTRAVENOUS at 21:04

## 2024-01-09 NOTE — ASSESSMENT & PLAN NOTE
Bp decreased to 78/53:  hypotension likely due to ABLA  Hb decreased to 7.2 with ABLA from hematemesis  Transfuse 1u PRBC  Pt was also give ivf and iv albumin

## 2024-01-09 NOTE — ASSESSMENT & PLAN NOTE
"Pt has h/o hepatic steatosis, likley due to alcohol abuse  RUQ US with \"severe hepatic steatosis.  Coexisting fibrotic or inflammatory changes not excluded\"  Suspect developing cirrhosis due to   Varices and portal HTN on CT  Coagulopathy: INR approx 1.2--1.4  Thrombocytopenia  Hepatic encephalopathy/hyperammonemia  Cont current management, alcohol cessation and rehab referrals  Per gi: outpt elastography  "
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
## 2024-01-09 NOTE — ASSESSMENT & PLAN NOTE
Pt is a 49 yo female with PMH significant for alcohol abuse, hepatic steatosis, and UC who was transferred for [facility] detox unit to [facility] ICU on 1/5 for hematemesis    Pt was evaluated by GI team and initially placed on octreotide infusion  CT scan with evidence of varices and portal HTN  Currently on IV Protonix, and Rocephin.  Octreotide drip discontinued  Plan for EGD today  Pt has associated ABLA and hypotension and was transfused 1u PRBC as well.

## 2024-01-09 NOTE — ANESTHESIA PREPROCEDURE EVALUATION
Procedure:  EGD    Relevant Problems   GI/HEPATIC   (+) Alcoholic hepatitis   (+) Hematemesis   (+) Hepatic steatosis      HEMATOLOGY   (+) Acute blood loss anemia      (+) tobacco use     (+) alcohol abuse    (+) CT scan with evidence of varices and portal HTN     (+) alcoholic ketoacidosis    Physical Exam    Airway  Comment: Small mouth opening  Mallampati score: III  TM Distance: >3 FB  Neck ROM: full     Dental   No notable dental hx     Cardiovascular  Rhythm: regular, Rate: normal, Cardiovascular exam normal    Pulmonary  Comment: Bilateral chest rise, not using accessory muscles for breathing, Pulmonary exam normal Breath sounds clear to auscultation    Other Findings  post-pubertal.      Anesthesia Plan  ASA Score- 3 Emergent    Anesthesia Type- general with ASA Monitors.         Additional Monitors:     Airway Plan:            Plan Factors-    Chart reviewed.   Existing labs reviewed. Patient summary reviewed.          There is medical exclusion for perioperative obstructive sleep apnea risk education.        Induction- intravenous.    Postoperative Plan-     Informed Consent- Anesthetic plan and risks discussed with patient.

## 2024-01-09 NOTE — ASSESSMENT & PLAN NOTE
Patient significantly encephalopathic, lethargic with asterixis on exam  Patient currently under thiamine protocol for concern for Warnicke's  Ammonia level noted to be 178 on admission  Cont lactulose and Xifaxan  Mentation confounded by alcohol withdrawal and serax in ICU  Pt still with confusion, lethargy:  cont current tx and supportive measures

## 2024-01-09 NOTE — PLAN OF CARE
Problem: Prexisting or High Potential for Compromised Skin Integrity  Goal: Skin integrity is maintained or improved  Description: INTERVENTIONS:  - Identify patients at risk for skin breakdown  - Assess and monitor skin integrity  - Assess and monitor nutrition and hydration status  - Monitor labs   - Assess for incontinence   - Turn and reposition patient  - Assist with mobility/ambulation  - Relieve pressure over bony prominences  - Avoid friction and shearing  - Provide appropriate hygiene as needed including keeping skin clean and dry  - Evaluate need for skin moisturizer/barrier cream  - Collaborate with interdisciplinary team   - Patient/family teaching  - Consider wound care consult   Outcome: Progressing     Problem: PAIN - ADULT  Goal: Verbalizes/displays adequate comfort level or baseline comfort level  Description: Interventions:  - Encourage patient to monitor pain and request assistance  - Assess pain using appropriate pain scale  - Administer analgesics based on type and severity of pain and evaluate response  - Implement non-pharmacological measures as appropriate and evaluate response  - Consider cultural and social influences on pain and pain management  - Notify physician/advanced practitioner if interventions unsuccessful or patient reports new pain  Outcome: Progressing     Problem: INFECTION - ADULT  Goal: Absence or prevention of progression during hospitalization  Description: INTERVENTIONS:  - Assess and monitor for signs and symptoms of infection  - Monitor lab/diagnostic results  - Monitor all insertion sites, i.e. indwelling lines, tubes, and drains  - Monitor endotracheal if appropriate and nasal secretions for changes in amount and color  - [place] appropriate cooling/warming therapies per order  - Administer medications as ordered  - Instruct and encourage patient and family to use good hand hygiene technique  - Identify and instruct in appropriate isolation precautions for  identified infection/condition  Outcome: Progressing  Goal: Absence of fever/infection during neutropenic period  Description: INTERVENTIONS:  - Monitor WBC    Outcome: Progressing     Problem: SAFETY ADULT  Goal: Patient will remain free of falls  Description: INTERVENTIONS:  - Educate patient/family on patient safety including physical limitations  - Instruct patient to call for assistance with activity   - Consult OT/PT to assist with strengthening/mobility   - Keep Call bell within reach  - Keep bed low and locked with side rails adjusted as appropriate  - Keep care items and personal belongings within reach  - Initiate and maintain comfort rounds  - Make Fall Risk Sign visible to staff  - Offer Toileting every 2 Hours, in advance of need  - Initiate/Maintain bed alarm  - Obtain necessary fall risk management equipment  - Apply yellow socks and bracelet for high fall risk patients  - Consider moving patient to room near nurses station  Outcome: Progressing  Goal: Maintain or return to baseline ADL function  Description: INTERVENTIONS:  -  Assess patient's ability to carry out ADLs; assess patient's baseline for ADL function and identify physical deficits which impact ability to perform ADLs (bathing, care of mouth/teeth, toileting, grooming, dressing, etc.)  - Assess/evaluate cause of self-care deficits   - Assess range of motion  - Assess patient's mobility; develop plan if impaired  - Assess patient's need for assistive devices and provide as appropriate  - Encourage maximum independence but intervene and supervise when necessary  - Involve family in performance of ADLs  - Assess for home care needs following discharge   - Consider OT consult to assist with ADL evaluation and planning for discharge  - Provide patient education as appropriate  Outcome: Progressing  Goal: Maintains/Returns to pre admission functional level  Description: INTERVENTIONS:  - Perform AM-PAC 6 Click Basic Mobility/ Daily Activity  assessment daily.  - Set and communicate daily mobility goal to care team and patient/family/caregiver.   - Collaborate with rehabilitation services on mobility goals if consulted  - Perform Range of Motion 3 times a day.  - Reposition patient every 2 hours.  - Dangle patient 3 times a day  - Stand patient 3 times a day  - Ambulate patient 3 times a day  - Out of bed to chair 3 times a day   - Out of bed for meals 3 times a day  - Out of bed for toileting  - Record patient progress and toleration of activity level   Outcome: Progressing     Problem: DISCHARGE PLANNING  Goal: Discharge to home or other facility with appropriate resources  Description: INTERVENTIONS:  - Identify barriers to discharge w/patient and caregiver  - Arrange for needed discharge resources and transportation as appropriate  - Identify discharge learning needs (meds, wound care, etc.)  - Arrange for interpretive services to assist at discharge as needed  - Refer to Case Management Department for coordinating discharge planning if the patient needs post-hospital services based on physician/advanced practitioner order or complex needs related to functional status, cognitive ability, or social support system  Outcome: Progressing     Problem: Knowledge Deficit  Goal: Patient/family/caregiver demonstrates understanding of disease process, treatment plan, medications, and discharge instructions  Description: Complete learning assessment and assess knowledge base.  Interventions:  - Provide teaching at level of understanding  - Provide teaching via preferred learning methods  Outcome: Progressing     Problem: Nutrition/Hydration-ADULT  Goal: Nutrient/Hydration intake appropriate for improving, restoring or maintaining nutritional needs  Description: Monitor and assess patient's nutrition/hydration status for malnutrition. Collaborate with interdisciplinary team and initiate plan and interventions as ordered.  Monitor patient's weight and dietary  intake as ordered or per policy. Utilize nutrition screening tool and intervene as necessary. Determine patient's food preferences and provide high-protein, high-caloric foods as appropriate.     INTERVENTIONS:  - Monitor oral intake, urinary output, labs, and treatment plans  - Assess nutrition and hydration status and recommend course of action  - Evaluate amount of meals eaten  - Assist patient with eating if necessary   - Allow adequate time for meals  - Recommend/ encourage appropriate diets, oral nutritional supplements, and vitamin/mineral supplements  - Order, calculate, and assess calorie counts as needed  - Recommend, monitor, and adjust tube feedings and TPN/PPN based on assessed needs  - Assess need for intravenous fluids  - Provide specific nutrition/hydration education as appropriate  - Include patient/family/caregiver in decisions related to nutrition  Outcome: Progressing     Problem: NEUROSENSORY - ADULT  Goal: Achieves stable or improved neurological status  Description: INTERVENTIONS  - Monitor and report changes in neurological status  - Monitor vital signs such as temperature, blood pressure, glucose, and any other labs ordered   - Initiate measures to prevent increased intracranial pressure  - Monitor for seizure activity and implement precautions if appropriate      Outcome: Progressing  Goal: Achieves maximal functionality and self care  Description: INTERVENTIONS  - Monitor swallowing and airway patency with patient fatigue and changes in neurological status  - Encourage and assist patient to increase activity and self care.   - Encourage visually impaired, hearing impaired and aphasic patients to use assistive/communication devices  Outcome: Progressing     Problem: GASTROINTESTINAL - ADULT  Goal: Minimal or absence of nausea and/or vomiting  Description: INTERVENTIONS:  - Administer IV fluids if ordered to ensure adequate hydration  - Maintain NPO status until nausea and vomiting are  resolved  - Nasogastric tube if ordered  - Administer ordered antiemetic medications as needed  - Provide nonpharmacologic comfort measures as appropriate  - Advance diet as tolerated, if ordered  - Consider nutrition services referral to assist patient with adequate nutrition and appropriate food choices  Outcome: Progressing  Goal: Maintains adequate nutritional intake  Description: INTERVENTIONS:  - Monitor percentage of each meal consumed  - Identify factors contributing to decreased intake, treat as appropriate  - Assist with meals as needed  - Monitor I&O, weight, and lab values if indicated  - Obtain nutrition services referral as needed  Outcome: Progressing  Goal: Oral mucous membranes remain intact  Description: INTERVENTIONS  - Assess oral mucosa and hygiene practices  - Implement preventative oral hygiene regimen  - Implement oral medicated treatments as ordered  - Initiate Nutrition services referral as needed  Outcome: Progressing     Problem: GENITOURINARY - ADULT  Goal: Absence of urinary retention  Description: INTERVENTIONS:  - Assess patient’s ability to void and empty bladder  - Monitor I/O  - Bladder scan as needed  - Discuss with physician/AP medications to alleviate retention as needed  - Discuss catheterization for long term situations as appropriate  Outcome: Progressing     Problem: METABOLIC, FLUID AND ELECTROLYTES - ADULT  Goal: Electrolytes maintained within normal limits  Description: INTERVENTIONS:  - Monitor labs and assess patient for signs and symptoms of electrolyte imbalances  - Administer electrolyte replacement as ordered  - Monitor response to electrolyte replacements, including repeat lab results as appropriate  - Instruct patient on fluid and nutrition as appropriate  Outcome: Progressing  Goal: Fluid balance maintained  Description: INTERVENTIONS:  - Monitor labs   - Monitor I/O and WT  - Instruct patient on fluid and nutrition as appropriate  - Assess for signs & symptoms  of volume excess or deficit  Outcome: Progressing     Problem: SKIN/TISSUE INTEGRITY - ADULT  Goal: Skin Integrity remains intact(Skin Breakdown Prevention)  Description: Assess:  -Perform Leon assessment every shift  -Clean and moisturize skin every shift  -Inspect skin when repositioning, toileting, and assisting with ADLS  -Assess under medical devices   -Assess extremities for adequate circulation and sensation     Bed Management:  -Have minimal linens on bed & keep smooth, unwrinkled  -Change linens as needed when moist or perspiring  -Avoid sitting or lying in one position for more than 2 hours while in bed      Toileting:  -Offer bedside commode  -Assess for incontinence every shift  -Use incontinent care products after each incontinent episode     Activity:  -Mobilize patient 3 times a day  -Encourage activity and walks on unit  -Encourage or provide ROM exercises   -Turn and reposition patient every 2 Hours  -Use appropriate equipment to lift or move patient in bed  -Instruct/ Assist with weight shifting every 30 minutes when out of bed in chair  -Consider limitation of chair time 1 hour intervals    Skin Care:  -Avoid use of baby powder, tape, friction and shearing, hot water or constrictive clothing  -Relieve pressure over bony prominences   -Do not massage red bony areas    Next Steps:  -Teach patient strategies to minimize risks    -Consider consults to  interdisciplinary teams such as nutrition  Outcome: Progressing     Problem: MUSCULOSKELETAL - ADULT  Goal: Maintain or return mobility to safest level of function  Description: INTERVENTIONS:  - Assess patient's ability to carry out ADLs; assess patient's baseline for ADL function and identify physical deficits which impact ability to perform ADLs (bathing, care of mouth/teeth, toileting, grooming, dressing, etc.)  - Assess/evaluate cause of self-care deficits   - Assess range of motion  - Assess patient's mobility  - Assess patient's need for  assistive devices and provide as appropriate  - Encourage maximum independence but intervene and supervise when necessary  - Involve family in performance of ADLs  - Assess for home care needs following discharge   - Consider OT consult to assist with ADL evaluation and planning for discharge  - Provide patient education as appropriate  Outcome: Progressing

## 2024-01-09 NOTE — ASSESSMENT & PLAN NOTE
Malnutrition Findings:   Adult Malnutrition type: Chronic illness  Adult Degree of Malnutrition: Other severe protein calorie malnutrition  Malnutrition Characteristics: Muscle loss, Fat loss     360 Statement: severe protein calorie malnutrition related to inadequate protein intake and lack of nutrient-dense foods with excessive ETOH intake as evidenced by  consumption of 8-10 beers daily and hard liquor although she can not quantify how much liquor; severe muscle wasting(temporalis, pectoralis, fat loss (orbital fat pads, triceps), treated with TBD    BMI Findings:  Adult BMI Classifications: Underweight < 18.5      Body mass index is 18.43 kg/m².

## 2024-01-09 NOTE — ASSESSMENT & PLAN NOTE
Likely combination of poor oral intake, low solute intake, excessive free water due to beer potomania  Was followed by Nephrology while at [facility]  Since normalized

## 2024-01-09 NOTE — ASSESSMENT & PLAN NOTE
Patient was initially admitted to [facility] on 1/4 and was treated with SEWS protocol with phenobarbital  She was transferred to [facility] ICU for evaluation of GI bleed and was started on serax that was discontinued 1/7 due to somnolence  Cont CIWA protocol  Cont thiamine/folate   Pt received iv thiamine 500mg iv q8h x 2+ days, then 250mg IV daily-- today is day 3/5  Feeling anxious today-given extra dose of IV Ativan

## 2024-01-09 NOTE — ANESTHESIA POSTPROCEDURE EVALUATION
"Post-Op Assessment Note    CV Status:  Stable    Pain management: adequate       Mental Status:  Alert and awake   Hydration Status:  Euvolemic   PONV Controlled:  Controlled   Airway Patency:  Patent     Post Op Vitals Reviewed: Yes      Staff: Anesthesiologist, CRNA               BP      Temp      Pulse     Resp      SpO2      /68   Pulse 76   Temp 98.6 °F (37 °C) (Temporal)   Resp 20   Ht 5' 2\" (1.575 m)   Wt 45.7 kg (100 lb 12 oz)   SpO2 100%   BMI 18.43 kg/m²     "

## 2024-01-09 NOTE — ASSESSMENT & PLAN NOTE
Pt presented to [facility] Detox unit with acute alcoholic hepatitis  Initial  and T bili 4.7  Pt was tx with supportive measures:  discriminant function <32 and steroids not indicated  Repeated discriminant function today:  5-->steroids again not indicated

## 2024-01-09 NOTE — ASSESSMENT & PLAN NOTE
Pt with hypokalemia, hypomagnesemia and hypophosphatemia  On lactulose for hepatic encephalopathy:  contributing to hypokalemia  Cont to replete as needed  Also replete hypocalcemia (when adjusted for hypoalbuminemia)
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
## 2024-01-09 NOTE — PLAN OF CARE
Problem: Prexisting or High Potential for Compromised Skin Integrity  Goal: Skin integrity is maintained or improved  Description: INTERVENTIONS:  - Identify patients at risk for skin breakdown  - Assess and monitor skin integrity  - Assess and monitor nutrition and hydration status  - Monitor labs   - Assess for incontinence   - Turn and reposition patient  - Assist with mobility/ambulation  - Relieve pressure over bony prominences  - Avoid friction and shearing  - Provide appropriate hygiene as needed including keeping skin clean and dry  - Evaluate need for skin moisturizer/barrier cream  - Collaborate with interdisciplinary team   - Patient/family teaching  - Consider wound care consult   Outcome: Progressing     Problem: PAIN - ADULT  Goal: Verbalizes/displays adequate comfort level or baseline comfort level  Description: Interventions:  - Encourage patient to monitor pain and request assistance  - Assess pain using appropriate pain scale  - Administer analgesics based on type and severity of pain and evaluate response  - Implement non-pharmacological measures as appropriate and evaluate response  - Consider cultural and social influences on pain and pain management  - Notify physician/advanced practitioner if interventions unsuccessful or patient reports new pain  Outcome: Progressing     Problem: INFECTION - ADULT  Goal: Absence or prevention of progression during hospitalization  Description: INTERVENTIONS:  - Assess and monitor for signs and symptoms of infection  - Monitor lab/diagnostic results  - Monitor all insertion sites, i.e. indwelling lines, tubes, and drains  - Monitor endotracheal if appropriate and nasal secretions for changes in amount and color  - Echo appropriate cooling/warming therapies per order  - Administer medications as ordered  - Instruct and encourage patient and family to use good hand hygiene technique  - Identify and instruct in appropriate isolation precautions for  identified infection/condition  Outcome: Progressing  Goal: Absence of fever/infection during neutropenic period  Description: INTERVENTIONS:  - Monitor WBC    Outcome: Progressing     Problem: SAFETY ADULT  Goal: Patient will remain free of falls  Description: INTERVENTIONS:  - Educate patient/family on patient safety including physical limitations  - Instruct patient to call for assistance with activity   - Consult OT/PT to assist with strengthening/mobility   - Keep Call bell within reach  - Keep bed low and locked with side rails adjusted as appropriate  - Keep care items and personal belongings within reach  - Initiate and maintain comfort rounds  - Make Fall Risk Sign visible to staff  - Offer Toileting every 2 Hours, in advance of need  - Initiate/Maintain bed alarm  - Obtain necessary fall risk management equipment: alarm   - Apply yellow socks and bracelet for high fall risk patients  - Consider moving patient to room near nurses station  Outcome: Progressing  Goal: Maintain or return to baseline ADL function  Description: INTERVENTIONS:  -  Assess patient's ability to carry out ADLs; assess patient's baseline for ADL function and identify physical deficits which impact ability to perform ADLs (bathing, care of mouth/teeth, toileting, grooming, dressing, etc.)  - Assess/evaluate cause of self-care deficits   - Assess range of motion  - Assess patient's mobility; develop plan if impaired  - Assess patient's need for assistive devices and provide as appropriate  - Encourage maximum independence but intervene and supervise when necessary  - Involve family in performance of ADLs  - Assess for home care needs following discharge   - Consider OT consult to assist with ADL evaluation and planning for discharge  - Provide patient education as appropriate  Outcome: Progressing  Goal: Maintains/Returns to pre admission functional level  Description: INTERVENTIONS:  - Perform AM-PAC 6 Click Basic Mobility/ Daily  Activity assessment daily.  - Set and communicate daily mobility goal to care team and patient/family/caregiver.   - Collaborate with rehabilitation services on mobility goals if consulted  - Perform Range of Motion 3 times a day.  - Reposition patient every 2 hours.  - Dangle patient 3 times a day  - Stand patient 3 times a day  - Ambulate patient 3 times a day  - Out of bed to chair 3 times a day   - Out of bed for meals 3 times a day  - Out of bed for toileting  - Record patient progress and toleration of activity level   Outcome: Progressing     Problem: DISCHARGE PLANNING  Goal: Discharge to home or other facility with appropriate resources  Description: INTERVENTIONS:  - Identify barriers to discharge w/patient and caregiver  - Arrange for needed discharge resources and transportation as appropriate  - Identify discharge learning needs (meds, wound care, etc.)  - Arrange for interpretive services to assist at discharge as needed  - Refer to Case Management Department for coordinating discharge planning if the patient needs post-hospital services based on physician/advanced practitioner order or complex needs related to functional status, cognitive ability, or social support system  Outcome: Progressing     Problem: Knowledge Deficit  Goal: Patient/family/caregiver demonstrates understanding of disease process, treatment plan, medications, and discharge instructions  Description: Complete learning assessment and assess knowledge base.  Interventions:  - Provide teaching at level of understanding  - Provide teaching via preferred learning methods  Outcome: Progressing     Problem: Nutrition/Hydration-ADULT  Goal: Nutrient/Hydration intake appropriate for improving, restoring or maintaining nutritional needs  Description: Monitor and assess patient's nutrition/hydration status for malnutrition. Collaborate with interdisciplinary team and initiate plan and interventions as ordered.  Monitor patient's weight and  dietary intake as ordered or per policy. Utilize nutrition screening tool and intervene as necessary. Determine patient's food preferences and provide high-protein, high-caloric foods as appropriate.     INTERVENTIONS:  - Monitor oral intake, urinary output, labs, and treatment plans  - Assess nutrition and hydration status and recommend course of action  - Evaluate amount of meals eaten  - Assist patient with eating if necessary   - Allow adequate time for meals  - Recommend/ encourage appropriate diets, oral nutritional supplements, and vitamin/mineral supplements  - Order, calculate, and assess calorie counts as needed  - Recommend, monitor, and adjust tube feedings and TPN/PPN based on assessed needs  - Assess need for intravenous fluids  - Provide specific nutrition/hydration education as appropriate  - Include patient/family/caregiver in decisions related to nutrition  Outcome: Progressing     Problem: NEUROSENSORY - ADULT  Goal: Achieves stable or improved neurological status  Description: INTERVENTIONS  - Monitor and report changes in neurological status  - Monitor vital signs such as temperature, blood pressure, glucose, and any other labs ordered   - Initiate measures to prevent increased intracranial pressure  - Monitor for seizure activity and implement precautions if appropriate      Outcome: Progressing  Goal: Achieves maximal functionality and self care  Description: INTERVENTIONS  - Monitor swallowing and airway patency with patient fatigue and changes in neurological status  - Encourage and assist patient to increase activity and self care.   - Encourage visually impaired, hearing impaired and aphasic patients to use assistive/communication devices  Outcome: Progressing     Problem: GASTROINTESTINAL - ADULT  Goal: Minimal or absence of nausea and/or vomiting  Description: INTERVENTIONS:  - Administer IV fluids if ordered to ensure adequate hydration  - Maintain NPO status until nausea and vomiting  are resolved  - Nasogastric tube if ordered  - Administer ordered antiemetic medications as needed  - Provide nonpharmacologic comfort measures as appropriate  - Advance diet as tolerated, if ordered  - Consider nutrition services referral to assist patient with adequate nutrition and appropriate food choices  Outcome: Progressing  Goal: Maintains adequate nutritional intake  Description: INTERVENTIONS:  - Monitor percentage of each meal consumed  - Identify factors contributing to decreased intake, treat as appropriate  - Assist with meals as needed  - Monitor I&O, weight, and lab values if indicated  - Obtain nutrition services referral as needed  Outcome: Progressing  Goal: Oral mucous membranes remain intact  Description: INTERVENTIONS  - Assess oral mucosa and hygiene practices  - Implement preventative oral hygiene regimen  - Implement oral medicated treatments as ordered  - Initiate Nutrition services referral as needed  Outcome: Progressing     Problem: GENITOURINARY - ADULT  Goal: Absence of urinary retention  Description: INTERVENTIONS:  - Assess patient’s ability to void and empty bladder  - Monitor I/O  - Bladder scan as needed  - Discuss with physician/AP medications to alleviate retention as needed  - Discuss catheterization for long term situations as appropriate  Outcome: Progressing     Problem: METABOLIC, FLUID AND ELECTROLYTES - ADULT  Goal: Electrolytes maintained within normal limits  Description: INTERVENTIONS:  - Monitor labs and assess patient for signs and symptoms of electrolyte imbalances  - Administer electrolyte replacement as ordered  - Monitor response to electrolyte replacements, including repeat lab results as appropriate  - Instruct patient on fluid and nutrition as appropriate  Outcome: Progressing  Goal: Fluid balance maintained  Description: INTERVENTIONS:  - Monitor labs   - Monitor I/O and WT  - Instruct patient on fluid and nutrition as appropriate  - Assess for signs &  symptoms of volume excess or deficit  Outcome: Progressing     Problem: SKIN/TISSUE INTEGRITY - ADULT  Goal: Skin Integrity remains intact(Skin Breakdown Prevention)  Description: Assess:  -Perform Leon assessment every   -Clean and moisturize skin every   -Inspect skin when repositioning, toileting, and assisting with ADLS  -Assess under medical devices such as  every   -Assess extremities for adequate circulation and sensation     Bed Management:  -Have minimal linens on bed & keep smooth, unwrinkled  -Change linens as needed when moist or perspiring  -Avoid sitting or lying in one position for more than  hours while in bed  -Keep HOB at degrees     Toileting:  -Offer bedside commode  -Assess for incontinence every   -Use incontinent care products after each incontinent episode such as     Activity:  -Mobilize patient  times a day  -Encourage activity and walks on unit  -Encourage or provide ROM exercises   -Turn and reposition patient every  Hours  -Use appropriate equipment to lift or move patient in bed  -Instruct/ Assist with weight shifting every  when out of bed in chair  -Consider limitation of chair time  hour intervals    Skin Care:  -Avoid use of baby powder, tape, friction and shearing, hot water or constrictive clothing  -Relieve pressure over bony prominences using   -Do not massage red bony areas    Next Steps:  -Teach patient strategies to minimize risks such as    -Consider consults to  interdisciplinary teams such as   Outcome: Progressing     Problem: MUSCULOSKELETAL - ADULT  Goal: Maintain or return mobility to safest level of function  Description: INTERVENTIONS:  - Assess patient's ability to carry out ADLs; assess patient's baseline for ADL function and identify physical deficits which impact ability to perform ADLs (bathing, care of mouth/teeth, toileting, grooming, dressing, etc.)  - Assess/evaluate cause of self-care deficits   - Assess range of motion  - Assess patient's mobility  -  Assess patient's need for assistive devices and provide as appropriate  - Encourage maximum independence but intervene and supervise when necessary  - Involve family in performance of ADLs  - Assess for home care needs following discharge   - Consider OT consult to assist with ADL evaluation and planning for discharge  - Provide patient education as appropriate  Outcome: Progressing

## 2024-01-09 NOTE — ASSESSMENT & PLAN NOTE
Patient was initially admitted to [facility] on 1/4 and was treated with SEWS protocol with phenobarbital  She was transferred to [facility] ICU for evaluation of GI bleed and was started on serax that was discontinued 1/7 due to somnolence  Cont CIWA protocol  Cont thiamine/folate

## 2024-01-10 LAB
ALBUMIN SERPL BCP-MCNC: 2.8 G/DL (ref 3.5–5)
ALP SERPL-CCNC: 116 U/L (ref 34–104)
ALT SERPL W P-5'-P-CCNC: 13 U/L (ref 7–52)
ANION GAP SERPL CALCULATED.3IONS-SCNC: 12 MMOL/L
AST SERPL W P-5'-P-CCNC: 38 U/L (ref 13–39)
ATRIAL RATE: 78 BPM
BASOPHILS # BLD AUTO: 0.06 THOUSANDS/ÂΜL (ref 0–0.1)
BASOPHILS NFR BLD AUTO: 1 % (ref 0–1)
BILIRUB SERPL-MCNC: 2.75 MG/DL (ref 0.2–1)
BUN SERPL-MCNC: 2 MG/DL (ref 5–25)
CALCIUM ALBUM COR SERPL-MCNC: 8.4 MG/DL (ref 8.3–10.1)
CALCIUM SERPL-MCNC: 7.4 MG/DL (ref 8.4–10.2)
CHLORIDE SERPL-SCNC: 106 MMOL/L (ref 96–108)
CO2 SERPL-SCNC: 16 MMOL/L (ref 21–32)
CREAT SERPL-MCNC: 0.37 MG/DL (ref 0.6–1.3)
EOSINOPHIL # BLD AUTO: 0.09 THOUSAND/ÂΜL (ref 0–0.61)
EOSINOPHIL NFR BLD AUTO: 1 % (ref 0–6)
ERYTHROCYTE [DISTWIDTH] IN BLOOD BY AUTOMATED COUNT: 15.9 % (ref 11.6–15.1)
GFR SERPL CREATININE-BSD FRML MDRD: 127 ML/MIN/1.73SQ M
GLUCOSE SERPL-MCNC: 151 MG/DL (ref 65–140)
HCT VFR BLD AUTO: 25.3 % (ref 34.8–46.1)
HGB BLD-MCNC: 8.6 G/DL (ref 11.5–15.4)
IMM GRANULOCYTES # BLD AUTO: 0.04 THOUSAND/UL (ref 0–0.2)
IMM GRANULOCYTES NFR BLD AUTO: 1 % (ref 0–2)
INR PPP: 1.47 (ref 0.84–1.19)
LYMPHOCYTES # BLD AUTO: 2.06 THOUSANDS/ÂΜL (ref 0.6–4.47)
LYMPHOCYTES NFR BLD AUTO: 27 % (ref 14–44)
MAGNESIUM SERPL-MCNC: 1.3 MG/DL (ref 1.9–2.7)
MCH RBC QN AUTO: 31.7 PG (ref 26.8–34.3)
MCHC RBC AUTO-ENTMCNC: 34 G/DL (ref 31.4–37.4)
MCV RBC AUTO: 93 FL (ref 82–98)
MONOCYTES # BLD AUTO: 0.9 THOUSAND/ÂΜL (ref 0.17–1.22)
MONOCYTES NFR BLD AUTO: 12 % (ref 4–12)
NEUTROPHILS # BLD AUTO: 4.49 THOUSANDS/ÂΜL (ref 1.85–7.62)
NEUTS SEG NFR BLD AUTO: 58 % (ref 43–75)
NRBC BLD AUTO-RTO: 0 /100 WBCS
P AXIS: 49 DEGREES
PHOSPHATE SERPL-MCNC: 1.8 MG/DL (ref 2.7–4.5)
PLATELET # BLD AUTO: 147 THOUSANDS/UL (ref 149–390)
PMV BLD AUTO: 10.5 FL (ref 8.9–12.7)
POTASSIUM SERPL-SCNC: 2.8 MMOL/L (ref 3.5–5.3)
PR INTERVAL: 172 MS
PROT SERPL-MCNC: 4.7 G/DL (ref 6.4–8.4)
PROTHROMBIN TIME: 18.1 SECONDS (ref 11.6–14.5)
QRS AXIS: 106 DEGREES
QRSD INTERVAL: 84 MS
QT INTERVAL: 376 MS
QTC INTERVAL: 428 MS
RBC # BLD AUTO: 2.71 MILLION/UL (ref 3.81–5.12)
SODIUM SERPL-SCNC: 134 MMOL/L (ref 135–147)
T WAVE AXIS: 6 DEGREES
VENTRICULAR RATE: 78 BPM
WBC # BLD AUTO: 7.64 THOUSAND/UL (ref 4.31–10.16)

## 2024-01-10 PROCEDURE — 92610 EVALUATE SWALLOWING FUNCTION: CPT

## 2024-01-10 PROCEDURE — C9113 INJ PANTOPRAZOLE SODIUM, VIA: HCPCS

## 2024-01-10 PROCEDURE — 83735 ASSAY OF MAGNESIUM: CPT

## 2024-01-10 PROCEDURE — 80053 COMPREHEN METABOLIC PANEL: CPT

## 2024-01-10 PROCEDURE — 84100 ASSAY OF PHOSPHORUS: CPT

## 2024-01-10 PROCEDURE — 85610 PROTHROMBIN TIME: CPT

## 2024-01-10 PROCEDURE — 99232 SBSQ HOSP IP/OBS MODERATE 35: CPT | Performed by: PHYSICIAN ASSISTANT

## 2024-01-10 PROCEDURE — 93005 ELECTROCARDIOGRAM TRACING: CPT

## 2024-01-10 PROCEDURE — 85025 COMPLETE CBC W/AUTO DIFF WBC: CPT

## 2024-01-10 RX ORDER — LORAZEPAM 2 MG/ML
0.5 INJECTION INTRAMUSCULAR ONCE AS NEEDED
Status: DISCONTINUED | OUTPATIENT
Start: 2024-01-10 | End: 2024-01-10

## 2024-01-10 RX ORDER — POTASSIUM CHLORIDE 20 MEQ/1
40 TABLET, EXTENDED RELEASE ORAL ONCE
Status: COMPLETED | OUTPATIENT
Start: 2024-01-10 | End: 2024-01-10

## 2024-01-10 RX ORDER — LORAZEPAM 2 MG/ML
0.5 INJECTION INTRAMUSCULAR ONCE AS NEEDED
Status: COMPLETED | OUTPATIENT
Start: 2024-01-10 | End: 2024-01-10

## 2024-01-10 RX ORDER — MAGNESIUM SULFATE HEPTAHYDRATE 40 MG/ML
2 INJECTION, SOLUTION INTRAVENOUS 2 TIMES DAILY
Status: COMPLETED | OUTPATIENT
Start: 2024-01-10 | End: 2024-01-10

## 2024-01-10 RX ADMIN — PANTOPRAZOLE SODIUM 40 MG: 40 INJECTION, POWDER, FOR SOLUTION INTRAVENOUS at 08:44

## 2024-01-10 RX ADMIN — PHYTONADIONE 10 MG: 5 TABLET ORAL at 08:47

## 2024-01-10 RX ADMIN — RIFAXIMIN 550 MG: 550 TABLET ORAL at 08:46

## 2024-01-10 RX ADMIN — Medication 1 TABLET: at 08:46

## 2024-01-10 RX ADMIN — LACTULOSE 30 G: 20 SOLUTION ORAL at 21:13

## 2024-01-10 RX ADMIN — PANTOPRAZOLE SODIUM 40 MG: 40 INJECTION, POWDER, FOR SOLUTION INTRAVENOUS at 21:13

## 2024-01-10 RX ADMIN — THIAMINE HYDROCHLORIDE 250 MG: 100 INJECTION, SOLUTION INTRAMUSCULAR; INTRAVENOUS at 08:51

## 2024-01-10 RX ADMIN — RIFAXIMIN 550 MG: 550 TABLET ORAL at 21:13

## 2024-01-10 RX ADMIN — LORAZEPAM 0.5 MG: 2 INJECTION INTRAMUSCULAR; INTRAVENOUS at 11:30

## 2024-01-10 RX ADMIN — MAGNESIUM SULFATE HEPTAHYDRATE 2 G: 40 INJECTION, SOLUTION INTRAVENOUS at 21:13

## 2024-01-10 RX ADMIN — DEXTROSE, SODIUM CHLORIDE, AND POTASSIUM CHLORIDE 75 ML/HR: 5; .9; .15 INJECTION INTRAVENOUS at 14:56

## 2024-01-10 RX ADMIN — CEFTRIAXONE 1000 MG: 1 INJECTION, SOLUTION INTRAVENOUS at 11:08

## 2024-01-10 RX ADMIN — MAGNESIUM SULFATE HEPTAHYDRATE 2 G: 40 INJECTION, SOLUTION INTRAVENOUS at 09:39

## 2024-01-10 RX ADMIN — FOLIC ACID 1 MG: 1 TABLET ORAL at 08:47

## 2024-01-10 RX ADMIN — POTASSIUM CHLORIDE 40 MEQ: 1500 TABLET, EXTENDED RELEASE ORAL at 08:46

## 2024-01-10 RX ADMIN — CALCIUM CARBONATE 500 MG: 500 TABLET, CHEWABLE ORAL at 08:46

## 2024-01-10 NOTE — ASSESSMENT & PLAN NOTE
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

## 2024-01-10 NOTE — ASSESSMENT & PLAN NOTE
Pt is a 49 yo female with PMH significant for alcohol abuse, hepatic steatosis, and UC who was transferred for [facility] detox unit to [facility] ICU on 1/5 for hematemesis    Pt was evaluated by GI team and initially placed on octreotide infusion  CT scan with evidence of varices and portal HTN  Currently on IV Protonix, and Rocephin.  Octreotide drip discontinued  Pt has associated ABLA and hypotension and was transfused 1u PRBC as well.  Underwent EGD 1/8/2024-normal esophagus, no esophageal or gastric varices, 3 cm hiatal hernia, likely Bill's esophagus, mild portal hypertensive gastropathy and body of stomach, first part of duodenum and second part of duodenum appearing normal.  No old or fresh blood.  Return to floor, resume diet, continue PPI daily
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
## 2024-01-10 NOTE — ASSESSMENT & PLAN NOTE
Patient significantly encephalopathic, lethargic with asterixis on exam  Patient currently under thiamine protocol for concern for Warnicke's  Ammonia level noted to be 178 on admission  Cont lactulose and Xifaxan  Mentation confounded by alcohol withdrawal and serax in ICU  Had confusion, lethargy - appears to be improving:  cont current tx and supportive measures

## 2024-01-10 NOTE — ASSESSMENT & PLAN NOTE
Pt with hypokalemia, hypomagnesemia and hypophosphatemia  On lactulose for hepatic encephalopathy:  contributing to hypokalemia  Cont to replete as needed  Current potassium 2.8 today.  Continue to replete  Continue to replete magnesium and phosphate as well  Also replete hypocalcemia (when adjusted for hypoalbuminemia)

## 2024-01-10 NOTE — ASSESSMENT & PLAN NOTE
Pt has h/o chronic anemia, likely due to anemia of chronic disease with baseline Hb appros 10-11  Pt developed ABLA in the setting of hematemesis and Hb dropped to 7.2  Pt with associated hypotension  Was transfused 1u PRBC on 1/8 with improvement in Hb to 9.2  GI evaluating UGI bleed:  Underwent EGD without evidence of bleeding  Current hgb stable at 8.6

## 2024-01-10 NOTE — ASSESSMENT & PLAN NOTE
Pt presented to [facility] Detox unit with acute alcoholic hepatitis  Initial  and T bili 4.7  Pt was tx with supportive measures:  discriminant function <32 and steroids not indicated  Repeated discriminant function:  5-->steroids again not indicated

## 2024-01-10 NOTE — ASSESSMENT & PLAN NOTE
Bp decreased to 78/53:  hypotension likely due to ABLA  Hb decreased to 7.2 with ABLA from hematemesis  Transfused 1u PRBC  Given ivf and iv albumin  BP 93/68 - may need additional albumin

## 2024-01-10 NOTE — PLAN OF CARE
Problem: Prexisting or High Potential for Compromised Skin Integrity  Goal: Skin integrity is maintained or improved  Description: INTERVENTIONS:  - Identify patients at risk for skin breakdown  - Assess and monitor skin integrity  - Assess and monitor nutrition and hydration status  - Monitor labs   - Assess for incontinence   - Turn and reposition patient  - Assist with mobility/ambulation  - Relieve pressure over bony prominences  - Avoid friction and shearing  - Provide appropriate hygiene as needed including keeping skin clean and dry  - Evaluate need for skin moisturizer/barrier cream  - Collaborate with interdisciplinary team   - Patient/family teaching  - Consider wound care consult   Outcome: Progressing     Problem: PAIN - ADULT  Goal: Verbalizes/displays adequate comfort level or baseline comfort level  Description: Interventions:  - Encourage patient to monitor pain and request assistance  - Assess pain using appropriate pain scale  - Administer analgesics based on type and severity of pain and evaluate response  - Implement non-pharmacological measures as appropriate and evaluate response  - Consider cultural and social influences on pain and pain management  - Notify physician/advanced practitioner if interventions unsuccessful or patient reports new pain  Outcome: Progressing     Problem: INFECTION - ADULT  Goal: Absence or prevention of progression during hospitalization  Description: INTERVENTIONS:  - Assess and monitor for signs and symptoms of infection  - Monitor lab/diagnostic results  - Monitor all insertion sites, i.e. indwelling lines, tubes, and drains  - Monitor endotracheal if appropriate and nasal secretions for changes in amount and color  - [place] appropriate cooling/warming therapies per order  - Administer medications as ordered  - Instruct and encourage patient and family to use good hand hygiene technique  - Identify and instruct in appropriate isolation precautions for  identified infection/condition  Outcome: Progressing  Goal: Absence of fever/infection during neutropenic period  Description: INTERVENTIONS:  - Monitor WBC    Outcome: Progressing     Problem: SAFETY ADULT  Goal: Patient will remain free of falls  Description: INTERVENTIONS:  - Educate patient/family on patient safety including physical limitations  - Instruct patient to call for assistance with activity   - Consult OT/PT to assist with strengthening/mobility   - Keep Call bell within reach  - Keep bed low and locked with side rails adjusted as appropriate  - Keep care items and personal belongings within reach  - Initiate and maintain comfort rounds  - Make Fall Risk Sign visible to staff  - Offer Toileting every 2 Hours, in advance of need  - Initiate/Maintain bed alarm  - Obtain necessary fall risk management equipment: bed alarm   - Apply yellow socks and bracelet for high fall risk patients  - Consider moving patient to room near nurses station  Outcome: Progressing  Goal: Maintain or return to baseline ADL function  Description: INTERVENTIONS:  -  Assess patient's ability to carry out ADLs; assess patient's baseline for ADL function and identify physical deficits which impact ability to perform ADLs (bathing, care of mouth/teeth, toileting, grooming, dressing, etc.)  - Assess/evaluate cause of self-care deficits   - Assess range of motion  - Assess patient's mobility; develop plan if impaired  - Assess patient's need for assistive devices and provide as appropriate  - Encourage maximum independence but intervene and supervise when necessary  - Involve family in performance of ADLs  - Assess for home care needs following discharge   - Consider OT consult to assist with ADL evaluation and planning for discharge  - Provide patient education as appropriate  Outcome: Progressing  Goal: Maintains/Returns to pre admission functional level  Description: INTERVENTIONS:  - Perform AM-PAC 6 Click Basic Mobility/ Daily  Activity assessment daily.  - Set and communicate daily mobility goal to care team and patient/family/caregiver.   - Collaborate with rehabilitation services on mobility goals if consulted  - Perform Range of Motion 4 times a day.  - Reposition patient every 2 hours.  - Dangle patient 4 times a day  - Stand patient 4 times a day  - Ambulate patient 4 times a day  - Out of bed to chair 4 times a day   - Out of bed for meals 4 times a day  - Out of bed for toileting  - Record patient progress and toleration of activity level   Outcome: Progressing     Problem: DISCHARGE PLANNING  Goal: Discharge to home or other facility with appropriate resources  Description: INTERVENTIONS:  - Identify barriers to discharge w/patient and caregiver  - Arrange for needed discharge resources and transportation as appropriate  - Identify discharge learning needs (meds, wound care, etc.)  - Arrange for interpretive services to assist at discharge as needed  - Refer to Case Management Department for coordinating discharge planning if the patient needs post-hospital services based on physician/advanced practitioner order or complex needs related to functional status, cognitive ability, or social support system  Outcome: Progressing     Problem: Knowledge Deficit  Goal: Patient/family/caregiver demonstrates understanding of disease process, treatment plan, medications, and discharge instructions  Description: Complete learning assessment and assess knowledge base.  Interventions:  - Provide teaching at level of understanding  - Provide teaching via preferred learning methods  Outcome: Progressing     Problem: Nutrition/Hydration-ADULT  Goal: Nutrient/Hydration intake appropriate for improving, restoring or maintaining nutritional needs  Description: Monitor and assess patient's nutrition/hydration status for malnutrition. Collaborate with interdisciplinary team and initiate plan and interventions as ordered.  Monitor patient's weight and  dietary intake as ordered or per policy. Utilize nutrition screening tool and intervene as necessary. Determine patient's food preferences and provide high-protein, high-caloric foods as appropriate.     INTERVENTIONS:  - Monitor oral intake, urinary output, labs, and treatment plans  - Assess nutrition and hydration status and recommend course of action  - Evaluate amount of meals eaten  - Assist patient with eating if necessary   - Allow adequate time for meals  - Recommend/ encourage appropriate diets, oral nutritional supplements, and vitamin/mineral supplements  - Order, calculate, and assess calorie counts as needed  - Recommend, monitor, and adjust tube feedings and TPN/PPN based on assessed needs  - Assess need for intravenous fluids  - Provide specific nutrition/hydration education as appropriate  - Include patient/family/caregiver in decisions related to nutrition  Outcome: Progressing     Problem: NEUROSENSORY - ADULT  Goal: Achieves stable or improved neurological status  Description: INTERVENTIONS  - Monitor and report changes in neurological status  - Monitor vital signs such as temperature, blood pressure, glucose, and any other labs ordered   - Initiate measures to prevent increased intracranial pressure  - Monitor for seizure activity and implement precautions if appropriate      Outcome: Progressing  Goal: Achieves maximal functionality and self care  Description: INTERVENTIONS  - Monitor swallowing and airway patency with patient fatigue and changes in neurological status  - Encourage and assist patient to increase activity and self care.   - Encourage visually impaired, hearing impaired and aphasic patients to use assistive/communication devices  Outcome: Progressing     Problem: GASTROINTESTINAL - ADULT  Goal: Minimal or absence of nausea and/or vomiting  Description: INTERVENTIONS:  - Administer IV fluids if ordered to ensure adequate hydration  - Maintain NPO status until nausea and vomiting  are resolved  - Nasogastric tube if ordered  - Administer ordered antiemetic medications as needed  - Provide nonpharmacologic comfort measures as appropriate  - Advance diet as tolerated, if ordered  - Consider nutrition services referral to assist patient with adequate nutrition and appropriate food choices  Outcome: Progressing  Goal: Maintains adequate nutritional intake  Description: INTERVENTIONS:  - Monitor percentage of each meal consumed  - Identify factors contributing to decreased intake, treat as appropriate  - Assist with meals as needed  - Monitor I&O, weight, and lab values if indicated  - Obtain nutrition services referral as needed  Outcome: Progressing  Goal: Oral mucous membranes remain intact  Description: INTERVENTIONS  - Assess oral mucosa and hygiene practices  - Implement preventative oral hygiene regimen  - Implement oral medicated treatments as ordered  - Initiate Nutrition services referral as needed  Outcome: Progressing     Problem: GENITOURINARY - ADULT  Goal: Absence of urinary retention  Description: INTERVENTIONS:  - Assess patient’s ability to void and empty bladder  - Monitor I/O  - Bladder scan as needed  - Discuss with physician/AP medications to alleviate retention as needed  - Discuss catheterization for long term situations as appropriate  Outcome: Progressing     Problem: METABOLIC, FLUID AND ELECTROLYTES - ADULT  Goal: Electrolytes maintained within normal limits  Description: INTERVENTIONS:  - Monitor labs and assess patient for signs and symptoms of electrolyte imbalances  - Administer electrolyte replacement as ordered  - Monitor response to electrolyte replacements, including repeat lab results as appropriate  - Instruct patient on fluid and nutrition as appropriate  Outcome: Progressing  Goal: Fluid balance maintained  Description: INTERVENTIONS:  - Monitor labs   - Monitor I/O and WT  - Instruct patient on fluid and nutrition as appropriate  - Assess for signs &  symptoms of volume excess or deficit  Outcome: Progressing     Problem: SKIN/TISSUE INTEGRITY - ADULT  Goal: Skin Integrity remains intact(Skin Breakdown Prevention)  Description: Assess:  -Perform Leon assessment every   -Clean and moisturize skin every   -Inspect skin when repositioning, toileting, and assisting with ADLS  -Assess under medical devices such as  every   -Assess extremities for adequate circulation and sensation     Bed Management:  -Have minimal linens on bed & keep smooth, unwrinkled  -Change linens as needed when moist or perspiring  -Avoid sitting or lying in one position for more than  hours while in bed  -Keep HOB at degrees     Toileting:  -Offer bedside commode  -Assess for incontinence every   -Use incontinent care products after each incontinent episode such as     Activity:  -Mobilize patient  times a day  -Encourage activity and walks on unit  -Encourage or provide ROM exercises   -Turn and reposition patient every  Hours  -Use appropriate equipment to lift or move patient in bed  -Instruct/ Assist with weight shifting every  when out of bed in chair  -Consider limitation of chair time  hour intervals    Skin Care:  -Avoid use of baby powder, tape, friction and shearing, hot water or constrictive clothing  -Relieve pressure over bony prominences using   -Do not massage red bony areas    Next Steps:  -Teach patient strategies to minimize risks such as    -Consider consults to  interdisciplinary teams such as   Outcome: Progressing     Problem: MUSCULOSKELETAL - ADULT  Goal: Maintain or return mobility to safest level of function  Description: INTERVENTIONS:  - Assess patient's ability to carry out ADLs; assess patient's baseline for ADL function and identify physical deficits which impact ability to perform ADLs (bathing, care of mouth/teeth, toileting, grooming, dressing, etc.)  - Assess/evaluate cause of self-care deficits   - Assess range of motion  - Assess patient's mobility  -  Assess patient's need for assistive devices and provide as appropriate  - Encourage maximum independence but intervene and supervise when necessary  - Involve family in performance of ADLs  - Assess for home care needs following discharge   - Consider OT consult to assist with ADL evaluation and planning for discharge  - Provide patient education as appropriate  Outcome: Progressing

## 2024-01-10 NOTE — PLAN OF CARE
Problem: Prexisting or High Potential for Compromised Skin Integrity  Goal: Skin integrity is maintained or improved  Description: INTERVENTIONS:  - Identify patients at risk for skin breakdown  - Assess and monitor skin integrity  - Assess and monitor nutrition and hydration status  - Monitor labs   - Assess for incontinence   - Turn and reposition patient  - Assist with mobility/ambulation  - Relieve pressure over bony prominences  - Avoid friction and shearing  - Provide appropriate hygiene as needed including keeping skin clean and dry  - Evaluate need for skin moisturizer/barrier cream  - Collaborate with interdisciplinary team   - Patient/family teaching  - Consider wound care consult   Outcome: Progressing     Problem: PAIN - ADULT  Goal: Verbalizes/displays adequate comfort level or baseline comfort level  Description: Interventions:  - Encourage patient to monitor pain and request assistance  - Assess pain using appropriate pain scale  - Administer analgesics based on type and severity of pain and evaluate response  - Implement non-pharmacological measures as appropriate and evaluate response  - Consider cultural and social influences on pain and pain management  - Notify physician/advanced practitioner if interventions unsuccessful or patient reports new pain  Outcome: Progressing     Problem: INFECTION - ADULT  Goal: Absence or prevention of progression during hospitalization  Description: INTERVENTIONS:  - Assess and monitor for signs and symptoms of infection  - Monitor lab/diagnostic results  - Monitor all insertion sites, i.e. indwelling lines, tubes, and drains  - Monitor endotracheal if appropriate and nasal secretions for changes in amount and color  - [place] appropriate cooling/warming therapies per order  - Administer medications as ordered  - Instruct and encourage patient and family to use good hand hygiene technique  - Identify and instruct in appropriate isolation precautions for  identified infection/condition  Outcome: Progressing  Goal: Absence of fever/infection during neutropenic period  Description: INTERVENTIONS:  - Monitor WBC    Outcome: Progressing     Problem: SAFETY ADULT  Goal: Patient will remain free of falls  Description: INTERVENTIONS:  - Educate patient/family on patient safety including physical limitations  - Instruct patient to call for assistance with activity   - Consult OT/PT to assist with strengthening/mobility   - Keep Call bell within reach  - Keep bed low and locked with side rails adjusted as appropriate  - Keep care items and personal belongings within reach  - Initiate and maintain comfort rounds  - Make Fall Risk Sign visible to staff  - Offer Toileting every 2 Hours, in advance of need  - Initiate/Maintain bed alarm  - Obtain necessary fall risk management equipment: alarm  - Apply yellow socks and bracelet for high fall risk patients  - Consider moving patient to room near nurses station  Outcome: Progressing  Goal: Maintain or return to baseline ADL function  Description: INTERVENTIONS:  -  Assess patient's ability to carry out ADLs; assess patient's baseline for ADL function and identify physical deficits which impact ability to perform ADLs (bathing, care of mouth/teeth, toileting, grooming, dressing, etc.)  - Assess/evaluate cause of self-care deficits   - Assess range of motion  - Assess patient's mobility; develop plan if impaired  - Assess patient's need for assistive devices and provide as appropriate  - Encourage maximum independence but intervene and supervise when necessary  - Involve family in performance of ADLs  - Assess for home care needs following discharge   - Consider OT consult to assist with ADL evaluation and planning for discharge  - Provide patient education as appropriate  Outcome: Progressing  Goal: Maintains/Returns to pre admission functional level  Description: INTERVENTIONS:  - Perform AM-PAC 6 Click Basic Mobility/ Daily  Activity assessment daily.  - Set and communicate daily mobility goal to care team and patient/family/caregiver.   - Collaborate with rehabilitation services on mobility goals if consulted  - Perform Range of Motion 2 times a day.  - Reposition patient every 2 hours.  - Dangle patient 2 times a day  - Stand patient 2 times a day  - Ambulate patient 2 times a day  - Out of bed to chair 2 times a day   - Out of bed for meals 2 times a day  - Out of bed for toileting  - Record patient progress and toleration of activity level   Outcome: Progressing     Problem: DISCHARGE PLANNING  Goal: Discharge to home or other facility with appropriate resources  Description: INTERVENTIONS:  - Identify barriers to discharge w/patient and caregiver  - Arrange for needed discharge resources and transportation as appropriate  - Identify discharge learning needs (meds, wound care, etc.)  - Arrange for interpretive services to assist at discharge as needed  - Refer to Case Management Department for coordinating discharge planning if the patient needs post-hospital services based on physician/advanced practitioner order or complex needs related to functional status, cognitive ability, or social support system  Outcome: Progressing     Problem: Knowledge Deficit  Goal: Patient/family/caregiver demonstrates understanding of disease process, treatment plan, medications, and discharge instructions  Description: Complete learning assessment and assess knowledge base.  Interventions:  - Provide teaching at level of understanding  - Provide teaching via preferred learning methods  Outcome: Progressing     Problem: Nutrition/Hydration-ADULT  Goal: Nutrient/Hydration intake appropriate for improving, restoring or maintaining nutritional needs  Description: Monitor and assess patient's nutrition/hydration status for malnutrition. Collaborate with interdisciplinary team and initiate plan and interventions as ordered.  Monitor patient's weight and  dietary intake as ordered or per policy. Utilize nutrition screening tool and intervene as necessary. Determine patient's food preferences and provide high-protein, high-caloric foods as appropriate.     INTERVENTIONS:  - Monitor oral intake, urinary output, labs, and treatment plans  - Assess nutrition and hydration status and recommend course of action  - Evaluate amount of meals eaten  - Assist patient with eating if necessary   - Allow adequate time for meals  - Recommend/ encourage appropriate diets, oral nutritional supplements, and vitamin/mineral supplements  - Order, calculate, and assess calorie counts as needed  - Recommend, monitor, and adjust tube feedings and TPN/PPN based on assessed needs  - Assess need for intravenous fluids  - Provide specific nutrition/hydration education as appropriate  - Include patient/family/caregiver in decisions related to nutrition  Outcome: Progressing     Problem: NEUROSENSORY - ADULT  Goal: Achieves stable or improved neurological status  Description: INTERVENTIONS  - Monitor and report changes in neurological status  - Monitor vital signs such as temperature, blood pressure, glucose, and any other labs ordered   - Initiate measures to prevent increased intracranial pressure  - Monitor for seizure activity and implement precautions if appropriate      Outcome: Progressing  Goal: Achieves maximal functionality and self care  Description: INTERVENTIONS  - Monitor swallowing and airway patency with patient fatigue and changes in neurological status  - Encourage and assist patient to increase activity and self care.   - Encourage visually impaired, hearing impaired and aphasic patients to use assistive/communication devices  Outcome: Progressing     Problem: GASTROINTESTINAL - ADULT  Goal: Minimal or absence of nausea and/or vomiting  Description: INTERVENTIONS:  - Administer IV fluids if ordered to ensure adequate hydration  - Maintain NPO status until nausea and vomiting  are resolved  - Nasogastric tube if ordered  - Administer ordered antiemetic medications as needed  - Provide nonpharmacologic comfort measures as appropriate  - Advance diet as tolerated, if ordered  - Consider nutrition services referral to assist patient with adequate nutrition and appropriate food choices  Outcome: Progressing  Goal: Maintains adequate nutritional intake  Description: INTERVENTIONS:  - Monitor percentage of each meal consumed  - Identify factors contributing to decreased intake, treat as appropriate  - Assist with meals as needed  - Monitor I&O, weight, and lab values if indicated  - Obtain nutrition services referral as needed  Outcome: Progressing  Goal: Oral mucous membranes remain intact  Description: INTERVENTIONS  - Assess oral mucosa and hygiene practices  - Implement preventative oral hygiene regimen  - Implement oral medicated treatments as ordered  - Initiate Nutrition services referral as needed  Outcome: Progressing     Problem: GENITOURINARY - ADULT  Goal: Absence of urinary retention  Description: INTERVENTIONS:  - Assess patient’s ability to void and empty bladder  - Monitor I/O  - Bladder scan as needed  - Discuss with physician/AP medications to alleviate retention as needed  - Discuss catheterization for long term situations as appropriate  Outcome: Progressing     Problem: METABOLIC, FLUID AND ELECTROLYTES - ADULT  Goal: Electrolytes maintained within normal limits  Description: INTERVENTIONS:  - Monitor labs and assess patient for signs and symptoms of electrolyte imbalances  - Administer electrolyte replacement as ordered  - Monitor response to electrolyte replacements, including repeat lab results as appropriate  - Instruct patient on fluid and nutrition as appropriate  Outcome: Progressing  Goal: Fluid balance maintained  Description: INTERVENTIONS:  - Monitor labs   - Monitor I/O and WT  - Instruct patient on fluid and nutrition as appropriate  - Assess for signs &  symptoms of volume excess or deficit  Outcome: Progressing     Problem: SKIN/TISSUE INTEGRITY - ADULT  Goal: Skin Integrity remains intact(Skin Breakdown Prevention)  Description: Assess:  -Perform Leon assessment every shift  -Clean and moisturize skin every shift  -Inspect skin when repositioning, toileting, and assisting with ADLS  -Assess extremities for adequate circulation and sensation     Bed Management:  -Have minimal linens on bed & keep smooth, unwrinkled  -Change linens as needed when moist or perspiring  -Avoid sitting or lying in one position for more than 2 hours while in bed    Toileting:  -Offer bedside commode  -Assess for incontinence every shift  -Encourage activity and walks on unit  -Encourage or provide ROM exercises      Problem: MUSCULOSKELETAL - ADULT  Goal: Maintain or return mobility to safest level of function  Description: INTERVENTIONS:  - Assess patient's ability to carry out ADLs; assess patient's baseline for ADL function and identify physical deficits which impact ability to perform ADLs (bathing, care of mouth/teeth, toileting, grooming, dressing, etc.)  - Assess/evaluate cause of self-care deficits   - Assess range of motion  - Assess patient's mobility  - Assess patient's need for assistive devices and provide as appropriate  - Encourage maximum independence but intervene and supervise when necessary  - Involve family in performance of ADLs  - Assess for home care needs following discharge   - Consider OT consult to assist with ADL evaluation and planning for discharge  - Provide patient education as appropriate  Outcome: Progressing
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
## 2024-01-11 PROBLEM — F41.9 ANXIETY: Status: ACTIVE | Noted: 2024-01-11

## 2024-01-11 LAB
ALBUMIN SERPL BCP-MCNC: 2.8 G/DL (ref 3.5–5)
ALP SERPL-CCNC: 135 U/L (ref 34–104)
ALT SERPL W P-5'-P-CCNC: 17 U/L (ref 7–52)
ANION GAP SERPL CALCULATED.3IONS-SCNC: 6 MMOL/L
ANION GAP SERPL CALCULATED.3IONS-SCNC: 7 MMOL/L
AST SERPL W P-5'-P-CCNC: 42 U/L (ref 13–39)
BILIRUB SERPL-MCNC: 2.91 MG/DL (ref 0.2–1)
BUN SERPL-MCNC: 2 MG/DL (ref 5–25)
BUN SERPL-MCNC: 2 MG/DL (ref 5–25)
CALCIUM ALBUM COR SERPL-MCNC: 8.5 MG/DL (ref 8.3–10.1)
CALCIUM SERPL-MCNC: 7.5 MG/DL (ref 8.4–10.2)
CALCIUM SERPL-MCNC: 7.8 MG/DL (ref 8.4–10.2)
CHLORIDE SERPL-SCNC: 106 MMOL/L (ref 96–108)
CHLORIDE SERPL-SCNC: 106 MMOL/L (ref 96–108)
CO2 SERPL-SCNC: 16 MMOL/L (ref 21–32)
CO2 SERPL-SCNC: 17 MMOL/L (ref 21–32)
CREAT SERPL-MCNC: 0.31 MG/DL (ref 0.6–1.3)
CREAT SERPL-MCNC: 0.36 MG/DL (ref 0.6–1.3)
ERYTHROCYTE [DISTWIDTH] IN BLOOD BY AUTOMATED COUNT: 16 % (ref 11.6–15.1)
GFR SERPL CREATININE-BSD FRML MDRD: 128 ML/MIN/1.73SQ M
GFR SERPL CREATININE-BSD FRML MDRD: 135 ML/MIN/1.73SQ M
GLUCOSE SERPL-MCNC: 116 MG/DL (ref 65–140)
GLUCOSE SERPL-MCNC: 123 MG/DL (ref 65–140)
GLUCOSE SERPL-MCNC: 125 MG/DL (ref 65–140)
GLUCOSE SERPL-MCNC: 90 MG/DL (ref 65–140)
GLUCOSE SERPL-MCNC: 99 MG/DL (ref 65–140)
HCT VFR BLD AUTO: 26.9 % (ref 34.8–46.1)
HGB BLD-MCNC: 9.1 G/DL (ref 11.5–15.4)
MAGNESIUM SERPL-MCNC: 1.5 MG/DL (ref 1.9–2.7)
MAGNESIUM SERPL-MCNC: 6.6 MG/DL (ref 1.9–2.7)
MCH RBC QN AUTO: 32 PG (ref 26.8–34.3)
MCHC RBC AUTO-ENTMCNC: 33.8 G/DL (ref 31.4–37.4)
MCV RBC AUTO: 95 FL (ref 82–98)
PLATELET # BLD AUTO: 155 THOUSANDS/UL (ref 149–390)
PMV BLD AUTO: 10.8 FL (ref 8.9–12.7)
POTASSIUM SERPL-SCNC: 3.1 MMOL/L (ref 3.5–5.3)
POTASSIUM SERPL-SCNC: 3.4 MMOL/L (ref 3.5–5.3)
PROT SERPL-MCNC: 5.3 G/DL (ref 6.4–8.4)
RBC # BLD AUTO: 2.84 MILLION/UL (ref 3.81–5.12)
SODIUM SERPL-SCNC: 129 MMOL/L (ref 135–147)
SODIUM SERPL-SCNC: 129 MMOL/L (ref 135–147)
WBC # BLD AUTO: 9.47 THOUSAND/UL (ref 4.31–10.16)

## 2024-01-11 PROCEDURE — 80053 COMPREHEN METABOLIC PANEL: CPT | Performed by: PHYSICIAN ASSISTANT

## 2024-01-11 PROCEDURE — 97530 THERAPEUTIC ACTIVITIES: CPT

## 2024-01-11 PROCEDURE — 85027 COMPLETE CBC AUTOMATED: CPT | Performed by: PHYSICIAN ASSISTANT

## 2024-01-11 PROCEDURE — 80048 BASIC METABOLIC PNL TOTAL CA: CPT | Performed by: PHYSICIAN ASSISTANT

## 2024-01-11 PROCEDURE — 83735 ASSAY OF MAGNESIUM: CPT | Performed by: PHYSICIAN ASSISTANT

## 2024-01-11 PROCEDURE — 97116 GAIT TRAINING THERAPY: CPT

## 2024-01-11 PROCEDURE — 82948 REAGENT STRIP/BLOOD GLUCOSE: CPT

## 2024-01-11 PROCEDURE — 99232 SBSQ HOSP IP/OBS MODERATE 35: CPT | Performed by: PHYSICIAN ASSISTANT

## 2024-01-11 RX ORDER — PANTOPRAZOLE SODIUM 40 MG/1
40 TABLET, DELAYED RELEASE ORAL
Status: DISCONTINUED | OUTPATIENT
Start: 2024-01-12 | End: 2024-01-20 | Stop reason: HOSPADM

## 2024-01-11 RX ORDER — ALBUMIN (HUMAN) 12.5 G/50ML
25 SOLUTION INTRAVENOUS 2 TIMES DAILY
Status: COMPLETED | OUTPATIENT
Start: 2024-01-11 | End: 2024-01-12

## 2024-01-11 RX ORDER — LORAZEPAM 2 MG/ML
0.5 INJECTION INTRAMUSCULAR ONCE AS NEEDED
Status: COMPLETED | OUTPATIENT
Start: 2024-01-11 | End: 2024-01-11

## 2024-01-11 RX ORDER — LACTULOSE 10 G/15ML
20 SOLUTION ORAL DAILY
Status: DISCONTINUED | OUTPATIENT
Start: 2024-01-12 | End: 2024-01-12

## 2024-01-11 RX ORDER — ESCITALOPRAM OXALATE 10 MG/1
10 TABLET ORAL DAILY
Status: DISCONTINUED | OUTPATIENT
Start: 2024-01-11 | End: 2024-01-20 | Stop reason: HOSPADM

## 2024-01-11 RX ORDER — PANTOPRAZOLE SODIUM 40 MG/1
40 TABLET, DELAYED RELEASE ORAL
Status: DISCONTINUED | OUTPATIENT
Start: 2024-01-11 | End: 2024-01-11

## 2024-01-11 RX ORDER — LANOLIN ALCOHOL/MO/W.PET/CERES
100 CREAM (GRAM) TOPICAL DAILY
Status: DISCONTINUED | OUTPATIENT
Start: 2024-01-12 | End: 2024-01-20 | Stop reason: HOSPADM

## 2024-01-11 RX ORDER — POTASSIUM CHLORIDE 20 MEQ/1
40 TABLET, EXTENDED RELEASE ORAL ONCE
Qty: 2 TABLET | Refills: 0 | Status: COMPLETED | OUTPATIENT
Start: 2024-01-11 | End: 2024-01-11

## 2024-01-11 RX ADMIN — DEXTROSE, SODIUM CHLORIDE, AND POTASSIUM CHLORIDE 75 ML/HR: 5; .9; .15 INJECTION INTRAVENOUS at 04:59

## 2024-01-11 RX ADMIN — ALBUMIN (HUMAN) 25 G: 0.25 INJECTION, SOLUTION INTRAVENOUS at 12:57

## 2024-01-11 RX ADMIN — Medication 1 TABLET: at 08:37

## 2024-01-11 RX ADMIN — POTASSIUM CHLORIDE 40 MEQ: 1500 TABLET, EXTENDED RELEASE ORAL at 10:10

## 2024-01-11 RX ADMIN — LORAZEPAM 0.5 MG: 2 INJECTION INTRAMUSCULAR; INTRAVENOUS at 11:59

## 2024-01-11 RX ADMIN — RIFAXIMIN 550 MG: 550 TABLET ORAL at 08:42

## 2024-01-11 RX ADMIN — FOLIC ACID 1 MG: 1 TABLET ORAL at 08:37

## 2024-01-11 RX ADMIN — ALBUMIN (HUMAN) 25 G: 0.25 INJECTION, SOLUTION INTRAVENOUS at 22:07

## 2024-01-11 RX ADMIN — THIAMINE HYDROCHLORIDE 250 MG: 100 INJECTION, SOLUTION INTRAMUSCULAR; INTRAVENOUS at 08:42

## 2024-01-11 RX ADMIN — PANTOPRAZOLE SODIUM 40 MG: 40 TABLET, DELAYED RELEASE ORAL at 08:42

## 2024-01-11 RX ADMIN — RIFAXIMIN 550 MG: 550 TABLET ORAL at 22:07

## 2024-01-11 RX ADMIN — ESCITALOPRAM OXALATE 10 MG: 10 TABLET ORAL at 11:59

## 2024-01-11 RX ADMIN — CEFTRIAXONE 1000 MG: 1 INJECTION, SOLUTION INTRAVENOUS at 10:26

## 2024-01-11 NOTE — ASSESSMENT & PLAN NOTE
Pt has h/o chronic anemia, likely due to anemia of chronic disease with baseline Hb appros 10-11  Pt developed ABLA in the setting of hematemesis and Hb dropped to 7.2  Pt with associated hypotension  Was transfused 1u PRBC on 1/8 with improvement in Hgb  GI evaluating UGI bleed:  Underwent EGD without evidence of bleeding  Current hgb stable at 8.6 -->9.1

## 2024-01-11 NOTE — PLAN OF CARE
Problem: Prexisting or High Potential for Compromised Skin Integrity  Goal: Skin integrity is maintained or improved  Description: INTERVENTIONS:  - Identify patients at risk for skin breakdown  - Assess and monitor skin integrity  - Assess and monitor nutrition and hydration status  - Monitor labs   - Assess for incontinence   - Turn and reposition patient  - Assist with mobility/ambulation  - Relieve pressure over bony prominences  - Avoid friction and shearing  - Provide appropriate hygiene as needed including keeping skin clean and dry  - Evaluate need for skin moisturizer/barrier cream  - Collaborate with interdisciplinary team   - Patient/family teaching  - Consider wound care consult   Outcome: Progressing     Problem: PAIN - ADULT  Goal: Verbalizes/displays adequate comfort level or baseline comfort level  Description: Interventions:  - Encourage patient to monitor pain and request assistance  - Assess pain using appropriate pain scale  - Administer analgesics based on type and severity of pain and evaluate response  - Implement non-pharmacological measures as appropriate and evaluate response  - Consider cultural and social influences on pain and pain management  - Notify physician/advanced practitioner if interventions unsuccessful or patient reports new pain  Outcome: Progressing     Problem: INFECTION - ADULT  Goal: Absence or prevention of progression during hospitalization  Description: INTERVENTIONS:  - Assess and monitor for signs and symptoms of infection  - Monitor lab/diagnostic results  - Monitor all insertion sites, i.e. indwelling lines, tubes, and drains  - Monitor endotracheal if appropriate and nasal secretions for changes in amount and color  - [place] appropriate cooling/warming therapies per order  - Administer medications as ordered  - Instruct and encourage patient and family to use good hand hygiene technique  - Identify and instruct in appropriate isolation precautions for  identified infection/condition  Outcome: Progressing  Goal: Absence of fever/infection during neutropenic period  Description: INTERVENTIONS:  - Monitor WBC    Outcome: Progressing     Problem: SAFETY ADULT  Goal: Patient will remain free of falls  Description: INTERVENTIONS:  - Educate patient/family on patient safety including physical limitations  - Instruct patient to call for assistance with activity   - Consult OT/PT to assist with strengthening/mobility   - Keep Call bell within reach  - Keep bed low and locked with side rails adjusted as appropriate  - Keep care items and personal belongings within reach  - Initiate and maintain comfort rounds  - Make Fall Risk Sign visible to staff  - Offer Toileting every 2 Hours, in advance of need  - Initiate/Maintain bed alarm  - Obtain necessary fall risk management equipment: bed alarm   - Apply yellow socks and bracelet for high fall risk patients  - Consider moving patient to room near nurses station  Outcome: Progressing  Goal: Maintain or return to baseline ADL function  Description: INTERVENTIONS:  -  Assess patient's ability to carry out ADLs; assess patient's baseline for ADL function and identify physical deficits which impact ability to perform ADLs (bathing, care of mouth/teeth, toileting, grooming, dressing, etc.)  - Assess/evaluate cause of self-care deficits   - Assess range of motion  - Assess patient's mobility; develop plan if impaired  - Assess patient's need for assistive devices and provide as appropriate  - Encourage maximum independence but intervene and supervise when necessary  - Involve family in performance of ADLs  - Assess for home care needs following discharge   - Consider OT consult to assist with ADL evaluation and planning for discharge  - Provide patient education as appropriate  Outcome: Progressing  Goal: Maintains/Returns to pre admission functional level  Description: INTERVENTIONS:  - Perform AM-PAC 6 Click Basic Mobility/ Daily  Activity assessment daily.  - Set and communicate daily mobility goal to care team and patient/family/caregiver.   - Collaborate with rehabilitation services on mobility goals if consulted  - Perform Range of Motion 4 times a day.  - Reposition patient every 2 hours.  - Dangle patient 4 times a day  - Stand patient 4 times a day  - Ambulate patient 4 times a day  - Out of bed to chair 4 times a day   - Out of bed for meals 4 times a day  - Out of bed for toileting  - Record patient progress and toleration of activity level   Outcome: Progressing     Problem: DISCHARGE PLANNING  Goal: Discharge to home or other facility with appropriate resources  Description: INTERVENTIONS:  - Identify barriers to discharge w/patient and caregiver  - Arrange for needed discharge resources and transportation as appropriate  - Identify discharge learning needs (meds, wound care, etc.)  - Arrange for interpretive services to assist at discharge as needed  - Refer to Case Management Department for coordinating discharge planning if the patient needs post-hospital services based on physician/advanced practitioner order or complex needs related to functional status, cognitive ability, or social support system  Outcome: Progressing     Problem: Knowledge Deficit  Goal: Patient/family/caregiver demonstrates understanding of disease process, treatment plan, medications, and discharge instructions  Description: Complete learning assessment and assess knowledge base.  Interventions:  - Provide teaching at level of understanding  - Provide teaching via preferred learning methods  Outcome: Progressing     Problem: Nutrition/Hydration-ADULT  Goal: Nutrient/Hydration intake appropriate for improving, restoring or maintaining nutritional needs  Description: Monitor and assess patient's nutrition/hydration status for malnutrition. Collaborate with interdisciplinary team and initiate plan and interventions as ordered.  Monitor patient's weight and  dietary intake as ordered or per policy. Utilize nutrition screening tool and intervene as necessary. Determine patient's food preferences and provide high-protein, high-caloric foods as appropriate.     INTERVENTIONS:  - Monitor oral intake, urinary output, labs, and treatment plans  - Assess nutrition and hydration status and recommend course of action  - Evaluate amount of meals eaten  - Assist patient with eating if necessary   - Allow adequate time for meals  - Recommend/ encourage appropriate diets, oral nutritional supplements, and vitamin/mineral supplements  - Order, calculate, and assess calorie counts as needed  - Recommend, monitor, and adjust tube feedings and TPN/PPN based on assessed needs  - Assess need for intravenous fluids  - Provide specific nutrition/hydration education as appropriate  - Include patient/family/caregiver in decisions related to nutrition  Outcome: Progressing     Problem: NEUROSENSORY - ADULT  Goal: Achieves stable or improved neurological status  Description: INTERVENTIONS  - Monitor and report changes in neurological status  - Monitor vital signs such as temperature, blood pressure, glucose, and any other labs ordered   - Initiate measures to prevent increased intracranial pressure  - Monitor for seizure activity and implement precautions if appropriate      Outcome: Progressing  Goal: Achieves maximal functionality and self care  Description: INTERVENTIONS  - Monitor swallowing and airway patency with patient fatigue and changes in neurological status  - Encourage and assist patient to increase activity and self care.   - Encourage visually impaired, hearing impaired and aphasic patients to use assistive/communication devices  Outcome: Progressing     Problem: GASTROINTESTINAL - ADULT  Goal: Minimal or absence of nausea and/or vomiting  Description: INTERVENTIONS:  - Administer IV fluids if ordered to ensure adequate hydration  - Maintain NPO status until nausea and vomiting  are resolved  - Nasogastric tube if ordered  - Administer ordered antiemetic medications as needed  - Provide nonpharmacologic comfort measures as appropriate  - Advance diet as tolerated, if ordered  - Consider nutrition services referral to assist patient with adequate nutrition and appropriate food choices  Outcome: Progressing  Goal: Maintains adequate nutritional intake  Description: INTERVENTIONS:  - Monitor percentage of each meal consumed  - Identify factors contributing to decreased intake, treat as appropriate  - Assist with meals as needed  - Monitor I&O, weight, and lab values if indicated  - Obtain nutrition services referral as needed  Outcome: Progressing  Goal: Oral mucous membranes remain intact  Description: INTERVENTIONS  - Assess oral mucosa and hygiene practices  - Implement preventative oral hygiene regimen  - Implement oral medicated treatments as ordered  - Initiate Nutrition services referral as needed  Outcome: Progressing     Problem: GENITOURINARY - ADULT  Goal: Absence of urinary retention  Description: INTERVENTIONS:  - Assess patient’s ability to void and empty bladder  - Monitor I/O  - Bladder scan as needed  - Discuss with physician/AP medications to alleviate retention as needed  - Discuss catheterization for long term situations as appropriate  Outcome: Progressing     Problem: METABOLIC, FLUID AND ELECTROLYTES - ADULT  Goal: Electrolytes maintained within normal limits  Description: INTERVENTIONS:  - Monitor labs and assess patient for signs and symptoms of electrolyte imbalances  - Administer electrolyte replacement as ordered  - Monitor response to electrolyte replacements, including repeat lab results as appropriate  - Instruct patient on fluid and nutrition as appropriate  Outcome: Progressing  Goal: Fluid balance maintained  Description: INTERVENTIONS:  - Monitor labs   - Monitor I/O and WT  - Instruct patient on fluid and nutrition as appropriate  - Assess for signs &  symptoms of volume excess or deficit  Outcome: Progressing     Problem: SKIN/TISSUE INTEGRITY - ADULT  Goal: Skin Integrity remains intact(Skin Breakdown Prevention)  Description: Assess:  -Perform Leon assessment every   -Clean and moisturize skin every   -Inspect skin when repositioning, toileting, and assisting with ADLS  -Assess under medical devices such as  every   -Assess extremities for adequate circulation and sensation     Bed Management:  -Have minimal linens on bed & keep smooth, unwrinkled  -Change linens as needed when moist or perspiring  -Avoid sitting or lying in one position for more than  hours while in bed  -Keep HOB at degrees     Toileting:  -Offer bedside commode  -Assess for incontinence every   -Use incontinent care products after each incontinent episode such as     Activity:  -Mobilize patient times a day  -Encourage activity and walks on unit  -Encourage or provide ROM exercises   -Turn and reposition patient every  Hours  -Use appropriate equipment to lift or move patient in bed  -Instruct/ Assist with weight shifting every  when out of bed in chair  -Consider limitation of chair time  hour intervals    Skin Care:  -Avoid use of baby powder, tape, friction and shearing, hot water or constrictive clothing  -Relieve pressure over bony prominences using   -Do not massage red bony areas    Next Steps:  -Teach patient strategies to minimize risks such as    -Consider consults to  interdisciplinary teams such as   Outcome: Progressing     Problem: MUSCULOSKELETAL - ADULT  Goal: Maintain or return mobility to safest level of function  Description: INTERVENTIONS:  - Assess patient's ability to carry out ADLs; assess patient's baseline for ADL function and identify physical deficits which impact ability to perform ADLs (bathing, care of mouth/teeth, toileting, grooming, dressing, etc.)  - Assess/evaluate cause of self-care deficits   - Assess range of motion  - Assess patient's mobility  -  Assess patient's need for assistive devices and provide as appropriate  - Encourage maximum independence but intervene and supervise when necessary  - Involve family in performance of ADLs  - Assess for home care needs following discharge   - Consider OT consult to assist with ADL evaluation and planning for discharge  - Provide patient education as appropriate  Outcome: Progressing

## 2024-01-11 NOTE — ASSESSMENT & PLAN NOTE
Patient was significantly encephalopathic, lethargic with asterixis on exam  Received thiamine protocol for concern for Warnicke's  Ammonia level noted to be 178 on admission  Was started on lactulose and Xifaxan  Had confusion, lethargy - appears to continue to improve  Will lower lactulose dosage  Check a.m. ammonia

## 2024-01-11 NOTE — PHYSICAL THERAPY NOTE
Physical Therapy Treatment Note     01/11/24 1027   PT Last Visit   PT Visit Date 01/11/24   Note Type   Note Type Treatment   Pain Assessment   Pain Assessment Tool 0-10   Pain Score No Pain   Restrictions/Precautions   Weight Bearing Precautions Per Order No   Other Precautions Multiple lines;Fall Risk;Bed Alarm   General   Chart Reviewed Yes   Family/Caregiver Present No   Subjective   Subjective Pt. agreeable to PT   Bed Mobility   Supine to Sit 7  Independent   Sit to Supine 7  Independent   Transfers   Sit to Stand 5  Supervision   Additional items Assist x 1   Stand to Sit 5  Supervision   Additional items Assist x 1   Stand pivot 5  Supervision   Additional items Assist x 1   Toilet transfer 5  Supervision   Additional items Increased time required;Standard toilet;Assist x 1  (grab bar)   Ambulation/Elevation   Gait pattern Improper Weight shift;Decreased foot clearance;Decreased heel strike;Decreased toe off  (lateral sways)   Gait Assistance 5  Supervision   Additional items Assist x 1   Assistive Device None   Distance 250ft, 320ft   Stair Management Assistance 5  Supervision   Additional items Assist x 1;Increased time required   Stair Management Technique Two rails;Alternating pattern;Foreward;Reciprocal   Number of Stairs 16   Balance   Static Sitting Good   Dynamic Sitting Fair +   Static Standing Fair +   Dynamic Standing Fair   Ambulatory Fair   Activity Tolerance   Activity Tolerance Patient tolerated treatment well   Nurse Made Aware yes   Assessment   Prognosis Good   Problem List Decreased mobility;Impaired balance;Decreased endurance   Assessment Pt. progressing well with overall mobility and needed no physical assistance for the same. Pt. reported no increased discomfort post session. Pt. noted no LOB t/o session however lateral sways noted during ambulation. Pt. performed toileting and hygeine independently. pt. had seated rest after ambulation 250ft and negotiating 16 stairs. Talked to Maykel  PT regarding patient's mobility progress and was agreeable to change in DCP to level 3 and CM and RN was also informed of the same. Pt. was back in bed with all needs within reach and bed alarm engaged. Will continue to follow per PT POC. Encouraged pt. to ambulate with staff on the unit.   Goals   Patient Goals None reported   STG Expiration Date 01/22/24   PT Treatment Day 1   Plan   Treatment/Interventions Functional transfer training;Elevations;Patient/family training;Equipment eval/education;Bed mobility;Gait training;Spoke to nursing;Spoke to case management  (PT DR)   Progress Progressing toward goals   PT Frequency 3-5x/wk   Discharge Recommendation   Rehab Resource Intensity Level, PT III (Minimum Resource Intensity)   AM-PAC Basic Mobility Inpatient   Turning in Flat Bed Without Bedrails 4   Lying on Back to Sitting on Edge of Flat Bed Without Bedrails 4   Moving Bed to Chair 4   Standing Up From Chair Using Arms 4   Walk in Room 4   Climb 3-5 Stairs With Railing 4   Basic Mobility Inpatient Raw Score 24   Basic Mobility Standardized Score 57.68   Highest Level Of Mobility   JH-HLM Goal 8: Walk 250 feet or more   JH-HLM Achieved 8: Walk 250 feet ot more   End of Consult   Patient Position at End of Consult Bed/Chair alarm activated;All needs within reach;Supine           Harman Gaviria PTA    An AM-PAC basic mobility standardized score less than 42.9 suggest the patient may benefit from discharge to post-acute rehab services.

## 2024-01-11 NOTE — ASSESSMENT & PLAN NOTE
Pt presented to [facility] Detox unit with acute alcoholic hepatitis  Initial  and T bili 4.7  Pt was tx with supportive measures:  discriminant function <32 and steroids not indicated  Repeated discriminant function:  5-->steroids again not indicated  AST improved to 42 and tbili now 2.91
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
## 2024-01-11 NOTE — ASSESSMENT & PLAN NOTE
Feeling very anxious today.  Will give dose of IV Ativan now  Discussed starting low dose Lexapro  Patient agreeable

## 2024-01-11 NOTE — ASSESSMENT & PLAN NOTE
Bp decreased to 78/53:  hypotension likely due to ABLA  Transfused 1u PRBC  Given ivf and iv albumin  BP remains systolically in the 90s  Will repeat dose of albumin

## 2024-01-11 NOTE — MALNUTRITION/BMI
This medical record reflects one or more clinical indicators suggestive of malnutrition .    Malnutrition Findings:   Adult Malnutrition type: Chronic illness  Adult Degree of Malnutrition: Other severe protein calorie malnutrition  Malnutrition Characteristics: Muscle loss, Fat loss                  360 Statement: severe protein calorie malnutrition related to inadequate protein intake and lack of nutrient-dense foods with excessive ETOH intake as evidenced by  consumption of 8-10 beers daily and hard liquor although she can not quantify how much liquor; severe muscle wasting(temporalis, pectoralis, fat loss (orbital fat pads, triceps), treated with TBD    BMI Findings:  Adult BMI Classifications: Underweight < 18.5        Body mass index is 18.43 kg/m².     See Nutrition note dated 1/8/24 for additional details.  Completed nutrition assessment is viewable in the nutrition documentation.

## 2024-01-11 NOTE — ASSESSMENT & PLAN NOTE
Likely combination of poor oral intake, low solute intake, excessive free water due to beer potomania  Most recent is 129 - after IV fluids - stop at this time   Recheck later today

## 2024-01-11 NOTE — RESTORATIVE TECHNICIAN NOTE
Restorative Technician Note      Patient Name: Leslye Holland     Restorative Tech Visit Date: 01/11/24  Note Type: Mobility  Patient Position Upon Consult: Seated edge of bed  Mobility / Activity Provided: pt refused ambulation and getting into a BSC; pt stated after she eats she would like to go back to bed because she was up all night and got no sleep; will attempt to see pt later on  Patient Position at End of Consult: Seated edge of bed; All needs within reach

## 2024-01-11 NOTE — PLAN OF CARE
Problem: PHYSICAL THERAPY ADULT  Goal: Performs mobility at highest level of function for planned discharge setting.  See evaluation for individualized goals.  Description: Treatment/Interventions: LE strengthening/ROM, Elevations, Therapeutic exercise, Endurance training, Bed mobility, Gait training, Spoke to nursing, OT  Equipment Recommended: Walker (pt does not own)       See flowsheet documentation for full assessment, interventions and recommendations.  Outcome: Progressing  Note: Prognosis: Good  Problem List: Decreased mobility, Impaired balance, Decreased endurance  Assessment: Pt. progressing well with overall mobility and needed no physical assistance for the same. Pt. reported no increased discomfort post session. Pt. noted no LOB t/o session however lateral sways noted during ambulation. Pt. performed toileting and hygeine independently. pt. had seated rest after ambulation 250ft and negotiating 16 stairs. Talked to Maykel PT regarding patient's mobility progress and was agreeable to change in DCP to level 3 and CM and RN was also informed of the same. Pt. was back in bed with all needs within reach and bed alarm engaged. Will continue to follow per PT POC. Encouraged pt. to ambulate with staff on the unit.        Rehab Resource Intensity Level, PT: III (Minimum Resource Intensity)    See flowsheet documentation for full assessment.

## 2024-01-11 NOTE — ASSESSMENT & PLAN NOTE
Malnutrition Findings:   Adult Malnutrition type: Chronic illness  Adult Degree of Malnutrition: Other severe protein calorie malnutrition  Malnutrition Characteristics: Muscle loss, Fat loss  360 Statement: severe protein calorie malnutrition related to inadequate protein intake and lack of nutrient-dense foods with excessive ETOH intake as evidenced by  consumption of 8-10 beers daily and hard liquor although she can not quantify how much liquor; severe muscle wasting(temporalis, pectoralis, fat loss (orbital fat pads, triceps), treated with TBD  BMI Findings:  Adult BMI Classifications: Underweight < 18.5  Body mass index is 18.75 kg/m².

## 2024-01-11 NOTE — ASSESSMENT & PLAN NOTE
Patient was initially admitted to [facility] on 1/4/24 and was treated with SEWS protocol with phenobarbital  She was transferred to [facility] ICU 1/6/24 for evaluation of GI bleed   She was started on serax but it was further discontinued 1/7 due to somnolence  S/p CIWA protocol  Pt received iv thiamine 500mg IV q8h x 2 days, then 250mg IV daily x 5 days  Now on oral thiamine and folic acid  Feeling anxious again today-given extra dose of IV Ativan

## 2024-01-11 NOTE — ASSESSMENT & PLAN NOTE
For a pt is a 47 yo female with PMH significant for alcohol abuse, hepatic steatosis, and UC who was transferred for [facility] detox unit to [facility] ICU on 1/5 for hematemesis    Pt was evaluated by GI team and initially placed on octreotide infusion  CT scan with evidence of varices and portal HTN  Received IV Protonix, and Rocephin for SBP prophylaxis  Will have completed 7 days IV ceftriaxone tomorrow  Octreotide drip discontinued  Had associated ABLA and hypotension and was transfused 1u PRBC as well.  Underwent EGD 1/8/2024-normal esophagus, no esophageal or gastric varices, 3 cm hiatal hernia, likely Bill's esophagus, mild portal hypertensive gastropathy and body of stomach, first part of duodenum and second part of duodenum appearing normal.  No old or fresh blood.  Return to floor, resume diet, continue oral PPI daily
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
## 2024-01-11 NOTE — PROGRESS NOTES
The pantoprazole has been converted to Oral per [facility] IV-to-PO Auto-Conversion Protocol for Adults as approved by the Pharmacy and Therapeutics Committee. The patient met all eligible criteria:  1) Age = 18 years old   2) Received at least one dose of the IV form   3) Receiving at least one other scheduled oral/enteral medication   4) Tolerating an oral/enteral diet   and did not have any exclusions:   1) Critical care patient   2) Active GI bleed (IF assessing H2RAs or PPIs)   3) Continuous tube feeding (IF assessing cipro, doxycycline, levofloxacin, minocycline, rifampin, or voriconazole)   4) Receiving PO vancomycin (IF assessing metronidazole)   5) Persistent nausea and/or vomiting   6) Ileus or gastrointestinal obstruction   7) Will/nasogastric tube set for continuous suction   8) Specific order not to automatically convert to PO (in the order's comments or if discussed in the most recent Infectious Disease or primary team's progress notes).
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
## 2024-01-11 NOTE — ASSESSMENT & PLAN NOTE
Results from last 7 days   Lab Units 01/11/24  0855 01/10/24  0430 01/09/24  0535 01/08/24  0523   POTASSIUM mmol/L 3.1* 2.8* 3.2* 3.1*   MAGNESIUM mg/dL 6.6* 1.3* 1.4* 1.9   Was treating for hypomagnesemia and hypokalemia  Potassium continues to be low-will continue to replete  Repleted magnesium yesterday  Noted elevated level today  Recheck later this afternoon and continue to follow  Discontinue Tums

## 2024-01-11 NOTE — PLAN OF CARE
Problem: Prexisting or High Potential for Compromised Skin Integrity  Goal: Skin integrity is maintained or improved  Description: INTERVENTIONS:  - Identify patients at risk for skin breakdown  - Assess and monitor skin integrity  - Assess and monitor nutrition and hydration status  - Monitor labs   - Assess for incontinence   - Turn and reposition patient  - Assist with mobility/ambulation  - Relieve pressure over bony prominences  - Avoid friction and shearing  - Provide appropriate hygiene as needed including keeping skin clean and dry  - Evaluate need for skin moisturizer/barrier cream  - Collaborate with interdisciplinary team   - Patient/family teaching  - Consider wound care consult   Outcome: Progressing     Problem: PAIN - ADULT  Goal: Verbalizes/displays adequate comfort level or baseline comfort level  Description: Interventions:  - Encourage patient to monitor pain and request assistance  - Assess pain using appropriate pain scale  - Administer analgesics based on type and severity of pain and evaluate response  - Implement non-pharmacological measures as appropriate and evaluate response  - Consider cultural and social influences on pain and pain management  - Notify physician/advanced practitioner if interventions unsuccessful or patient reports new pain  Outcome: Progressing     Problem: INFECTION - ADULT  Goal: Absence or prevention of progression during hospitalization  Description: INTERVENTIONS:  - Assess and monitor for signs and symptoms of infection  - Monitor lab/diagnostic results  - Monitor all insertion sites, i.e. indwelling lines, tubes, and drains  - Monitor endotracheal if appropriate and nasal secretions for changes in amount and color  - [place] appropriate cooling/warming therapies per order  - Administer medications as ordered  - Instruct and encourage patient and family to use good hand hygiene technique  - Identify and instruct in appropriate isolation precautions for  identified infection/condition  Outcome: Progressing  Goal: Absence of fever/infection during neutropenic period  Description: INTERVENTIONS:  - Monitor WBC    Outcome: Progressing     Problem: SAFETY ADULT  Goal: Patient will remain free of falls  Description: INTERVENTIONS:  - Educate patient/family on patient safety including physical limitations  - Instruct patient to call for assistance with activity   - Consult OT/PT to assist with strengthening/mobility   - Keep Call bell within reach  - Keep bed low and locked with side rails adjusted as appropriate  - Keep care items and personal belongings within reach  - Initiate and maintain comfort rounds  - Make Fall Risk Sign visible to staff  - Offer Toileting every 2 Hours, in advance of need  - Initiate/Maintain bed alarm  - Obtain necessary fall risk management equipment: alarm  - Apply yellow socks and bracelet for high fall risk patients  - Consider moving patient to room near nurses station  Outcome: Progressing  Goal: Maintain or return to baseline ADL function  Description: INTERVENTIONS:  -  Assess patient's ability to carry out ADLs; assess patient's baseline for ADL function and identify physical deficits which impact ability to perform ADLs (bathing, care of mouth/teeth, toileting, grooming, dressing, etc.)  - Assess/evaluate cause of self-care deficits   - Assess range of motion  - Assess patient's mobility; develop plan if impaired  - Assess patient's need for assistive devices and provide as appropriate  - Encourage maximum independence but intervene and supervise when necessary  - Involve family in performance of ADLs  - Assess for home care needs following discharge   - Consider OT consult to assist with ADL evaluation and planning for discharge  - Provide patient education as appropriate  Outcome: Progressing  Goal: Maintains/Returns to pre admission functional level  Description: INTERVENTIONS:  - Perform AM-PAC 6 Click Basic Mobility/ Daily  Activity assessment daily.  - Set and communicate daily mobility goal to care team and patient/family/caregiver.   - Collaborate with rehabilitation services on mobility goals if consulted  - Perform Range of Motion 2 times a day.  - Reposition patient every 2 hours.  - Out of bed for toileting  - Record patient progress and toleration of activity level   Outcome: Progressing     Problem: DISCHARGE PLANNING  Goal: Discharge to home or other facility with appropriate resources  Description: INTERVENTIONS:  - Identify barriers to discharge w/patient and caregiver  - Arrange for needed discharge resources and transportation as appropriate  - Identify discharge learning needs (meds, wound care, etc.)  - Arrange for interpretive services to assist at discharge as needed  - Refer to Case Management Department for coordinating discharge planning if the patient needs post-hospital services based on physician/advanced practitioner order or complex needs related to functional status, cognitive ability, or social support system  Outcome: Progressing     Problem: Knowledge Deficit  Goal: Patient/family/caregiver demonstrates understanding of disease process, treatment plan, medications, and discharge instructions  Description: Complete learning assessment and assess knowledge base.  Interventions:  - Provide teaching at level of understanding  - Provide teaching via preferred learning methods  Outcome: Progressing     Problem: Nutrition/Hydration-ADULT  Goal: Nutrient/Hydration intake appropriate for improving, restoring or maintaining nutritional needs  Description: Monitor and assess patient's nutrition/hydration status for malnutrition. Collaborate with interdisciplinary team and initiate plan and interventions as ordered.  Monitor patient's weight and dietary intake as ordered or per policy. Utilize nutrition screening tool and intervene as necessary. Determine patient's food preferences and provide high-protein,  high-caloric foods as appropriate.     INTERVENTIONS:  - Monitor oral intake, urinary output, labs, and treatment plans  - Assess nutrition and hydration status and recommend course of action  - Evaluate amount of meals eaten  - Assist patient with eating if necessary   - Allow adequate time for meals  - Recommend/ encourage appropriate diets, oral nutritional supplements, and vitamin/mineral supplements  - Order, calculate, and assess calorie counts as needed  - Recommend, monitor, and adjust tube feedings and TPN/PPN based on assessed needs  - Assess need for intravenous fluids  - Provide specific nutrition/hydration education as appropriate  - Include patient/family/caregiver in decisions related to nutrition  Outcome: Progressing     Problem: NEUROSENSORY - ADULT  Goal: Achieves stable or improved neurological status  Description: INTERVENTIONS  - Monitor and report changes in neurological status  - Monitor vital signs such as temperature, blood pressure, glucose, and any other labs ordered   - Initiate measures to prevent increased intracranial pressure  - Monitor for seizure activity and implement precautions if appropriate      Outcome: Progressing  Goal: Achieves maximal functionality and self care  Description: INTERVENTIONS  - Monitor swallowing and airway patency with patient fatigue and changes in neurological status  - Encourage and assist patient to increase activity and self care.   - Encourage visually impaired, hearing impaired and aphasic patients to use assistive/communication devices  Outcome: Progressing     Problem: GASTROINTESTINAL - ADULT  Goal: Minimal or absence of nausea and/or vomiting  Description: INTERVENTIONS:  - Administer IV fluids if ordered to ensure adequate hydration  - Maintain NPO status until nausea and vomiting are resolved  - Nasogastric tube if ordered  - Administer ordered antiemetic medications as needed  - Provide nonpharmacologic comfort measures as appropriate  -  Advance diet as tolerated, if ordered  - Consider nutrition services referral to assist patient with adequate nutrition and appropriate food choices  Outcome: Progressing  Goal: Maintains adequate nutritional intake  Description: INTERVENTIONS:  - Monitor percentage of each meal consumed  - Identify factors contributing to decreased intake, treat as appropriate  - Assist with meals as needed  - Monitor I&O, weight, and lab values if indicated  - Obtain nutrition services referral as needed  Outcome: Progressing  Goal: Oral mucous membranes remain intact  Description: INTERVENTIONS  - Assess oral mucosa and hygiene practices  - Implement preventative oral hygiene regimen  - Implement oral medicated treatments as ordered  - Initiate Nutrition services referral as needed  Outcome: Progressing     Problem: GENITOURINARY - ADULT  Goal: Absence of urinary retention  Description: INTERVENTIONS:  - Assess patient’s ability to void and empty bladder  - Monitor I/O  - Bladder scan as needed  - Discuss with physician/AP medications to alleviate retention as needed  - Discuss catheterization for long term situations as appropriate  Outcome: Progressing     Problem: METABOLIC, FLUID AND ELECTROLYTES - ADULT  Goal: Electrolytes maintained within normal limits  Description: INTERVENTIONS:  - Monitor labs and assess patient for signs and symptoms of electrolyte imbalances  - Administer electrolyte replacement as ordered  - Monitor response to electrolyte replacements, including repeat lab results as appropriate  - Instruct patient on fluid and nutrition as appropriate  Outcome: Progressing  Goal: Fluid balance maintained  Description: INTERVENTIONS:  - Monitor labs   - Monitor I/O and WT  - Instruct patient on fluid and nutrition as appropriate  - Assess for signs & symptoms of volume excess or deficit  Outcome: Progressing     Problem: SKIN/TISSUE INTEGRITY - ADULT  Problem: MUSCULOSKELETAL - ADULT  Goal: Maintain or return  mobility to safest level of function  Description: INTERVENTIONS:  - Assess patient's ability to carry out ADLs; assess patient's baseline for ADL function and identify physical deficits which impact ability to perform ADLs (bathing, care of mouth/teeth, toileting, grooming, dressing, etc.)  - Assess/evaluate cause of self-care deficits   - Assess range of motion  - Assess patient's mobility  - Assess patient's need for assistive devices and provide as appropriate  - Encourage maximum independence but intervene and supervise when necessary  - Involve family in performance of ADLs  - Assess for home care needs following discharge   - Consider OT consult to assist with ADL evaluation and planning for discharge  - Provide patient education as appropriate  Outcome: Progressing

## 2024-01-12 PROBLEM — R82.71 BACTERIA IN URINE: Status: RESOLVED | Noted: 2024-01-08 | Resolved: 2024-01-12

## 2024-01-12 PROBLEM — I95.9 HYPOTENSION: Status: RESOLVED | Noted: 2024-01-08 | Resolved: 2024-01-12

## 2024-01-12 LAB
ALBUMIN SERPL BCP-MCNC: 3.5 G/DL (ref 3.5–5)
ALP SERPL-CCNC: 123 U/L (ref 34–104)
ALT SERPL W P-5'-P-CCNC: 14 U/L (ref 7–52)
AMMONIA PLAS-SCNC: 135 UMOL/L (ref 18–72)
ANION GAP SERPL CALCULATED.3IONS-SCNC: 8 MMOL/L
AST SERPL W P-5'-P-CCNC: 43 U/L (ref 13–39)
ATRIAL RATE: 72 BPM
BILIRUB SERPL-MCNC: 3.15 MG/DL (ref 0.2–1)
BUN SERPL-MCNC: 3 MG/DL (ref 5–25)
CALCIUM SERPL-MCNC: 8.3 MG/DL (ref 8.4–10.2)
CHLORIDE SERPL-SCNC: 103 MMOL/L (ref 96–108)
CO2 SERPL-SCNC: 19 MMOL/L (ref 21–32)
CREAT SERPL-MCNC: 0.28 MG/DL (ref 0.6–1.3)
ERYTHROCYTE [DISTWIDTH] IN BLOOD BY AUTOMATED COUNT: 15.2 % (ref 11.6–15.1)
FOLATE SERPL-MCNC: 18.9 NG/ML
GFR SERPL CREATININE-BSD FRML MDRD: 139 ML/MIN/1.73SQ M
GLUCOSE SERPL-MCNC: 127 MG/DL (ref 65–140)
GLUCOSE SERPL-MCNC: 84 MG/DL (ref 65–140)
GLUCOSE SERPL-MCNC: 92 MG/DL (ref 65–140)
HCT VFR BLD AUTO: 25.3 % (ref 34.8–46.1)
HGB BLD-MCNC: 8.6 G/DL (ref 11.5–15.4)
MAGNESIUM SERPL-MCNC: 1.2 MG/DL (ref 1.9–2.7)
MAGNESIUM SERPL-MCNC: 1.9 MG/DL (ref 1.9–2.7)
MCH RBC QN AUTO: 31 PG (ref 26.8–34.3)
MCHC RBC AUTO-ENTMCNC: 34 G/DL (ref 31.4–37.4)
MCV RBC AUTO: 91 FL (ref 82–98)
OSMOLALITY UR/SERPL-RTO: 267 MMOL/KG (ref 282–298)
OSMOLALITY UR: 248 MMOL/KG
P AXIS: 39 DEGREES
PLATELET # BLD AUTO: 168 THOUSANDS/UL (ref 149–390)
PMV BLD AUTO: 11.1 FL (ref 8.9–12.7)
POTASSIUM SERPL-SCNC: 3 MMOL/L (ref 3.5–5.3)
PR INTERVAL: 166 MS
PROT SERPL-MCNC: 5.3 G/DL (ref 6.4–8.4)
QRS AXIS: 101 DEGREES
QRSD INTERVAL: 84 MS
QT INTERVAL: 394 MS
QTC INTERVAL: 431 MS
RBC # BLD AUTO: 2.77 MILLION/UL (ref 3.81–5.12)
SODIUM 24H UR-SCNC: 85 MOL/L
SODIUM SERPL-SCNC: 130 MMOL/L (ref 135–147)
T WAVE AXIS: -6 DEGREES
VENTRICULAR RATE: 72 BPM
VIT B12 SERPL-MCNC: 3555 PG/ML (ref 180–914)
WBC # BLD AUTO: 8.92 THOUSAND/UL (ref 4.31–10.16)

## 2024-01-12 PROCEDURE — 82948 REAGENT STRIP/BLOOD GLUCOSE: CPT

## 2024-01-12 PROCEDURE — 82607 VITAMIN B-12: CPT

## 2024-01-12 PROCEDURE — 80053 COMPREHEN METABOLIC PANEL: CPT | Performed by: PHYSICIAN ASSISTANT

## 2024-01-12 PROCEDURE — 82746 ASSAY OF FOLIC ACID SERUM: CPT

## 2024-01-12 PROCEDURE — 83935 ASSAY OF URINE OSMOLALITY: CPT

## 2024-01-12 PROCEDURE — 82140 ASSAY OF AMMONIA: CPT | Performed by: PHYSICIAN ASSISTANT

## 2024-01-12 PROCEDURE — 84300 ASSAY OF URINE SODIUM: CPT

## 2024-01-12 PROCEDURE — 83735 ASSAY OF MAGNESIUM: CPT | Performed by: PHYSICIAN ASSISTANT

## 2024-01-12 PROCEDURE — 85027 COMPLETE CBC AUTOMATED: CPT | Performed by: PHYSICIAN ASSISTANT

## 2024-01-12 PROCEDURE — 83930 ASSAY OF BLOOD OSMOLALITY: CPT

## 2024-01-12 PROCEDURE — 93005 ELECTROCARDIOGRAM TRACING: CPT

## 2024-01-12 PROCEDURE — 83735 ASSAY OF MAGNESIUM: CPT

## 2024-01-12 PROCEDURE — 99232 SBSQ HOSP IP/OBS MODERATE 35: CPT

## 2024-01-12 RX ORDER — POTASSIUM CHLORIDE 20 MEQ/1
40 TABLET, EXTENDED RELEASE ORAL 2 TIMES DAILY
Status: DISCONTINUED | OUTPATIENT
Start: 2024-01-12 | End: 2024-01-17

## 2024-01-12 RX ORDER — LACTULOSE 10 G/15ML
10 SOLUTION ORAL DAILY
Status: DISCONTINUED | OUTPATIENT
Start: 2024-01-13 | End: 2024-01-14

## 2024-01-12 RX ORDER — LORAZEPAM 2 MG/ML
0.5 INJECTION INTRAMUSCULAR DAILY PRN
Status: DISCONTINUED | OUTPATIENT
Start: 2024-01-12 | End: 2024-01-13

## 2024-01-12 RX ORDER — GABAPENTIN 100 MG/1
100 CAPSULE ORAL 3 TIMES DAILY
Status: DISCONTINUED | OUTPATIENT
Start: 2024-01-12 | End: 2024-01-12

## 2024-01-12 RX ORDER — MAGNESIUM SULFATE HEPTAHYDRATE 40 MG/ML
2 INJECTION, SOLUTION INTRAVENOUS ONCE
Status: COMPLETED | OUTPATIENT
Start: 2024-01-12 | End: 2024-01-12

## 2024-01-12 RX ADMIN — POTASSIUM CHLORIDE 40 MEQ: 1500 TABLET, EXTENDED RELEASE ORAL at 18:01

## 2024-01-12 RX ADMIN — RIFAXIMIN 550 MG: 550 TABLET ORAL at 08:35

## 2024-01-12 RX ADMIN — THIAMINE HCL TAB 100 MG 100 MG: 100 TAB at 08:32

## 2024-01-12 RX ADMIN — ESCITALOPRAM OXALATE 10 MG: 10 TABLET ORAL at 08:32

## 2024-01-12 RX ADMIN — PANTOPRAZOLE SODIUM 40 MG: 40 TABLET, DELAYED RELEASE ORAL at 05:11

## 2024-01-12 RX ADMIN — RIFAXIMIN 550 MG: 550 TABLET ORAL at 22:21

## 2024-01-12 RX ADMIN — CEFTRIAXONE 1000 MG: 1 INJECTION, SOLUTION INTRAVENOUS at 10:57

## 2024-01-12 RX ADMIN — Medication 1 TABLET: at 08:32

## 2024-01-12 RX ADMIN — MAGNESIUM SULFATE HEPTAHYDRATE 2 G: 40 INJECTION, SOLUTION INTRAVENOUS at 08:30

## 2024-01-12 RX ADMIN — ONDANSETRON 4 MG: 2 INJECTION INTRAMUSCULAR; INTRAVENOUS at 01:48

## 2024-01-12 RX ADMIN — FOLIC ACID 1 MG: 1 TABLET ORAL at 08:33

## 2024-01-12 RX ADMIN — POTASSIUM CHLORIDE 40 MEQ: 1500 TABLET, EXTENDED RELEASE ORAL at 08:32

## 2024-01-12 NOTE — ASSESSMENT & PLAN NOTE
Malnutrition Findings:   Adult Malnutrition type: Chronic illness  Adult Degree of Malnutrition: Other severe protein calorie malnutrition  Malnutrition Characteristics: Muscle loss, Fat loss  360 Statement: severe protein calorie malnutrition related to inadequate protein intake and lack of nutrient-dense foods with excessive ETOH intake as evidenced by  consumption of 8-10 beers daily and hard liquor although she can not quantify how much liquor; severe muscle wasting(temporalis, pectoralis, fat loss (orbital fat pads, triceps), treated with TBD  BMI Findings:  Adult BMI Classifications: Underweight < 18.5  Body mass index is 18.1 kg/m².

## 2024-01-12 NOTE — PLAN OF CARE
Problem: Prexisting or High Potential for Compromised Skin Integrity  Goal: Skin integrity is maintained or improved  Description: INTERVENTIONS:  - Identify patients at risk for skin breakdown  - Assess and monitor skin integrity  - Assess and monitor nutrition and hydration status  - Monitor labs   - Assess for incontinence   - Turn and reposition patient  - Assist with mobility/ambulation  - Relieve pressure over bony prominences  - Avoid friction and shearing  - Provide appropriate hygiene as needed including keeping skin clean and dry  - Evaluate need for skin moisturizer/barrier cream  - Collaborate with interdisciplinary team   - Patient/family teaching  - Consider wound care consult   Outcome: Progressing     Problem: PAIN - ADULT  Goal: Verbalizes/displays adequate comfort level or baseline comfort level  Description: Interventions:  - Encourage patient to monitor pain and request assistance  - Assess pain using appropriate pain scale  - Administer analgesics based on type and severity of pain and evaluate response  - Implement non-pharmacological measures as appropriate and evaluate response  - Consider cultural and social influences on pain and pain management  - Notify physician/advanced practitioner if interventions unsuccessful or patient reports new pain  Outcome: Progressing     Problem: INFECTION - ADULT  Goal: Absence or prevention of progression during hospitalization  Description: INTERVENTIONS:  - Assess and monitor for signs and symptoms of infection  - Monitor lab/diagnostic results  - Monitor all insertion sites, i.e. indwelling lines, tubes, and drains  - Monitor endotracheal if appropriate and nasal secretions for changes in amount and color  - [place] appropriate cooling/warming therapies per order  - Administer medications as ordered  - Instruct and encourage patient and family to use good hand hygiene technique  - Identify and instruct in appropriate isolation precautions for  identified infection/condition  Outcome: Progressing  Goal: Absence of fever/infection during neutropenic period  Description: INTERVENTIONS:  - Monitor WBC    Outcome: Progressing     Problem: SAFETY ADULT  Goal: Patient will remain free of falls  Description: INTERVENTIONS:  - Educate patient/family on patient safety including physical limitations  - Instruct patient to call for assistance with activity   - Consult OT/PT to assist with strengthening/mobility   - Keep Call bell within reach  - Keep bed low and locked with side rails adjusted as appropriate  - Keep care items and personal belongings within reach  - Initiate and maintain comfort rounds  - Make Fall Risk Sign visible to staff  - Offer Toileting every 2 Hours, in advance of need  - Initiate/Maintain bed alarm  - Obtain necessary fall risk management equipment: bed alarm   - Apply yellow socks and bracelet for high fall risk patients  - Consider moving patient to room near nurses station  Outcome: Progressing  Goal: Maintain or return to baseline ADL function  Description: INTERVENTIONS:  -  Assess patient's ability to carry out ADLs; assess patient's baseline for ADL function and identify physical deficits which impact ability to perform ADLs (bathing, care of mouth/teeth, toileting, grooming, dressing, etc.)  - Assess/evaluate cause of self-care deficits   - Assess range of motion  - Assess patient's mobility; develop plan if impaired  - Assess patient's need for assistive devices and provide as appropriate  - Encourage maximum independence but intervene and supervise when necessary  - Involve family in performance of ADLs  - Assess for home care needs following discharge   - Consider OT consult to assist with ADL evaluation and planning for discharge  - Provide patient education as appropriate  Outcome: Progressing  Goal: Maintains/Returns to pre admission functional level  Description: INTERVENTIONS:  - Perform AM-PAC 6 Click Basic Mobility/ Daily  Activity assessment daily.  - Set and communicate daily mobility goal to care team and patient/family/caregiver.   - Collaborate with rehabilitation services on mobility goals if consulted  - Perform Range of Motion 4 times a day.  - Reposition patient every 2 hours.  - Dangle patient 4 times a day  - Stand patient 4 times a day  - Ambulate patient 4 times a day  - Out of bed to chair 4 times a day   - Out of bed for meals 4 times a day  - Out of bed for toileting  - Record patient progress and toleration of activity level   Outcome: Progressing     Problem: DISCHARGE PLANNING  Goal: Discharge to home or other facility with appropriate resources  Description: INTERVENTIONS:  - Identify barriers to discharge w/patient and caregiver  - Arrange for needed discharge resources and transportation as appropriate  - Identify discharge learning needs (meds, wound care, etc.)  - Arrange for interpretive services to assist at discharge as needed  - Refer to Case Management Department for coordinating discharge planning if the patient needs post-hospital services based on physician/advanced practitioner order or complex needs related to functional status, cognitive ability, or social support system  Outcome: Progressing     Problem: Knowledge Deficit  Goal: Patient/family/caregiver demonstrates understanding of disease process, treatment plan, medications, and discharge instructions  Description: Complete learning assessment and assess knowledge base.  Interventions:  - Provide teaching at level of understanding  - Provide teaching via preferred learning methods  Outcome: Progressing     Problem: Nutrition/Hydration-ADULT  Goal: Nutrient/Hydration intake appropriate for improving, restoring or maintaining nutritional needs  Description: Monitor and assess patient's nutrition/hydration status for malnutrition. Collaborate with interdisciplinary team and initiate plan and interventions as ordered.  Monitor patient's weight and  dietary intake as ordered or per policy. Utilize nutrition screening tool and intervene as necessary. Determine patient's food preferences and provide high-protein, high-caloric foods as appropriate.     INTERVENTIONS:  - Monitor oral intake, urinary output, labs, and treatment plans  - Assess nutrition and hydration status and recommend course of action  - Evaluate amount of meals eaten  - Assist patient with eating if necessary   - Allow adequate time for meals  - Recommend/ encourage appropriate diets, oral nutritional supplements, and vitamin/mineral supplements  - Order, calculate, and assess calorie counts as needed  - Recommend, monitor, and adjust tube feedings and TPN/PPN based on assessed needs  - Assess need for intravenous fluids  - Provide specific nutrition/hydration education as appropriate  - Include patient/family/caregiver in decisions related to nutrition  Outcome: Progressing     Problem: NEUROSENSORY - ADULT  Goal: Achieves stable or improved neurological status  Description: INTERVENTIONS  - Monitor and report changes in neurological status  - Monitor vital signs such as temperature, blood pressure, glucose, and any other labs ordered   - Initiate measures to prevent increased intracranial pressure  - Monitor for seizure activity and implement precautions if appropriate      Outcome: Progressing  Goal: Achieves maximal functionality and self care  Description: INTERVENTIONS  - Monitor swallowing and airway patency with patient fatigue and changes in neurological status  - Encourage and assist patient to increase activity and self care.   - Encourage visually impaired, hearing impaired and aphasic patients to use assistive/communication devices  Outcome: Progressing     Problem: GASTROINTESTINAL - ADULT  Goal: Minimal or absence of nausea and/or vomiting  Description: INTERVENTIONS:  - Administer IV fluids if ordered to ensure adequate hydration  - Maintain NPO status until nausea and vomiting  are resolved  - Nasogastric tube if ordered  - Administer ordered antiemetic medications as needed  - Provide nonpharmacologic comfort measures as appropriate  - Advance diet as tolerated, if ordered  - Consider nutrition services referral to assist patient with adequate nutrition and appropriate food choices  Outcome: Progressing  Goal: Maintains adequate nutritional intake  Description: INTERVENTIONS:  - Monitor percentage of each meal consumed  - Identify factors contributing to decreased intake, treat as appropriate  - Assist with meals as needed  - Monitor I&O, weight, and lab values if indicated  - Obtain nutrition services referral as needed  Outcome: Progressing  Goal: Oral mucous membranes remain intact  Description: INTERVENTIONS  - Assess oral mucosa and hygiene practices  - Implement preventative oral hygiene regimen  - Implement oral medicated treatments as ordered  - Initiate Nutrition services referral as needed  Outcome: Progressing     Problem: GENITOURINARY - ADULT  Goal: Absence of urinary retention  Description: INTERVENTIONS:  - Assess patient’s ability to void and empty bladder  - Monitor I/O  - Bladder scan as needed  - Discuss with physician/AP medications to alleviate retention as needed  - Discuss catheterization for long term situations as appropriate  Outcome: Progressing     Problem: METABOLIC, FLUID AND ELECTROLYTES - ADULT  Goal: Electrolytes maintained within normal limits  Description: INTERVENTIONS:  - Monitor labs and assess patient for signs and symptoms of electrolyte imbalances  - Administer electrolyte replacement as ordered  - Monitor response to electrolyte replacements, including repeat lab results as appropriate  - Instruct patient on fluid and nutrition as appropriate  Outcome: Progressing  Goal: Fluid balance maintained  Description: INTERVENTIONS:  - Monitor labs   - Monitor I/O and WT  - Instruct patient on fluid and nutrition as appropriate  - Assess for signs &  symptoms of volume excess or deficit  Outcome: Progressing     Problem: SKIN/TISSUE INTEGRITY - ADULT  Goal: Skin Integrity remains intact(Skin Breakdown Prevention)  Description: Assess:  -Perform [place] assessment every   -Clean and moisturize skin every   -Inspect skin when repositioning, toileting, and assisting with ADLS  -Assess under medical devices such as  every   -Assess extremities for adequate circulation and sensation     Bed Management:  -Have minimal linens on bed & keep smooth, unwrinkled  -Change linens as needed when moist or perspiring  -Avoid sitting or lying in one position for more than  hours while in bed  -Keep HOB at degrees     Toileting:  -Offer bedside commode  -Assess for incontinence every   -Use incontinent care products after each incontinent episode such as     Activity:  -Mobilize patient  times a day  -Encourage activity and walks on unit  -Encourage or provide ROM exercises   -Turn and reposition patient every  Hours  -Use appropriate equipment to lift or move patient in bed  -Instruct/ Assist with weight shifting every  when out of bed in chair  -Consider limitation of chair time  hour intervals    Skin Care:  -Avoid use of baby powder, tape, friction and shearing, hot water or constrictive clothing  -Relieve pressure over bony prominences using   -Do not massage red bony areas    Next Steps:  -Teach patient strategies to minimize risks such as    -Consider consults to  interdisciplinary teams such as   Outcome: Progressing     Problem: MUSCULOSKELETAL - ADULT  Goal: Maintain or return mobility to safest level of function  Description: INTERVENTIONS:  - Assess patient's ability to carry out ADLs; assess patient's baseline for ADL function and identify physical deficits which impact ability to perform ADLs (bathing, care of mouth/teeth, toileting, grooming, dressing, etc.)  - Assess/evaluate cause of self-care deficits   - Assess range of motion  - Assess patient's mobility  -  Assess patient's need for assistive devices and provide as appropriate  - Encourage maximum independence but intervene and supervise when necessary  - Involve family in performance of ADLs  - Assess for home care needs following discharge   - Consider OT consult to assist with ADL evaluation and planning for discharge  - Provide patient education as appropriate  Outcome: Progressing

## 2024-01-12 NOTE — ASSESSMENT & PLAN NOTE
Patient was significantly encephalopathic, lethargic with asterixis on exam  Received thiamine protocol for concern for [place]'s  Ammonia level noted to be 178 on admission  Was started on lactulose and Xifaxan  Patient alert & oriented x 3 today, RESOLVED  Lowering lactulose dosage  Monitor mentation while decreasing lactulose

## 2024-01-12 NOTE — ASSESSMENT & PLAN NOTE
Results from last 7 days   Lab Units 01/12/24  0528 01/11/24  1321 01/11/24  0855 01/10/24  0430   POTASSIUM mmol/L 3.0* 3.4* 3.1* 2.8*   MAGNESIUM mg/dL 1.2* 1.5* 6.6* 1.3*   Was treating for hypomagnesemia and hypokalemia  Potassium continues to be low likely in setting of GI loss and poor oral intake, will start 40 mEq twice daily today  Replete Mag IV, recheck levels in afternoon  Decreasing lactulose  Monitor intake and output
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
## 2024-01-12 NOTE — ASSESSMENT & PLAN NOTE
Pt has h/o chronic anemia, likely due to anemia of chronic disease with baseline Hb appros 10-11  Pt developed ABLA in the setting of hematemesis and Hb dropped to 7.2  Was transfused 1u PRBC on 1/8 with improvement in Hgb  GI evaluating UGI bleed:  Underwent EGD without evidence of bleeding  Current hemoglobin stable at 8.6  No signs or symptoms of bleeding per patient  Iron panel elevated ferritin displaying anemia chronic disease  Will check B12 and folate today for completeness

## 2024-01-12 NOTE — ASSESSMENT & PLAN NOTE
Status post PRBC, IVF and IV albumin  Blood pressure soft but improving, remains in 90's  Patient asymptomatic, not on antihypertensives  Monitor blood pressure

## 2024-01-12 NOTE — ASSESSMENT & PLAN NOTE
Pt presented to [facility] Detox unit with acute alcoholic hepatitis  Initial  and T bili 4.7  Pt was tx with supportive measures:  discriminant function <32 and steroids not indicated  Repeated discriminant function:  5--> steroids again not indicated  AST improved to 43, T. bili 3.5  Trend HFP
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
## 2024-01-12 NOTE — ASSESSMENT & PLAN NOTE
For a pt is a 47 yo female with PMH significant for alcohol abuse, hepatic steatosis, and UC who was transferred for [facility] detox unit to [facility] ICU on 1/5 for hematemesis  Pt was evaluated by GI team and initially placed on octreotide infusion  CT scan with evidence of varices and portal HTN  Received IV Protonix, and Rocephin for SBP prophylaxis (completed day 7 today)  Octreotide drip discontinued  Had associated ABLA and hypotension and was transfused 1u PRBC on 1/8  Underwent EGD 1/8/2024-normal esophagus, no esophageal or gastric varices, 3 cm hiatal hernia, likely Bill's esophagus, mild portal hypertensive gastropathy and body of stomach, first part of duodenum and second part of duodenum appearing normal.  No old or fresh blood.  Return to floor, resume diet, continue oral PPI daily  No further signs or evidence of bleeding.  Hemoglobin stable 8.6

## 2024-01-12 NOTE — ASSESSMENT & PLAN NOTE
Urine culture polymicrobial, however corresponding UA without evidence of infection  Positive cx due to bacturia/colonization  UA collected prior to any antibiotics  No UTI sx  Dedicated abx not indicated
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
## 2024-01-12 NOTE — ASSESSMENT & PLAN NOTE
Long history of anxiety.  Was on Paxil and Ativan long-term.  Removed off Paxil due to pregnancy, then suffered with postpartum depression was resumed.  Anxiety was not well-controlled and patient reports she turned to drinking to relax and sleep better at night  Started on Lexapro 10 mg daily, patient agreeable  Ativan as needed  Supportive care bedside  Will ask for psychiatry evaluation for further recommendations

## 2024-01-12 NOTE — ASSESSMENT & PLAN NOTE
Likely combination of poor oral intake, low solute intake, excessive free water due to beer potomania and GI losses with lactulose, reports 6 loose bowel movements yesterday  Goal BM 2-3 times a day  Discussed with GI, decrease lactulose to 10 g from 20  Sent hyponatremia studies  Monitor CMP

## 2024-01-12 NOTE — ASSESSMENT & PLAN NOTE
Patient was initially admitted to [facility] detox on 1/4/24 and was treated with SEWS protocol with phenobarbital  She was transferred to [facility] ICU 1/6/24 for evaluation of GI bleed   She was started on serax but it was further discontinued 1/7 due to somnolence  S/p CIWA protocol  Pt received iv thiamine 500mg IV q8h x 2 days, then 250mg IV daily x 5 days  Now on oral thiamine and folic acid  Feeling anxious again today-given extra dose of IV Ativan  Discussion of alcohol cessation bedside with patient today.  She reports she has done drinking.  Most of her drinking stems from anxiety.  Started on Lexapro here.  Discussed with patient she would benefit from HOST, will give this some thought and talk with CM

## 2024-01-13 LAB
ALBUMIN SERPL BCP-MCNC: 3.6 G/DL (ref 3.5–5)
ALP SERPL-CCNC: 122 U/L (ref 34–104)
ALT SERPL W P-5'-P-CCNC: 19 U/L (ref 7–52)
ANION GAP SERPL CALCULATED.3IONS-SCNC: 8 MMOL/L
AST SERPL W P-5'-P-CCNC: 81 U/L (ref 13–39)
ATRIAL RATE: 63 BPM
BILIRUB SERPL-MCNC: 3.16 MG/DL (ref 0.2–1)
BUN SERPL-MCNC: 4 MG/DL (ref 5–25)
CALCIUM SERPL-MCNC: 8.3 MG/DL (ref 8.4–10.2)
CHLORIDE SERPL-SCNC: 105 MMOL/L (ref 96–108)
CO2 SERPL-SCNC: 17 MMOL/L (ref 21–32)
CREAT SERPL-MCNC: 0.39 MG/DL (ref 0.6–1.3)
ERYTHROCYTE [DISTWIDTH] IN BLOOD BY AUTOMATED COUNT: 15.5 % (ref 11.6–15.1)
GFR SERPL CREATININE-BSD FRML MDRD: 125 ML/MIN/1.73SQ M
GLUCOSE SERPL-MCNC: 107 MG/DL (ref 65–140)
GLUCOSE SERPL-MCNC: 110 MG/DL (ref 65–140)
GLUCOSE SERPL-MCNC: 121 MG/DL (ref 65–140)
HCT VFR BLD AUTO: 27 % (ref 34.8–46.1)
HGB BLD-MCNC: 9.3 G/DL (ref 11.5–15.4)
MAGNESIUM SERPL-MCNC: 1.3 MG/DL (ref 1.9–2.7)
MCH RBC QN AUTO: 32.1 PG (ref 26.8–34.3)
MCHC RBC AUTO-ENTMCNC: 34.4 G/DL (ref 31.4–37.4)
MCV RBC AUTO: 93 FL (ref 82–98)
P AXIS: 61 DEGREES
PLATELET # BLD AUTO: 232 THOUSANDS/UL (ref 149–390)
PMV BLD AUTO: 10.8 FL (ref 8.9–12.7)
POTASSIUM SERPL-SCNC: 3.3 MMOL/L (ref 3.5–5.3)
PR INTERVAL: 156 MS
PROT SERPL-MCNC: 6.2 G/DL (ref 6.4–8.4)
QRS AXIS: 84 DEGREES
QRSD INTERVAL: 72 MS
QT INTERVAL: 418 MS
QTC INTERVAL: 427 MS
RBC # BLD AUTO: 2.9 MILLION/UL (ref 3.81–5.12)
SODIUM SERPL-SCNC: 130 MMOL/L (ref 135–147)
T WAVE AXIS: 36 DEGREES
VENTRICULAR RATE: 63 BPM
WBC # BLD AUTO: 10.68 THOUSAND/UL (ref 4.31–10.16)

## 2024-01-13 PROCEDURE — 97535 SELF CARE MNGMENT TRAINING: CPT

## 2024-01-13 PROCEDURE — 99232 SBSQ HOSP IP/OBS MODERATE 35: CPT

## 2024-01-13 PROCEDURE — 93005 ELECTROCARDIOGRAM TRACING: CPT

## 2024-01-13 PROCEDURE — 85027 COMPLETE CBC AUTOMATED: CPT

## 2024-01-13 PROCEDURE — 97530 THERAPEUTIC ACTIVITIES: CPT

## 2024-01-13 PROCEDURE — 82948 REAGENT STRIP/BLOOD GLUCOSE: CPT

## 2024-01-13 PROCEDURE — 83735 ASSAY OF MAGNESIUM: CPT

## 2024-01-13 PROCEDURE — 80053 COMPREHEN METABOLIC PANEL: CPT

## 2024-01-13 RX ORDER — LORAZEPAM 0.5 MG/1
0.5 TABLET ORAL DAILY PRN
Status: DISCONTINUED | OUTPATIENT
Start: 2024-01-13 | End: 2024-01-20 | Stop reason: HOSPADM

## 2024-01-13 RX ORDER — MAGNESIUM SULFATE HEPTAHYDRATE 40 MG/ML
2 INJECTION, SOLUTION INTRAVENOUS ONCE
Status: COMPLETED | OUTPATIENT
Start: 2024-01-13 | End: 2024-01-13

## 2024-01-13 RX ADMIN — RIFAXIMIN 550 MG: 550 TABLET ORAL at 21:37

## 2024-01-13 RX ADMIN — SODIUM CHLORIDE 500 ML: 0.9 INJECTION, SOLUTION INTRAVENOUS at 03:02

## 2024-01-13 RX ADMIN — POTASSIUM CHLORIDE 40 MEQ: 1500 TABLET, EXTENDED RELEASE ORAL at 08:11

## 2024-01-13 RX ADMIN — Medication 1 TABLET: at 08:11

## 2024-01-13 RX ADMIN — CHLORHEXIDINE GLUCONATE 0.12% ORAL RINSE 15 ML: 1.2 LIQUID ORAL at 21:37

## 2024-01-13 RX ADMIN — SODIUM CHLORIDE 500 ML: 0.9 INJECTION, SOLUTION INTRAVENOUS at 08:24

## 2024-01-13 RX ADMIN — PANTOPRAZOLE SODIUM 40 MG: 40 TABLET, DELAYED RELEASE ORAL at 06:29

## 2024-01-13 RX ADMIN — MAGNESIUM SULFATE HEPTAHYDRATE 2 G: 40 INJECTION, SOLUTION INTRAVENOUS at 12:04

## 2024-01-13 RX ADMIN — FOLIC ACID 1 MG: 1 TABLET ORAL at 08:12

## 2024-01-13 RX ADMIN — ESCITALOPRAM OXALATE 10 MG: 10 TABLET ORAL at 08:13

## 2024-01-13 RX ADMIN — RIFAXIMIN 550 MG: 550 TABLET ORAL at 08:23

## 2024-01-13 RX ADMIN — LORAZEPAM 0.5 MG: 0.5 TABLET ORAL at 12:21

## 2024-01-13 RX ADMIN — THIAMINE HCL TAB 100 MG 100 MG: 100 TAB at 08:12

## 2024-01-13 RX ADMIN — POTASSIUM CHLORIDE 40 MEQ: 1500 TABLET, EXTENDED RELEASE ORAL at 17:50

## 2024-01-13 NOTE — ASSESSMENT & PLAN NOTE
Long history of anxiety.  Was on Paxil and Ativan long-term.  Removed off Paxil due to pregnancy, then suffered with postpartum depression was resumed.  Anxiety was not well-controlled and patient reports she turned to drinking to relax and sleep better at night  Started on Lexapro 10 mg daily, patient agreeable  Ativan as needed  Supportive care bedside  Psychiatry evaluated appreciate recommendations

## 2024-01-13 NOTE — QUICK NOTE
Hx hypotension: Status post PRBC, IVF and IV albumin    Reports dizziness and lightheadedness.  BP 80/42.    Previous BP 87/54  Will give IV NS bolus and reassess  Not maintained on antihypertensive

## 2024-01-13 NOTE — PLAN OF CARE
Problem: Prexisting or High Potential for Compromised Skin Integrity  Goal: Skin integrity is maintained or improved  Description: INTERVENTIONS:  - Identify patients at risk for skin breakdown  - Assess and monitor skin integrity  - Assess and monitor nutrition and hydration status  - Monitor labs   - Assess for incontinence   - Turn and reposition patient  - Assist with mobility/ambulation  - Relieve pressure over bony prominences  - Avoid friction and shearing  - Provide appropriate hygiene as needed including keeping skin clean and dry  - Evaluate need for skin moisturizer/barrier cream  - Collaborate with interdisciplinary team   - Patient/family teaching  - Consider wound care consult   Outcome: Progressing     Problem: PAIN - ADULT  Goal: Verbalizes/displays adequate comfort level or baseline comfort level  Description: Interventions:  - Encourage patient to monitor pain and request assistance  - Assess pain using appropriate pain scale  - Administer analgesics based on type and severity of pain and evaluate response  - Implement non-pharmacological measures as appropriate and evaluate response  - Consider cultural and social influences on pain and pain management  - Notify physician/advanced practitioner if interventions unsuccessful or patient reports new pain  Outcome: Progressing     Problem: INFECTION - ADULT  Goal: Absence or prevention of progression during hospitalization  Description: INTERVENTIONS:  - Assess and monitor for signs and symptoms of infection  - Monitor lab/diagnostic results  - Monitor all insertion sites, i.e. indwelling lines, tubes, and drains  - Monitor endotracheal if appropriate and nasal secretions for changes in amount and color  - Swain appropriate cooling/warming therapies per order  - Administer medications as ordered  - Instruct and encourage patient and family to use good hand hygiene technique  - Identify and instruct in appropriate isolation precautions for  identified infection/condition  Outcome: Progressing  Goal: Absence of fever/infection during neutropenic period  Description: INTERVENTIONS:  - Monitor WBC    Outcome: Progressing     Problem: SAFETY ADULT  Goal: Patient will remain free of falls  Description: INTERVENTIONS:  - Educate patient/family on patient safety including physical limitations  - Instruct patient to call for assistance with activity   - Consult OT/PT to assist with strengthening/mobility   - Keep Call bell within reach  - Keep bed low and locked with side rails adjusted as appropriate  - Keep care items and personal belongings within reach  - Initiate and maintain comfort rounds  - Make Fall Risk Sign visible to staff  - Offer Toileting every 2 Hours, in advance of need  - Initiate/Maintain bed alarm  - Apply yellow socks and bracelet for high fall risk patients  - Consider moving patient to room near nurses station  Outcome: Progressing  Goal: Maintain or return to baseline ADL function  Description: INTERVENTIONS:  -  Assess patient's ability to carry out ADLs; assess patient's baseline for ADL function and identify physical deficits which impact ability to perform ADLs (bathing, care of mouth/teeth, toileting, grooming, dressing, etc.)  - Assess/evaluate cause of self-care deficits   - Assess range of motion  - Assess patient's mobility; develop plan if impaired  - Assess patient's need for assistive devices and provide as appropriate  - Encourage maximum independence but intervene and supervise when necessary  - Involve family in performance of ADLs  - Assess for home care needs following discharge   - Consider OT consult to assist with ADL evaluation and planning for discharge  - Provide patient education as appropriate  Outcome: Progressing  Goal: Maintains/Returns to pre admission functional level  Description: INTERVENTIONS:  - Perform AM-PAC 6 Click Basic Mobility/ Daily Activity assessment daily.  - Set and communicate daily mobility  goal to care team and patient/family/caregiver.   - Collaborate with rehabilitation services on mobility goals if consulted  - Out of bed for toileting  - Record patient progress and toleration of activity level   Outcome: Progressing     Problem: DISCHARGE PLANNING  Goal: Discharge to home or other facility with appropriate resources  Description: INTERVENTIONS:  - Identify barriers to discharge w/patient and caregiver  - Arrange for needed discharge resources and transportation as appropriate  - Identify discharge learning needs (meds, wound care, etc.)  - Arrange for interpretive services to assist at discharge as needed  - Refer to Case Management Department for coordinating discharge planning if the patient needs post-hospital services based on physician/advanced practitioner order or complex needs related to functional status, cognitive ability, or social support system  Outcome: Progressing     Problem: Knowledge Deficit  Goal: Patient/family/caregiver demonstrates understanding of disease process, treatment plan, medications, and discharge instructions  Description: Complete learning assessment and assess knowledge base.  Interventions:  - Provide teaching at level of understanding  - Provide teaching via preferred learning methods  Outcome: Progressing     Problem: Nutrition/Hydration-ADULT  Goal: Nutrient/Hydration intake appropriate for improving, restoring or maintaining nutritional needs  Description: Monitor and assess patient's nutrition/hydration status for malnutrition. Collaborate with interdisciplinary team and initiate plan and interventions as ordered.  Monitor patient's weight and dietary intake as ordered or per policy. Utilize nutrition screening tool and intervene as necessary. Determine patient's food preferences and provide high-protein, high-caloric foods as appropriate.     INTERVENTIONS:  - Monitor oral intake, urinary output, labs, and treatment plans  - Assess nutrition and hydration  status and recommend course of action  - Evaluate amount of meals eaten  - Assist patient with eating if necessary   - Allow adequate time for meals  - Recommend/ encourage appropriate diets, oral nutritional supplements, and vitamin/mineral supplements  - Order, calculate, and assess calorie counts as needed  - Recommend, monitor, and adjust tube feedings and TPN/PPN based on assessed needs  - Assess need for intravenous fluids  - Provide specific nutrition/hydration education as appropriate  - Include patient/family/caregiver in decisions related to nutrition  Outcome: Progressing     Problem: NEUROSENSORY - ADULT  Goal: Achieves stable or improved neurological status  Description: INTERVENTIONS  - Monitor and report changes in neurological status  - Monitor vital signs such as temperature, blood pressure, glucose, and any other labs ordered   - Initiate measures to prevent increased intracranial pressure  - Monitor for seizure activity and implement precautions if appropriate      Outcome: Progressing  Goal: Achieves maximal functionality and self care  Description: INTERVENTIONS  - Monitor swallowing and airway patency with patient fatigue and changes in neurological status  - Encourage and assist patient to increase activity and self care.   - Encourage visually impaired, hearing impaired and aphasic patients to use assistive/communication devices  Outcome: Progressing     Problem: GASTROINTESTINAL - ADULT  Goal: Minimal or absence of nausea and/or vomiting  Description: INTERVENTIONS:  - Administer IV fluids if ordered to ensure adequate hydration  - Maintain NPO status until nausea and vomiting are resolved  - Nasogastric tube if ordered  - Administer ordered antiemetic medications as needed  - Provide nonpharmacologic comfort measures as appropriate  - Advance diet as tolerated, if ordered  - Consider nutrition services referral to assist patient with adequate nutrition and appropriate food choices  Outcome:  Progressing  Goal: Maintains adequate nutritional intake  Description: INTERVENTIONS:  - Monitor percentage of each meal consumed  - Identify factors contributing to decreased intake, treat as appropriate  - Assist with meals as needed  - Monitor I&O, weight, and lab values if indicated  - Obtain nutrition services referral as needed  Outcome: Progressing  Goal: Oral mucous membranes remain intact  Description: INTERVENTIONS  - Assess oral mucosa and hygiene practices  - Implement preventative oral hygiene regimen  - Implement oral medicated treatments as ordered  - Initiate Nutrition services referral as needed  Outcome: Progressing     Problem: GENITOURINARY - ADULT  Goal: Absence of urinary retention  Description: INTERVENTIONS:  - Assess patient’s ability to void and empty bladder  - Monitor I/O  - Bladder scan as needed  - Discuss with physician/AP medications to alleviate retention as needed  - Discuss catheterization for long term situations as appropriate  Outcome: Progressing     Problem: METABOLIC, FLUID AND ELECTROLYTES - ADULT  Goal: Electrolytes maintained within normal limits  Description: INTERVENTIONS:  - Monitor labs and assess patient for signs and symptoms of electrolyte imbalances  - Administer electrolyte replacement as ordered  - Monitor response to electrolyte replacements, including repeat lab results as appropriate  - Instruct patient on fluid and nutrition as appropriate  Outcome: Progressing  Goal: Fluid balance maintained  Description: INTERVENTIONS:  - Monitor labs   - Monitor I/O and WT  - Instruct patient on fluid and nutrition as appropriate  - Assess for signs & symptoms of volume excess or deficit  Outcome: Progressing     Problem: SKIN/TISSUE INTEGRITY - ADULT  Goal: Skin Integrity remains intact(Skin Breakdown Prevention)  -Assess extremities for adequate circulation and sensation     Bed Management:  -Have minimal linens on bed & keep smooth, unwrinkled  -Change linens as needed  when moist or perspiring    Toileting:  -Offer bedside commode    Activity:  -Encourage activity and walks on unit  -Encourage or provide ROM exercises   -Use appropriate equipment to lift or move patient in bed    Skin Care:  -Avoid use of baby powder, tape, friction and shearing, hot water or constrictive clothing  -Do not massage red bony areas       Problem: MUSCULOSKELETAL - ADULT  Goal: Maintain or return mobility to safest level of function  Description: INTERVENTIONS:  - Assess patient's ability to carry out ADLs; assess patient's baseline for ADL function and identify physical deficits which impact ability to perform ADLs (bathing, care of mouth/teeth, toileting, grooming, dressing, etc.)  - Assess/evaluate cause of self-care deficits   - Assess range of motion  - Assess patient's mobility  - Assess patient's need for assistive devices and provide as appropriate  - Encourage maximum independence but intervene and supervise when necessary  - Involve family in performance of ADLs  - Assess for home care needs following discharge   - Consider OT consult to assist with ADL evaluation and planning for discharge  - Provide patient education as appropriate  Outcome: Progressing

## 2024-01-13 NOTE — ASSESSMENT & PLAN NOTE
Results from last 7 days   Lab Units 01/13/24  0810 01/12/24  1343 01/12/24  0528 01/11/24  1321 01/11/24  0855   POTASSIUM mmol/L 3.3*  --  3.0* 3.4* 3.1*   MAGNESIUM mg/dL 1.3* 1.9 1.2* 1.5* 6.6*     Treating for hypomagnesemia and hypokalemia  Potassium continues to be low likely in setting of GI loss and poor oral intake, continue 40 mEq twice daily   Replete Mag IV, recheck levels in afternoon  Monitor intake and output
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
## 2024-01-13 NOTE — ASSESSMENT & PLAN NOTE
Patient was transferred for [facility] detox unit to [facility] ICU on 1/5 for hematemesis  Pt was evaluated by GI team and initially placed on octreotide infusion  CT scan with evidence of varices and portal HTN  Received IV Protonix, and Rocephin for SBP prophylaxis (completed day 7 today)  Octreotide drip discontinued  Had associated ABLA and hypotension and was transfused 1u PRBC on 1/8  Underwent EGD 1/8/2024-normal esophagus, no esophageal or gastric varices, 3 cm hiatal hernia, likely Bill's esophagus, mild portal hypertensive gastropathy and body of stomach, first part of duodenum and second part of duodenum appearing normal.  No old or fresh blood.  Return to floor, resume diet, continue oral PPI daily  No further signs or evidence of bleeding.  Hemoglobin stable 9.3

## 2024-01-13 NOTE — PLAN OF CARE
Problem: OCCUPATIONAL THERAPY ADULT  Goal: Performs self-care activities at highest level of function for planned discharge setting.  See evaluation for individualized goals.  Description: Treatment Interventions: ADL retraining, Functional transfer training, UE strengthening/ROM, Endurance training, Continued evaluation, Energy conservation          See flowsheet documentation for full assessment, interventions and recommendations.   Outcome: Progressing  Note: Limitation: Decreased ADL status, Decreased UE strength, Decreased Safe judgement during ADL, Decreased endurance, Decreased high-level ADLs, Decreased self-care trans  Prognosis: Fair  Assessment: Pt seen for OT f/u tx session this date. Improvement noted w/ mentation although continues w/ some lethargy - she continues w/ some hypotension. 94/55 while supine, 90/51 w/ standing. Denies lightheadedness/dizziness. She is A&Ox4, following one step commands w/ 100% accuracy although somewhat delayed w/ mult-step commands requiring repitition. Bed mobility completed independently, transfers and functional mobility w/ SBA. Toileting completed w/ SBA. Given pt's improvement and available support at home (per pt report), pt will require level III resource intensity at time of d/c. Will decrease frequency to 1-3x/week for POC.     Rehab Resource Intensity Level, OT: III (Minimum Resource Intensity)

## 2024-01-13 NOTE — ASSESSMENT & PLAN NOTE
Pt presented to [facility] Detox unit with acute alcoholic hepatitis  Initial  and T bili 4.7  Pt was tx with supportive measures:  discriminant function <32 and steroids not indicated  Repeated discriminant function:  5--> steroids again not indicated  AST improved to 81, T. bili 3.16  Trend HFP

## 2024-01-13 NOTE — ASSESSMENT & PLAN NOTE
Pt has h/o chronic anemia, likely due to anemia of chronic disease with baseline Hb appros 10-11  Pt developed ABLA in the setting of hematemesis and Hb dropped to 7.2  Was transfused 1u PRBC on 1/8 with improvement in Hgb  GI evaluating UGI bleed:  Underwent EGD without evidence of bleeding  Current hemoglobin stable at 9.3  No signs or symptoms of bleeding per patient  Iron panel elevated ferritin displaying anemia chronic disease

## 2024-01-13 NOTE — ASSESSMENT & PLAN NOTE
Patient was significantly encephalopathic, lethargic with asterixis on exam, now resolved alert and oriented x 3  Received thiamine protocol for concern for Warnicke's  Ammonia level noted to be 178 on admission  Was started on lactulose and Xifaxan  Lowering lactulose dosage  Monitor mentation while decreasing lactulose titrate to 2-3 bowel movements
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
## 2024-01-13 NOTE — ASSESSMENT & PLAN NOTE
Malnutrition Findings:   Adult Malnutrition type: Chronic illness  Adult Degree of Malnutrition: Other severe protein calorie malnutrition  Malnutrition Characteristics: Muscle loss, Fat loss  360 Statement: severe protein calorie malnutrition related to inadequate protein intake and lack of nutrient-dense foods with excessive ETOH intake as evidenced by  consumption of 8-10 beers daily and hard liquor although she can not quantify how much liquor; severe muscle wasting(temporalis, pectoralis, fat loss (orbital fat pads, triceps), treated with TBD  BMI Findings:  Adult BMI Classifications: Underweight < 18.5  Body mass index is 17.94 kg/m².

## 2024-01-13 NOTE — ASSESSMENT & PLAN NOTE
Likely combination of poor oral intake, low solute intake, excessive free water due to beer potomania and GI losses with lactulose, reports 6 loose bowel movements 1/11   Goal BM 2-3 times a day  decreased lactulose to 10 g from 20  Monitor CMP
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
## 2024-01-14 LAB
ALBUMIN SERPL BCP-MCNC: 3.3 G/DL (ref 3.5–5)
ALP SERPL-CCNC: 115 U/L (ref 34–104)
ALT SERPL W P-5'-P-CCNC: 18 U/L (ref 7–52)
ANION GAP SERPL CALCULATED.3IONS-SCNC: 5 MMOL/L
AST SERPL W P-5'-P-CCNC: 71 U/L (ref 13–39)
ATRIAL RATE: 62 BPM
ATRIAL RATE: 64 BPM
BASOPHILS # BLD AUTO: 0.13 THOUSANDS/ÂΜL (ref 0–0.1)
BASOPHILS NFR BLD AUTO: 1 % (ref 0–1)
BILIRUB SERPL-MCNC: 2.61 MG/DL (ref 0.2–1)
BUN SERPL-MCNC: 4 MG/DL (ref 5–25)
CALCIUM ALBUM COR SERPL-MCNC: 8.9 MG/DL (ref 8.3–10.1)
CALCIUM SERPL-MCNC: 8.3 MG/DL (ref 8.4–10.2)
CHLORIDE SERPL-SCNC: 108 MMOL/L (ref 96–108)
CO2 SERPL-SCNC: 19 MMOL/L (ref 21–32)
CREAT SERPL-MCNC: 0.35 MG/DL (ref 0.6–1.3)
EOSINOPHIL # BLD AUTO: 0.08 THOUSAND/ÂΜL (ref 0–0.61)
EOSINOPHIL NFR BLD AUTO: 1 % (ref 0–6)
ERYTHROCYTE [DISTWIDTH] IN BLOOD BY AUTOMATED COUNT: 15.4 % (ref 11.6–15.1)
GFR SERPL CREATININE-BSD FRML MDRD: 130 ML/MIN/1.73SQ M
GLUCOSE SERPL-MCNC: 100 MG/DL (ref 65–140)
GLUCOSE SERPL-MCNC: 121 MG/DL (ref 65–140)
GLUCOSE SERPL-MCNC: 142 MG/DL (ref 65–140)
GLUCOSE SERPL-MCNC: 87 MG/DL (ref 65–140)
HCT VFR BLD AUTO: 25.4 % (ref 34.8–46.1)
HGB BLD-MCNC: 8.7 G/DL (ref 11.5–15.4)
IMM GRANULOCYTES # BLD AUTO: 0.04 THOUSAND/UL (ref 0–0.2)
IMM GRANULOCYTES NFR BLD AUTO: 0 % (ref 0–2)
LYMPHOCYTES # BLD AUTO: 2.41 THOUSANDS/ÂΜL (ref 0.6–4.47)
LYMPHOCYTES NFR BLD AUTO: 26 % (ref 14–44)
MAGNESIUM SERPL-MCNC: 1.4 MG/DL (ref 1.9–2.7)
MCH RBC QN AUTO: 32 PG (ref 26.8–34.3)
MCHC RBC AUTO-ENTMCNC: 34.3 G/DL (ref 31.4–37.4)
MCV RBC AUTO: 93 FL (ref 82–98)
MONOCYTES # BLD AUTO: 0.94 THOUSAND/ÂΜL (ref 0.17–1.22)
MONOCYTES NFR BLD AUTO: 10 % (ref 4–12)
NEUTROPHILS # BLD AUTO: 5.82 THOUSANDS/ÂΜL (ref 1.85–7.62)
NEUTS SEG NFR BLD AUTO: 62 % (ref 43–75)
NRBC BLD AUTO-RTO: 0 /100 WBCS
P AXIS: 56 DEGREES
P AXIS: 60 DEGREES
PLATELET # BLD AUTO: 255 THOUSANDS/UL (ref 149–390)
PMV BLD AUTO: 10.7 FL (ref 8.9–12.7)
POTASSIUM SERPL-SCNC: 3.7 MMOL/L (ref 3.5–5.3)
PR INTERVAL: 152 MS
PR INTERVAL: 154 MS
PROT SERPL-MCNC: 5.9 G/DL (ref 6.4–8.4)
QRS AXIS: 60 DEGREES
QRS AXIS: 60 DEGREES
QRSD INTERVAL: 78 MS
QRSD INTERVAL: 80 MS
QT INTERVAL: 404 MS
QT INTERVAL: 406 MS
QTC INTERVAL: 412 MS
QTC INTERVAL: 416 MS
RBC # BLD AUTO: 2.72 MILLION/UL (ref 3.81–5.12)
SODIUM SERPL-SCNC: 132 MMOL/L (ref 135–147)
T WAVE AXIS: 25 DEGREES
T WAVE AXIS: 30 DEGREES
VENTRICULAR RATE: 62 BPM
VENTRICULAR RATE: 64 BPM
WBC # BLD AUTO: 9.42 THOUSAND/UL (ref 4.31–10.16)

## 2024-01-14 PROCEDURE — 82948 REAGENT STRIP/BLOOD GLUCOSE: CPT

## 2024-01-14 PROCEDURE — 99232 SBSQ HOSP IP/OBS MODERATE 35: CPT

## 2024-01-14 PROCEDURE — 85025 COMPLETE CBC W/AUTO DIFF WBC: CPT

## 2024-01-14 PROCEDURE — 93005 ELECTROCARDIOGRAM TRACING: CPT

## 2024-01-14 PROCEDURE — 83735 ASSAY OF MAGNESIUM: CPT

## 2024-01-14 PROCEDURE — 80053 COMPREHEN METABOLIC PANEL: CPT

## 2024-01-14 RX ORDER — LACTULOSE 10 G/15ML
10 SOLUTION ORAL DAILY PRN
Status: DISCONTINUED | OUTPATIENT
Start: 2024-01-14 | End: 2024-01-20 | Stop reason: HOSPADM

## 2024-01-14 RX ORDER — MAGNESIUM SULFATE HEPTAHYDRATE 40 MG/ML
4 INJECTION, SOLUTION INTRAVENOUS ONCE
Qty: 100 ML | Refills: 0 | Status: COMPLETED | OUTPATIENT
Start: 2024-01-14 | End: 2024-01-14

## 2024-01-14 RX ADMIN — POTASSIUM CHLORIDE 40 MEQ: 1500 TABLET, EXTENDED RELEASE ORAL at 17:45

## 2024-01-14 RX ADMIN — MAGNESIUM SULFATE HEPTAHYDRATE 4 G: 40 INJECTION, SOLUTION INTRAVENOUS at 11:37

## 2024-01-14 RX ADMIN — Medication 1 TABLET: at 08:30

## 2024-01-14 RX ADMIN — RIFAXIMIN 550 MG: 550 TABLET ORAL at 08:35

## 2024-01-14 RX ADMIN — ESCITALOPRAM OXALATE 10 MG: 10 TABLET ORAL at 08:30

## 2024-01-14 RX ADMIN — THIAMINE HCL TAB 100 MG 100 MG: 100 TAB at 08:30

## 2024-01-14 RX ADMIN — PANTOPRAZOLE SODIUM 40 MG: 40 TABLET, DELAYED RELEASE ORAL at 06:48

## 2024-01-14 RX ADMIN — FOLIC ACID 1 MG: 1 TABLET ORAL at 08:30

## 2024-01-14 RX ADMIN — POTASSIUM CHLORIDE 40 MEQ: 1500 TABLET, EXTENDED RELEASE ORAL at 08:30

## 2024-01-14 RX ADMIN — LORAZEPAM 0.5 MG: 0.5 TABLET ORAL at 17:48

## 2024-01-14 NOTE — ASSESSMENT & PLAN NOTE
Patient was initially admitted to [facility] on 1/4/24 and was treated with [facility] protocol with phenobarbital  She was transferred to [facility] ICU 1/6/24 for evaluation of GI bleed   She was started on serax but it was further discontinued 1/7 due to somnolence  S/p CIWA protocol  Pt received iv thiamine 500mg IV q8h x 2 days, then 250mg IV daily x 5 days  Now on oral thiamine and folic acid  Patient reported she has done drinking.  Most of her drinking stems from anxiety.  Started on Lexapro here.  Continue to encourage HOST

## 2024-01-14 NOTE — ASSESSMENT & PLAN NOTE
Pt presented to [facility] Detox unit with acute alcoholic hepatitis  Initial  and T bili 4.7  Pt was tx with supportive measures:  discriminant function <32 and steroids not indicated  Repeated discriminant function:  5--> steroids again not indicated  AST improved to 81, T. bili 3.16  Trend HFP
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
## 2024-01-14 NOTE — ASSESSMENT & PLAN NOTE
Pt has h/o chronic anemia, likely due to anemia of chronic disease with baseline Hb appros 10-11  Pt developed ABLA in the setting of hematemesis and Hb dropped to 7.2  Was transfused 1u PRBC on 1/8 with improvement in Hgb  GI evaluating UGI bleed:  Underwent EGD without evidence of bleeding  Current hemoglobin stable at 8.7  No signs or symptoms of bleeding per patient  Iron panel elevated ferritin displaying anemia chronic disease

## 2024-01-14 NOTE — ASSESSMENT & PLAN NOTE
Patient was significantly encephalopathic, lethargic with asterixis on exam, now resolved alert and oriented x 3  Received thiamine protocol for concern for Warnicke's  Ammonia level noted to be 178 on admission  Was started on lactulose and Xifaxan, patient has been refusing laculose has been having 10+ bowel movements with each dose will decrease to as needed daily to achieve 2-3 bowel movents, send rifaximin for price check to confirm affordability   Monitor mentation

## 2024-01-14 NOTE — PLAN OF CARE
Problem: NEUROSENSORY - ADULT  Goal: Achieves stable or improved neurological status  Description: INTERVENTIONS  - Monitor and report changes in neurological status  - Monitor vital signs such as temperature, blood pressure, glucose, and any other labs ordered   - Initiate measures to prevent increased intracranial pressure  - Monitor for seizure activity and implement precautions if appropriate      Outcome: Progressing     Problem: SAFETY ADULT  Goal: Patient will remain free of falls  Description: INTERVENTIONS:  - Educate patient/family on patient safety including physical limitations  - Instruct patient to call for assistance with activity   - Consult OT/PT to assist with strengthening/mobility   - Keep Call bell within reach  - Keep bed low and locked with side rails adjusted as appropriate  - Keep care items and personal belongings within reach  - Initiate and maintain comfort rounds  - Make Fall Risk Sign visible to staff  - Offer Toileting every 2 Hours, in advance of need  - Initiate/Maintain bed alarm  - Apply yellow socks and bracelet for high fall risk patients  - Consider moving patient to room near nurses station  Outcome: Progressing     Problem: Nutrition/Hydration-ADULT  Goal: Nutrient/Hydration intake appropriate for improving, restoring or maintaining nutritional needs  Description: Monitor and assess patient's nutrition/hydration status for malnutrition. Collaborate with interdisciplinary team and initiate plan and interventions as ordered.  Monitor patient's weight and dietary intake as ordered or per policy. Utilize nutrition screening tool and intervene as necessary. Determine patient's food preferences and provide high-protein, high-caloric foods as appropriate.     INTERVENTIONS:  - Monitor oral intake, urinary output, labs, and treatment plans  - Assess nutrition and hydration status and recommend course of action  - Evaluate amount of meals eaten  - Assist patient with eating if  necessary   - Allow adequate time for meals  - Recommend/ encourage appropriate diets, oral nutritional supplements, and vitamin/mineral supplements  - Order, calculate, and assess calorie counts as needed  - Recommend, monitor, and adjust tube feedings and TPN/PPN based on assessed needs  - Assess need for intravenous fluids  - Provide specific nutrition/hydration education as appropriate  - Include patient/family/caregiver in decisions related to nutrition  Outcome: Progressing     Problem: Nutrition/Hydration-ADULT  Goal: Nutrient/Hydration intake appropriate for improving, restoring or maintaining nutritional needs  Description: Monitor and assess patient's nutrition/hydration status for malnutrition. Collaborate with interdisciplinary team and initiate plan and interventions as ordered.  Monitor patient's weight and dietary intake as ordered or per policy. Utilize nutrition screening tool and intervene as necessary. Determine patient's food preferences and provide high-protein, high-caloric foods as appropriate.     INTERVENTIONS:  - Monitor oral intake, urinary output, labs, and treatment plans  - Assess nutrition and hydration status and recommend course of action  - Evaluate amount of meals eaten  - Assist patient with eating if necessary   - Allow adequate time for meals  - Recommend/ encourage appropriate diets, oral nutritional supplements, and vitamin/mineral supplements  - Order, calculate, and assess calorie counts as needed  - Recommend, monitor, and adjust tube feedings and TPN/PPN based on assessed needs  - Assess need for intravenous fluids  - Provide specific nutrition/hydration education as appropriate  - Include patient/family/caregiver in decisions related to nutrition  Outcome: Progressing

## 2024-01-14 NOTE — PLAN OF CARE
Problem: Prexisting or High Potential for Compromised Skin Integrity  Goal: Skin integrity is maintained or improved  Description: INTERVENTIONS:  - Identify patients at risk for skin breakdown  - Assess and monitor skin integrity  - Assess and monitor nutrition and hydration status  - Monitor labs   - Assess for incontinence   - Turn and reposition patient  - Assist with mobility/ambulation  - Relieve pressure over bony prominences  - Avoid friction and shearing  - Provide appropriate hygiene as needed including keeping skin clean and dry  - Evaluate need for skin moisturizer/barrier cream  - Collaborate with interdisciplinary team   - Patient/family teaching  - Consider wound care consult   Outcome: Progressing     Problem: PAIN - ADULT  Goal: Verbalizes/displays adequate comfort level or baseline comfort level  Description: Interventions:  - Encourage patient to monitor pain and request assistance  - Assess pain using appropriate pain scale  - Administer analgesics based on type and severity of pain and evaluate response  - Implement non-pharmacological measures as appropriate and evaluate response  - Consider cultural and social influences on pain and pain management  - Notify physician/advanced practitioner if interventions unsuccessful or patient reports new pain  Outcome: Progressing     Problem: INFECTION - ADULT  Goal: Absence or prevention of progression during hospitalization  Description: INTERVENTIONS:  - Assess and monitor for signs and symptoms of infection  - Monitor lab/diagnostic results  - Monitor all insertion sites, i.e. indwelling lines, tubes, and drains  - Monitor endotracheal if appropriate and nasal secretions for changes in amount and color  - [place] appropriate cooling/warming therapies per order  - Administer medications as ordered  - Instruct and encourage patient and family to use good hand hygiene technique  - Identify and instruct in appropriate isolation precautions for  identified infection/condition  Outcome: Progressing  Goal: Absence of fever/infection during neutropenic period  Description: INTERVENTIONS:  - Monitor WBC    Outcome: Progressing     Problem: SAFETY ADULT  Goal: Patient will remain free of falls  Description: INTERVENTIONS:  - Educate patient/family on patient safety including physical limitations  - Instruct patient to call for assistance with activity   - Consult OT/PT to assist with strengthening/mobility   - Keep Call bell within reach  - Keep bed low and locked with side rails adjusted as appropriate  - Keep care items and personal belongings within reach  - Initiate and maintain comfort rounds  - Make Fall Risk Sign visible to staff  - Offer Toileting every 2 Hours, in advance of need  - Initiate/Maintain bed alarm  - Apply yellow socks and bracelet for high fall risk patients  - Consider moving patient to room near nurses station  Outcome: Progressing  Goal: Maintain or return to baseline ADL function  Description: INTERVENTIONS:  -  Assess patient's ability to carry out ADLs; assess patient's baseline for ADL function and identify physical deficits which impact ability to perform ADLs (bathing, care of mouth/teeth, toileting, grooming, dressing, etc.)  - Assess/evaluate cause of self-care deficits   - Assess range of motion  - Assess patient's mobility; develop plan if impaired  - Assess patient's need for assistive devices and provide as appropriate  - Encourage maximum independence but intervene and supervise when necessary  - Involve family in performance of ADLs  - Assess for home care needs following discharge   - Consider OT consult to assist with ADL evaluation and planning for discharge  - Provide patient education as appropriate  Outcome: Progressing  Goal: Maintains/Returns to pre admission functional level  Description: INTERVENTIONS:  - Perform AM-PAC 6 Click Basic Mobility/ Daily Activity assessment daily.  - Set and communicate daily mobility  goal to care team and patient/family/caregiver.   - Collaborate with rehabilitation services on mobility goals if consulted  - Out of bed for toileting  - Record patient progress and toleration of activity level   Outcome: Progressing     Problem: DISCHARGE PLANNING  Goal: Discharge to home or other facility with appropriate resources  Description: INTERVENTIONS:  - Identify barriers to discharge w/patient and caregiver  - Arrange for needed discharge resources and transportation as appropriate  - Identify discharge learning needs (meds, wound care, etc.)  - Arrange for interpretive services to assist at discharge as needed  - Refer to Case Management Department for coordinating discharge planning if the patient needs post-hospital services based on physician/advanced practitioner order or complex needs related to functional status, cognitive ability, or social support system  Outcome: Progressing     Problem: Knowledge Deficit  Goal: Patient/family/caregiver demonstrates understanding of disease process, treatment plan, medications, and discharge instructions  Description: Complete learning assessment and assess knowledge base.  Interventions:  - Provide teaching at level of understanding  - Provide teaching via preferred learning methods  Outcome: Progressing     Problem: Nutrition/Hydration-ADULT  Goal: Nutrient/Hydration intake appropriate for improving, restoring or maintaining nutritional needs  Description: Monitor and assess patient's nutrition/hydration status for malnutrition. Collaborate with interdisciplinary team and initiate plan and interventions as ordered.  Monitor patient's weight and dietary intake as ordered or per policy. Utilize nutrition screening tool and intervene as necessary. Determine patient's food preferences and provide high-protein, high-caloric foods as appropriate.     INTERVENTIONS:  - Monitor oral intake, urinary output, labs, and treatment plans  - Assess nutrition and hydration  status and recommend course of action  - Evaluate amount of meals eaten  - Assist patient with eating if necessary   - Allow adequate time for meals  - Recommend/ encourage appropriate diets, oral nutritional supplements, and vitamin/mineral supplements  - Order, calculate, and assess calorie counts as needed  - Recommend, monitor, and adjust tube feedings and TPN/PPN based on assessed needs  - Assess need for intravenous fluids  - Provide specific nutrition/hydration education as appropriate  - Include patient/family/caregiver in decisions related to nutrition  Outcome: Progressing     Problem: NEUROSENSORY - ADULT  Goal: Achieves stable or improved neurological status  Description: INTERVENTIONS  - Monitor and report changes in neurological status  - Monitor vital signs such as temperature, blood pressure, glucose, and any other labs ordered   - Initiate measures to prevent increased intracranial pressure  - Monitor for seizure activity and implement precautions if appropriate      Outcome: Progressing  Goal: Achieves maximal functionality and self care  Description: INTERVENTIONS  - Monitor swallowing and airway patency with patient fatigue and changes in neurological status  - Encourage and assist patient to increase activity and self care.   - Encourage visually impaired, hearing impaired and aphasic patients to use assistive/communication devices  Outcome: Progressing     Problem: GASTROINTESTINAL - ADULT  Goal: Minimal or absence of nausea and/or vomiting  Description: INTERVENTIONS:  - Administer IV fluids if ordered to ensure adequate hydration  - Maintain NPO status until nausea and vomiting are resolved  - Nasogastric tube if ordered  - Administer ordered antiemetic medications as needed  - Provide nonpharmacologic comfort measures as appropriate  - Advance diet as tolerated, if ordered  - Consider nutrition services referral to assist patient with adequate nutrition and appropriate food choices  Outcome:  Progressing  Goal: Maintains adequate nutritional intake  Description: INTERVENTIONS:  - Monitor percentage of each meal consumed  - Identify factors contributing to decreased intake, treat as appropriate  - Assist with meals as needed  - Monitor I&O, weight, and lab values if indicated  - Obtain nutrition services referral as needed  Outcome: Progressing  Goal: Oral mucous membranes remain intact  Description: INTERVENTIONS  - Assess oral mucosa and hygiene practices  - Implement preventative oral hygiene regimen  - Implement oral medicated treatments as ordered  - Initiate Nutrition services referral as needed  Outcome: Progressing     Problem: GENITOURINARY - ADULT  Goal: Absence of urinary retention  Description: INTERVENTIONS:  - Assess patient’s ability to void and empty bladder  - Monitor I/O  - Bladder scan as needed  - Discuss with physician/AP medications to alleviate retention as needed  - Discuss catheterization for long term situations as appropriate  Outcome: Progressing     Problem: METABOLIC, FLUID AND ELECTROLYTES - ADULT  Goal: Electrolytes maintained within normal limits  Description: INTERVENTIONS:  - Monitor labs and assess patient for signs and symptoms of electrolyte imbalances  - Administer electrolyte replacement as ordered  - Monitor response to electrolyte replacements, including repeat lab results as appropriate  - Instruct patient on fluid and nutrition as appropriate  Outcome: Progressing  Goal: Fluid balance maintained  Description: INTERVENTIONS:  - Monitor labs   - Monitor I/O and WT  - Instruct patient on fluid and nutrition as appropriate  - Assess for signs & symptoms of volume excess or deficit  Outcome: Progressing     Problem: SKIN/TISSUE INTEGRITY - ADULT  Goal: Skin Integrity remains intact(Skin Breakdown Prevention)  Description: Assess:  -Perform Leon assessment every   -Clean and moisturize skin every   -Inspect skin when repositioning, toileting, and assisting with  ADLS  -Assess under medical devices such as  every   -Assess extremities for adequate circulation and sensation     Bed Management:  -Have minimal linens on bed & keep smooth, unwrinkled  -Change linens as needed when moist or perspiring  -Avoid sitting or lying in one position for more than  hours while in bed  -Keep HOB at degrees     Toileting:  -Offer bedside commode  -Assess for incontinence every   -Use incontinent care products after each incontinent episode such as     Activity:  -Mobilize patient  times a day  -Encourage activity and walks on unit  -Encourage or provide ROM exercises   -Turn and reposition patient every  Hours  -Use appropriate equipment to lift or move patient in bed  -Instruct/ Assist with weight shifting every  when out of bed in chair  -Consider limitation of chair time  hour intervals    Skin Care:  -Avoid use of baby powder, tape, friction and shearing, hot water or constrictive clothing  -Relieve pressure over bony prominences using   -Do not massage red bony areas    Next Steps:  -Teach patient strategies to minimize risks such as    -Consider consults to  interdisciplinary teams such as   Outcome: Progressing     Problem: MUSCULOSKELETAL - ADULT  Goal: Maintain or return mobility to safest level of function  Description: INTERVENTIONS:  - Assess patient's ability to carry out ADLs; assess patient's baseline for ADL function and identify physical deficits which impact ability to perform ADLs (bathing, care of mouth/teeth, toileting, grooming, dressing, etc.)  - Assess/evaluate cause of self-care deficits   - Assess range of motion  - Assess patient's mobility  - Assess patient's need for assistive devices and provide as appropriate  - Encourage maximum independence but intervene and supervise when necessary  - Involve family in performance of ADLs  - Assess for home care needs following discharge   - Consider OT consult to assist with ADL evaluation and planning for discharge  -  Provide patient education as appropriate  Outcome: Progressing

## 2024-01-14 NOTE — ASSESSMENT & PLAN NOTE
Results from last 7 days   Lab Units 01/14/24  0517 01/13/24  0810 01/12/24  1343 01/12/24  0528 01/11/24  1321   POTASSIUM mmol/L 3.7 3.3*  --  3.0* 3.4*   MAGNESIUM mg/dL 1.4* 1.3* 1.9 1.2* 1.5*     Treating for hypomagnesemia and hypokalemia  Potassium continues to be low likely in setting of GI loss and poor oral intake, continue 40 mEq twice daily   Monitor intake and output

## 2024-01-14 NOTE — ASSESSMENT & PLAN NOTE
Patient was transferred for [facility] detox unit to [facility] ICU on 1/5 for hematemesis  Pt was evaluated by GI team and initially placed on octreotide infusion  CT scan with evidence of varices and portal HTN  Received IV Protonix, and Rocephin for SBP prophylaxis (completed day 7 today)  Octreotide drip discontinued  Had associated ABLA and hypotension and was transfused 1u PRBC on 1/8  Underwent EGD 1/8/2024-normal esophagus, no esophageal or gastric varices, 3 cm hiatal hernia, likely Bill's esophagus, mild portal hypertensive gastropathy and body of stomach, first part of duodenum and second part of duodenum appearing normal.  No old or fresh blood.  Return to floor, resume diet, continue oral PPI daily  No further signs or evidence of bleeding.  Hemoglobin stable 8.7

## 2024-01-14 NOTE — ASSESSMENT & PLAN NOTE
Malnutrition Findings:   Adult Malnutrition type: Chronic illness  Adult Degree of Malnutrition: Other severe protein calorie malnutrition  Malnutrition Characteristics: Muscle loss, Fat loss  360 Statement: severe protein calorie malnutrition related to inadequate protein intake and lack of nutrient-dense foods with excessive ETOH intake as evidenced by  consumption of 8-10 beers daily and hard liquor although she can not quantify how much liquor; severe muscle wasting(temporalis, pectoralis, fat loss (orbital fat pads, triceps), treated with TBD  BMI Findings:  Adult BMI Classifications: Underweight < 18.5  Body mass index is 17.86 kg/m².

## 2024-01-14 NOTE — ASSESSMENT & PLAN NOTE
Status post PRBC, IVF and IV albumin  Bolused with IVF x 2 1/13 continue to monitor   Continue to monitor

## 2024-01-15 LAB
ANION GAP SERPL CALCULATED.3IONS-SCNC: 7 MMOL/L
ATRIAL RATE: 66 BPM
BASOPHILS # BLD AUTO: 0.14 THOUSANDS/ÂΜL (ref 0–0.1)
BASOPHILS NFR BLD AUTO: 1 % (ref 0–1)
BUN SERPL-MCNC: 4 MG/DL (ref 5–25)
CALCIUM SERPL-MCNC: 8.8 MG/DL (ref 8.4–10.2)
CHLORIDE SERPL-SCNC: 106 MMOL/L (ref 96–108)
CO2 SERPL-SCNC: 17 MMOL/L (ref 21–32)
CREAT SERPL-MCNC: 0.38 MG/DL (ref 0.6–1.3)
EOSINOPHIL # BLD AUTO: 0.08 THOUSAND/ÂΜL (ref 0–0.61)
EOSINOPHIL NFR BLD AUTO: 1 % (ref 0–6)
ERYTHROCYTE [DISTWIDTH] IN BLOOD BY AUTOMATED COUNT: 15.4 % (ref 11.6–15.1)
GFR SERPL CREATININE-BSD FRML MDRD: 126 ML/MIN/1.73SQ M
GLUCOSE SERPL-MCNC: 117 MG/DL (ref 65–140)
GLUCOSE SERPL-MCNC: 82 MG/DL (ref 65–140)
GLUCOSE SERPL-MCNC: 88 MG/DL (ref 65–140)
GLUCOSE SERPL-MCNC: 89 MG/DL (ref 65–140)
HCT VFR BLD AUTO: 26.9 % (ref 34.8–46.1)
HGB BLD-MCNC: 9.3 G/DL (ref 11.5–15.4)
IMM GRANULOCYTES # BLD AUTO: 0.07 THOUSAND/UL (ref 0–0.2)
IMM GRANULOCYTES NFR BLD AUTO: 1 % (ref 0–2)
LYMPHOCYTES # BLD AUTO: 2.33 THOUSANDS/ÂΜL (ref 0.6–4.47)
LYMPHOCYTES NFR BLD AUTO: 22 % (ref 14–44)
MAGNESIUM SERPL-MCNC: 1.4 MG/DL (ref 1.9–2.7)
MCH RBC QN AUTO: 32.5 PG (ref 26.8–34.3)
MCHC RBC AUTO-ENTMCNC: 34.6 G/DL (ref 31.4–37.4)
MCV RBC AUTO: 94 FL (ref 82–98)
MONOCYTES # BLD AUTO: 0.83 THOUSAND/ÂΜL (ref 0.17–1.22)
MONOCYTES NFR BLD AUTO: 8 % (ref 4–12)
NEUTROPHILS # BLD AUTO: 7.35 THOUSANDS/ÂΜL (ref 1.85–7.62)
NEUTS SEG NFR BLD AUTO: 67 % (ref 43–75)
NRBC BLD AUTO-RTO: 0 /100 WBCS
P AXIS: 54 DEGREES
PLATELET # BLD AUTO: 321 THOUSANDS/UL (ref 149–390)
PMV BLD AUTO: 10.6 FL (ref 8.9–12.7)
POTASSIUM SERPL-SCNC: 3.6 MMOL/L (ref 3.5–5.3)
PR INTERVAL: 162 MS
QRS AXIS: 65 DEGREES
QRSD INTERVAL: 78 MS
QT INTERVAL: 412 MS
QTC INTERVAL: 431 MS
RBC # BLD AUTO: 2.86 MILLION/UL (ref 3.81–5.12)
SODIUM SERPL-SCNC: 130 MMOL/L (ref 135–147)
T WAVE AXIS: 36 DEGREES
VENTRICULAR RATE: 66 BPM
WBC # BLD AUTO: 10.8 THOUSAND/UL (ref 4.31–10.16)

## 2024-01-15 PROCEDURE — 82948 REAGENT STRIP/BLOOD GLUCOSE: CPT

## 2024-01-15 PROCEDURE — 85025 COMPLETE CBC W/AUTO DIFF WBC: CPT

## 2024-01-15 PROCEDURE — 83735 ASSAY OF MAGNESIUM: CPT

## 2024-01-15 PROCEDURE — 93005 ELECTROCARDIOGRAM TRACING: CPT

## 2024-01-15 PROCEDURE — 99232 SBSQ HOSP IP/OBS MODERATE 35: CPT

## 2024-01-15 PROCEDURE — 80048 BASIC METABOLIC PNL TOTAL CA: CPT

## 2024-01-15 RX ORDER — MAGNESIUM SULFATE HEPTAHYDRATE 40 MG/ML
4 INJECTION, SOLUTION INTRAVENOUS ONCE
Qty: 100 ML | Refills: 0 | Status: COMPLETED | OUTPATIENT
Start: 2024-01-15 | End: 2024-01-15

## 2024-01-15 RX ORDER — SODIUM CHLORIDE 9 MG/ML
100 INJECTION, SOLUTION INTRAVENOUS CONTINUOUS
Status: DISCONTINUED | OUTPATIENT
Start: 2024-01-15 | End: 2024-01-18

## 2024-01-15 RX ADMIN — FOLIC ACID 1 MG: 1 TABLET ORAL at 08:19

## 2024-01-15 RX ADMIN — Medication 1 TABLET: at 08:19

## 2024-01-15 RX ADMIN — ESCITALOPRAM OXALATE 10 MG: 10 TABLET ORAL at 08:19

## 2024-01-15 RX ADMIN — POTASSIUM CHLORIDE 40 MEQ: 1500 TABLET, EXTENDED RELEASE ORAL at 08:19

## 2024-01-15 RX ADMIN — THIAMINE HCL TAB 100 MG 100 MG: 100 TAB at 08:19

## 2024-01-15 RX ADMIN — SODIUM CHLORIDE 50 ML/HR: 0.9 INJECTION, SOLUTION INTRAVENOUS at 15:33

## 2024-01-15 RX ADMIN — LORAZEPAM 0.5 MG: 0.5 TABLET ORAL at 18:18

## 2024-01-15 RX ADMIN — ONDANSETRON 4 MG: 2 INJECTION INTRAMUSCULAR; INTRAVENOUS at 06:25

## 2024-01-15 RX ADMIN — PANTOPRAZOLE SODIUM 40 MG: 40 TABLET, DELAYED RELEASE ORAL at 06:25

## 2024-01-15 RX ADMIN — MAGNESIUM SULFATE HEPTAHYDRATE 4 G: 40 INJECTION, SOLUTION INTRAVENOUS at 17:05

## 2024-01-15 RX ADMIN — POTASSIUM CHLORIDE 40 MEQ: 1500 TABLET, EXTENDED RELEASE ORAL at 17:12

## 2024-01-15 NOTE — ASSESSMENT & PLAN NOTE
Patient was transferred for [facility] detox unit to [facility] ICU on 1/5 for hematemesis  Pt was evaluated by GI team and initially placed on octreotide infusion  CT scan with evidence of varices and portal HTN  Received IV Protonix, and Rocephin for SBP prophylaxis (completed day 7 today)  Octreotide drip was discontinued  Had associated ABLA and hypotension and was transfused 1u PRBC on 1/8  Underwent EGD 1/8/2024-normal esophagus, no esophageal or gastric varices, 3 cm hiatal hernia, likely Bill's esophagus, mild portal hypertensive gastropathy and body of stomach, first part of duodenum and second part of duodenum appearing normal.  No old or fresh blood.   No further signs or evidence of bleeding.  Hemoglobin stable 9.3

## 2024-01-15 NOTE — ASSESSMENT & PLAN NOTE
Patient was significantly encephalopathic, lethargic with asterixis on exam, now resolved alert and oriented x 3  Received thiamine protocol for concern for Warnicke's  Ammonia level noted to be 178 on admission  Was started on lactulose and Xifaxan, patient has been refusing laculose has been having 10+ bowel movements with each dose will decrease to as needed daily to achieve 2-3 bowel movents  Was started on rifaximin 550 mg BID sent for price check, not covered by insurance will require prior auth. Monitor off can consider starting in outpatient setting   Monitor mentation

## 2024-01-15 NOTE — UTILIZATION REVIEW
Continued Stay Review    Date: 1/15                          Current Patient Class: IP  Current Level of Care: MS    HPI:48 y.o. female initially admitted on 1/5     Assessment/Plan:   Hgb stable today 9.3 up from 8.7 yesterday.  Still with Low .  Pt on Lexapro for  her anxiety which is a trigger for ETOH.  Pt now on oral Thiamine and folic acid.   Pt c/o dizziness while walking today.  Is back on gentle IV fluids, used 1 dose IV Zofran today.  On exam skin remains jaundiced. Lactulose is PRN for pt to have 2-3 BM daily.  Not used today.      Vital Signs:   Date/Time Temp Pulse Resp BP MAP (mmHg) SpO2 O2 Device Patient Position - Orthostatic VS   01/15/24 1347 -- 68 -- 89/69 Abnormal  -- -- -- Lying   01/15/24 1130 98 °F (36.7 °C) 76 18 88/60 Abnormal  -- 98 % None (Room air) Lying   01/15/24 0745 98 °F (36.7 °C) 70 18 85/60 Abnormal  -- 98 % None (Room air) Lying   01/14/24 2218 97.6 °F (36.4 °C) 71 18 92/58 -- 98 % None (Room air) Lying   01/14/24 1528 98.9 °F (37.2 °C) 68 18 101/60 75 100 % None (Room air) Lying   01/14/24 0524 99.9 °F (37.7 °C) 63 16 97/62 72 97 % None (Room air) Lying   01/13/24 2307 97.2 °F (36.2 °C) Abnormal  75 16 98/56 72 98 % None (Room air) Lying   01/13/24 1753 99 °F (37.2 °C) -- -- -- -- -- -- --   01/13/24 1537 99.1 °F (37.3 °C) 74 18 92/58 -- 96 % None (Room air) Lying   01/13/24 1344 -- -- -- 95/62 -- -- -- --   01/13/24 0807 -- -- -- -- -- -- None (Room air) --   01/13/24 0711 97.8 °F (36.6 °C) 62 16 78/53 Abnormal  61 Abnormal  97 % None (Room air) Lying   01/13/24 0419 -- 68 -- 91/58 70 -- -- Lying   01/13/24 0237 -- 62 -- 80/42 Abnormal  -- -- -- Lying       Pertinent Labs/Diagnostic Results:       Results from last 7 days   Lab Units 01/15/24  0805 01/14/24  0517 01/13/24  0810 01/12/24  0528 01/11/24  0855 01/10/24  0430   WBC Thousand/uL 10.80* 9.42 10.68* 8.92 9.47 7.64   HEMOGLOBIN g/dL 9.3* 8.7* 9.3* 8.6* 9.1* 8.6*   HEMATOCRIT % 26.9* 25.4* 27.0* 25.3* 26.9* 25.3*    PLATELETS Thousands/uL 321 255 232 168 155 147*   NEUTROS ABS Thousands/µL 7.35 5.82  --   --   --  4.49         Results from last 7 days   Lab Units 01/15/24  0805 01/14/24  0517 01/13/24  0810 01/12/24  1343 01/12/24  0528 01/11/24  1321 01/11/24  0855 01/10/24  0430 01/09/24  0535   SODIUM mmol/L 130* 132* 130*  --  130* 129*   < > 134* 135   POTASSIUM mmol/L 3.6 3.7 3.3*  --  3.0* 3.4*   < > 2.8* 3.2*   CHLORIDE mmol/L 106 108 105  --  103 106   < > 106 112*   CO2 mmol/L 17* 19* 17*  --  19* 16*   < > 16* 16*   ANION GAP mmol/L 7 5 8  --  8 7   < > 12 7   BUN mg/dL 4* 4* 4*  --  3* 2*   < > 2* 2*   CREATININE mg/dL 0.38* 0.35* 0.39*  --  0.28* 0.31*   < > 0.37* 0.34*   EGFR ml/min/1.73sq m 126 130 125  --  139 135   < > 127 131   CALCIUM mg/dL 8.8 8.3* 8.3*  --  8.3* 7.8*   < > 7.4* 7.4*   MAGNESIUM mg/dL 1.4* 1.4* 1.3* 1.9 1.2* 1.5*   < > 1.3* 1.4*   PHOSPHORUS mg/dL  --   --   --   --   --   --   --  1.8* 2.0*    < > = values in this interval not displayed.     Results from last 7 days   Lab Units 01/14/24  0517 01/13/24  0810 01/12/24  0528 01/11/24  0855 01/10/24  0430   AST U/L 71* 81* 43* 42* 38   ALT U/L 18 19 14 17 13   ALK PHOS U/L 115* 122* 123* 135* 116*   TOTAL PROTEIN g/dL 5.9* 6.2* 5.3* 5.3* 4.7*   ALBUMIN g/dL 3.3* 3.6 3.5 2.8* 2.8*   TOTAL BILIRUBIN mg/dL 2.61* 3.16* 3.15* 2.91* 2.75*   AMMONIA umol/L  --   --  135*  --   --      Results from last 7 days   Lab Units 01/15/24  1345 01/14/24  1603 01/14/24  1120 01/14/24  0959 01/13/24  1124 01/13/24  0727 01/12/24  1123 01/12/24  0731 01/11/24  1647 01/11/24  1106 01/11/24  0716   POC GLUCOSE mg/dl 89 142* 100 121 107 110 127 92 99 116 123     Results from last 7 days   Lab Units 01/15/24  0805 01/14/24  0517 01/13/24  0810 01/12/24  0528 01/11/24  1321 01/11/24  0855 01/10/24  0430 01/09/24  0535   GLUCOSE RANDOM mg/dL 117 87 121 84 90 125 151* 131     Results from last 7 days   Lab Units 01/12/24  0450   OSMOLALITY, SERUM mmol/*          BETA-HYDROXYBUTYRATE   Date Value Ref Range Status   01/04/2024 2.8 (H) <0.6 mmol/L Final      Results from last 7 days   Lab Units 01/10/24  0430 01/09/24  0535   PROTIME seconds 18.1* 18.2*   INR  1.47* 1.49*     Results from last 7 days   Lab Units 01/09/24  0530   UNIT PRODUCT CODE  [id number]   UNIT NUMBER  [id number]   UNITABO  O   UNITRH  NEG   CROSSMATCH  Compatible   UNIT DISPENSE STATUS  Presumed Trans   UNIT PRODUCT VOL mL 350     Results from last 7 days   Lab Units 01/12/24  0829 01/12/24  0450   OSMOLALITY, SERUM mmol/KG  --  267*   OSMO UR mmol/*  --      Results from last 7 days   Lab Units 01/12/24  0829   SODIUM UR  85     Medications:   Scheduled Medications:  chlorhexidine, 15 mL, Mouth/Throat, Q12H VARGAS  escitalopram, 10 mg, Oral, Daily  folic acid, 1 mg, Oral, Daily  magnesium sulfate, 4 g, Intravenous, Once  multivitamin-minerals, 1 tablet, Oral, Daily  nicotine, 14 mg, Transdermal, Daily  pantoprazole, 40 mg, Oral, Early Morning  potassium chloride, 40 mEq, Oral, BID  thiamine, 100 mg, Oral, Daily      Continuous IV Infusions:  sodium chloride, 50 mL/hr, Intravenous, Continuous      PRN Meds:  lactulose, 10 g, Oral, Daily PRN  LORazepam, 0.5 mg, Oral, Daily PRN - x 1 1/14  ondansetron, 4 mg, Intravenous, Q6H PRN - x 1 1/15    Discharge Plan: D, is accepting of HOST services.     Network Utilization Review Department  ATTENTION: Please call with any questions or concerns to [phone] and carefully listen to the prompts so that you are directed to the right person. All voicemails are confidential.   For Discharge needs, contact Care Management DC Support Team at [phone] opt. 2  Send all requests for admission clinical reviews, approved or denied determinations and any other requests to dedicated fax number below belonging to the campus where the patient is receiving treatment. List of dedicated fax numbers for the Facilities:  FACILITY NAME UR FAX NUMBER   ADMISSION DENIALS  (Administrative/Medical Necessity) [phone]   DISCHARGE SUPPORT TEAM (NETWORK) [phone]   PARENT CHILD HEALTH (Maternity/NICU/Pediatrics) [phone]   [facility] [phone]   [facility] [phone]   [facility] [phone]   [facility] [phone]   [facility] [phone]   [facility] [phone]   [facility] [phone]   [facility] [phone]   [facility] [phone]   [facility] [phone]   [facility] [phone]

## 2024-01-15 NOTE — ASSESSMENT & PLAN NOTE
Pt presented to [facility] Detox unit with acute alcoholic hepatitis  Initial  and T bili 4.7  Pt was tx with supportive measures:  discriminant function <32 and steroids not indicated  Repeated discriminant function:  5--> steroids again not indicated  AST improved to 71, T. bili 2.61  Trend HFP

## 2024-01-15 NOTE — PLAN OF CARE
Problem: Prexisting or High Potential for Compromised Skin Integrity  Goal: Skin integrity is maintained or improved  Description: INTERVENTIONS:  - Identify patients at risk for skin breakdown  - Assess and monitor skin integrity  - Assess and monitor nutrition and hydration status  - Monitor labs   - Assess for incontinence   - Turn and reposition patient  - Assist with mobility/ambulation  - Relieve pressure over bony prominences  - Avoid friction and shearing  - Provide appropriate hygiene as needed including keeping skin clean and dry  - Evaluate need for skin moisturizer/barrier cream  - Collaborate with interdisciplinary team   - Patient/family teaching  - Consider wound care consult   Outcome: Progressing     Problem: PAIN - ADULT  Goal: Verbalizes/displays adequate comfort level or baseline comfort level  Description: Interventions:  - Encourage patient to monitor pain and request assistance  - Assess pain using appropriate pain scale  - Administer analgesics based on type and severity of pain and evaluate response  - Implement non-pharmacological measures as appropriate and evaluate response  - Consider cultural and social influences on pain and pain management  - Notify physician/advanced practitioner if interventions unsuccessful or patient reports new pain  Outcome: Progressing     Problem: INFECTION - ADULT  Goal: Absence or prevention of progression during hospitalization  Description: INTERVENTIONS:  - Assess and monitor for signs and symptoms of infection  - Monitor lab/diagnostic results  - Monitor all insertion sites, i.e. indwelling lines, tubes, and drains  - Monitor endotracheal if appropriate and nasal secretions for changes in amount and color  - [place] appropriate cooling/warming therapies per order  - Administer medications as ordered  - Instruct and encourage patient and family to use good hand hygiene technique  - Identify and instruct in appropriate isolation precautions for  identified infection/condition  Outcome: Progressing  Goal: Absence of fever/infection during neutropenic period  Description: INTERVENTIONS:  - Monitor WBC    Outcome: Progressing     Problem: SAFETY ADULT  Goal: Patient will remain free of falls  Description: INTERVENTIONS:  - Educate patient/family on patient safety including physical limitations  - Instruct patient to call for assistance with activity   - Consult OT/PT to assist with strengthening/mobility   - Keep Call bell within reach  - Keep bed low and locked with side rails adjusted as appropriate  - Keep care items and personal belongings within reach  - Initiate and maintain comfort rounds  - Make Fall Risk Sign visible to staff  - Offer Toileting every 2 Hours, in advance of need  - Initiate/Maintain bed alarm  - Apply yellow socks and bracelet for high fall risk patients  - Consider moving patient to room near nurses station  Outcome: Progressing  Goal: Maintain or return to baseline ADL function  Description: INTERVENTIONS:  -  Assess patient's ability to carry out ADLs; assess patient's baseline for ADL function and identify physical deficits which impact ability to perform ADLs (bathing, care of mouth/teeth, toileting, grooming, dressing, etc.)  - Assess/evaluate cause of self-care deficits   - Assess range of motion  - Assess patient's mobility; develop plan if impaired  - Assess patient's need for assistive devices and provide as appropriate  - Encourage maximum independence but intervene and supervise when necessary  - Involve family in performance of ADLs  - Assess for home care needs following discharge   - Consider OT consult to assist with ADL evaluation and planning for discharge  - Provide patient education as appropriate  Outcome: Progressing  Goal: Maintains/Returns to pre admission functional level  Description: INTERVENTIONS:  - Perform AM-PAC 6 Click Basic Mobility/ Daily Activity assessment daily.  - Set and communicate daily mobility  goal to care team and patient/family/caregiver.   - Collaborate with rehabilitation services on mobility goals if consulted  - Out of bed for toileting  - Record patient progress and toleration of activity level   Outcome: Progressing     Problem: DISCHARGE PLANNING  Goal: Discharge to home or other facility with appropriate resources  Description: INTERVENTIONS:  - Identify barriers to discharge w/patient and caregiver  - Arrange for needed discharge resources and transportation as appropriate  - Identify discharge learning needs (meds, wound care, etc.)  - Arrange for interpretive services to assist at discharge as needed  - Refer to Case Management Department for coordinating discharge planning if the patient needs post-hospital services based on physician/advanced practitioner order or complex needs related to functional status, cognitive ability, or social support system  Outcome: Progressing     Problem: Knowledge Deficit  Goal: Patient/family/caregiver demonstrates understanding of disease process, treatment plan, medications, and discharge instructions  Description: Complete learning assessment and assess knowledge base.  Interventions:  - Provide teaching at level of understanding  - Provide teaching via preferred learning methods  Outcome: Progressing     Problem: Nutrition/Hydration-ADULT  Goal: Nutrient/Hydration intake appropriate for improving, restoring or maintaining nutritional needs  Description: Monitor and assess patient's nutrition/hydration status for malnutrition. Collaborate with interdisciplinary team and initiate plan and interventions as ordered.  Monitor patient's weight and dietary intake as ordered or per policy. Utilize nutrition screening tool and intervene as necessary. Determine patient's food preferences and provide high-protein, high-caloric foods as appropriate.     INTERVENTIONS:  - Monitor oral intake, urinary output, labs, and treatment plans  - Assess nutrition and hydration  status and recommend course of action  - Evaluate amount of meals eaten  - Assist patient with eating if necessary   - Allow adequate time for meals  - Recommend/ encourage appropriate diets, oral nutritional supplements, and vitamin/mineral supplements  - Order, calculate, and assess calorie counts as needed  - Recommend, monitor, and adjust tube feedings and TPN/PPN based on assessed needs  - Assess need for intravenous fluids  - Provide specific nutrition/hydration education as appropriate  - Include patient/family/caregiver in decisions related to nutrition  Outcome: Progressing     Problem: NEUROSENSORY - ADULT  Goal: Achieves stable or improved neurological status  Description: INTERVENTIONS  - Monitor and report changes in neurological status  - Monitor vital signs such as temperature, blood pressure, glucose, and any other labs ordered   - Initiate measures to prevent increased intracranial pressure  - Monitor for seizure activity and implement precautions if appropriate      Outcome: Progressing  Goal: Achieves maximal functionality and self care  Description: INTERVENTIONS  - Monitor swallowing and airway patency with patient fatigue and changes in neurological status  - Encourage and assist patient to increase activity and self care.   - Encourage visually impaired, hearing impaired and aphasic patients to use assistive/communication devices  Outcome: Progressing     Problem: GASTROINTESTINAL - ADULT  Goal: Minimal or absence of nausea and/or vomiting  Description: INTERVENTIONS:  - Administer IV fluids if ordered to ensure adequate hydration  - Maintain NPO status until nausea and vomiting are resolved  - Nasogastric tube if ordered  - Administer ordered antiemetic medications as needed  - Provide nonpharmacologic comfort measures as appropriate  - Advance diet as tolerated, if ordered  - Consider nutrition services referral to assist patient with adequate nutrition and appropriate food choices  Outcome:  Progressing  Goal: Maintains adequate nutritional intake  Description: INTERVENTIONS:  - Monitor percentage of each meal consumed  - Identify factors contributing to decreased intake, treat as appropriate  - Assist with meals as needed  - Monitor I&O, weight, and lab values if indicated  - Obtain nutrition services referral as needed  Outcome: Progressing  Goal: Oral mucous membranes remain intact  Description: INTERVENTIONS  - Assess oral mucosa and hygiene practices  - Implement preventative oral hygiene regimen  - Implement oral medicated treatments as ordered  - Initiate Nutrition services referral as needed  Outcome: Progressing     Problem: GENITOURINARY - ADULT  Goal: Absence of urinary retention  Description: INTERVENTIONS:  - Assess patient’s ability to void and empty bladder  - Monitor I/O  - Bladder scan as needed  - Discuss with physician/AP medications to alleviate retention as needed  - Discuss catheterization for long term situations as appropriate  Outcome: Progressing     Problem: METABOLIC, FLUID AND ELECTROLYTES - ADULT  Goal: Electrolytes maintained within normal limits  Description: INTERVENTIONS:  - Monitor labs and assess patient for signs and symptoms of electrolyte imbalances  - Administer electrolyte replacement as ordered  - Monitor response to electrolyte replacements, including repeat lab results as appropriate  - Instruct patient on fluid and nutrition as appropriate  Outcome: Progressing  Goal: Fluid balance maintained  Description: INTERVENTIONS:  - Monitor labs   - Monitor I/O and WT  - Instruct patient on fluid and nutrition as appropriate  - Assess for signs & symptoms of volume excess or deficit  Outcome: Progressing     Problem: SKIN/TISSUE INTEGRITY - ADULT  Goal: Skin Integrity remains intact(Skin Breakdown Prevention)  Description: Assess:  -Perform Leon assessment every   -Clean and moisturize skin every   -Inspect skin when repositioning, toileting, and assisting with  ADLS  -Assess under medical devices such as  every   -Assess extremities for adequate circulation and sensation     Bed Management:  -Have minimal linens on bed & keep smooth, unwrinkled  -Change linens as needed when moist or perspiring  -Avoid sitting or lying in one position for more than  hours while in bed  -Keep HOB at degrees     Toileting:  -Offer bedside commode  -Assess for incontinence every   -Use incontinent care products after each incontinent episode such as     Activity:  -Mobilize patient  times a day  -Encourage activity and walks on unit  -Encourage or provide ROM exercises   -Turn and reposition patient every  Hours  -Use appropriate equipment to lift or move patient in bed  -Instruct/ Assist with weight shifting every  when out of bed in chair  -Consider limitation of chair time  hour intervals    Skin Care:  -Avoid use of baby powder, tape, friction and shearing, hot water or constrictive clothing  -Relieve pressure over bony prominences using   -Do not massage red bony areas    Next Steps:  -Teach patient strategies to minimize risks such as    -Consider consults to  interdisciplinary teams such as   Outcome: Progressing     Problem: MUSCULOSKELETAL - ADULT  Goal: Maintain or return mobility to safest level of function  Description: INTERVENTIONS:  - Assess patient's ability to carry out ADLs; assess patient's baseline for ADL function and identify physical deficits which impact ability to perform ADLs (bathing, care of mouth/teeth, toileting, grooming, dressing, etc.)  - Assess/evaluate cause of self-care deficits   - Assess range of motion  - Assess patient's mobility  - Assess patient's need for assistive devices and provide as appropriate  - Encourage maximum independence but intervene and supervise when necessary  - Involve family in performance of ADLs  - Assess for home care needs following discharge   - Consider OT consult to assist with ADL evaluation and planning for discharge  -  Provide patient education as appropriate  Outcome: Progressing

## 2024-01-15 NOTE — PLAN OF CARE
Problem: Prexisting or High Potential for Compromised Skin Integrity  Goal: Skin integrity is maintained or improved  Description: INTERVENTIONS:  - Identify patients at risk for skin breakdown  - Assess and monitor skin integrity  - Assess and monitor nutrition and hydration status  - Monitor labs   - Assess for incontinence   - Turn and reposition patient  - Assist with mobility/ambulation  - Relieve pressure over bony prominences  - Avoid friction and shearing  - Provide appropriate hygiene as needed including keeping skin clean and dry  - Evaluate need for skin moisturizer/barrier cream  - Collaborate with interdisciplinary team   - Patient/family teaching  - Consider wound care consult   Outcome: Progressing     Problem: PAIN - ADULT  Goal: Verbalizes/displays adequate comfort level or baseline comfort level  Description: Interventions:  - Encourage patient to monitor pain and request assistance  - Assess pain using appropriate pain scale  - Administer analgesics based on type and severity of pain and evaluate response  - Implement non-pharmacological measures as appropriate and evaluate response  - Consider cultural and social influences on pain and pain management  - Notify physician/advanced practitioner if interventions unsuccessful or patient reports new pain  Outcome: Progressing     Problem: INFECTION - ADULT  Goal: Absence or prevention of progression during hospitalization  Description: INTERVENTIONS:  - Assess and monitor for signs and symptoms of infection  - Monitor lab/diagnostic results  - Monitor all insertion sites, i.e. indwelling lines, tubes, and drains  - Monitor endotracheal if appropriate and nasal secretions for changes in amount and color  - [place] appropriate cooling/warming therapies per order  - Administer medications as ordered  - Instruct and encourage patient and family to use good hand hygiene technique  - Identify and instruct in appropriate isolation precautions for  identified infection/condition  Outcome: Progressing  Goal: Absence of fever/infection during neutropenic period  Description: INTERVENTIONS:  - Monitor WBC    Outcome: Progressing     Problem: SAFETY ADULT  Goal: Patient will remain free of falls  Description: INTERVENTIONS:  - Educate patient/family on patient safety including physical limitations  - Instruct patient to call for assistance with activity   - Consult OT/PT to assist with strengthening/mobility   - Keep Call bell within reach  - Keep bed low and locked with side rails adjusted as appropriate  - Keep care items and personal belongings within reach  - Initiate and maintain comfort rounds  - Make Fall Risk Sign visible to staff  - Offer Toileting every 2 Hours, in advance of need  - Initiate/Maintain bed alarm  - Apply yellow socks and bracelet for high fall risk patients  - Consider moving patient to room near nurses station  Outcome: Progressing  Goal: Maintain or return to baseline ADL function  Description: INTERVENTIONS:  -  Assess patient's ability to carry out ADLs; assess patient's baseline for ADL function and identify physical deficits which impact ability to perform ADLs (bathing, care of mouth/teeth, toileting, grooming, dressing, etc.)  - Assess/evaluate cause of self-care deficits   - Assess range of motion  - Assess patient's mobility; develop plan if impaired  - Assess patient's need for assistive devices and provide as appropriate  - Encourage maximum independence but intervene and supervise when necessary  - Involve family in performance of ADLs  - Assess for home care needs following discharge   - Consider OT consult to assist with ADL evaluation and planning for discharge  - Provide patient education as appropriate  Outcome: Progressing  Goal: Maintains/Returns to pre admission functional level  Description: INTERVENTIONS:  - Perform AM-PAC 6 Click Basic Mobility/ Daily Activity assessment daily.  - Set and communicate daily mobility  goal to care team and patient/family/caregiver.   - Collaborate with rehabilitation services on mobility goals if consulted  - Out of bed for toileting  - Record patient progress and toleration of activity level   Outcome: Progressing     Problem: DISCHARGE PLANNING  Goal: Discharge to home or other facility with appropriate resources  Description: INTERVENTIONS:  - Identify barriers to discharge w/patient and caregiver  - Arrange for needed discharge resources and transportation as appropriate  - Identify discharge learning needs (meds, wound care, etc.)  - Arrange for interpretive services to assist at discharge as needed  - Refer to Case Management Department for coordinating discharge planning if the patient needs post-hospital services based on physician/advanced practitioner order or complex needs related to functional status, cognitive ability, or social support system  Outcome: Progressing     Problem: Knowledge Deficit  Goal: Patient/family/caregiver demonstrates understanding of disease process, treatment plan, medications, and discharge instructions  Description: Complete learning assessment and assess knowledge base.  Interventions:  - Provide teaching at level of understanding  - Provide teaching via preferred learning methods  Outcome: Progressing     Problem: Nutrition/Hydration-ADULT  Goal: Nutrient/Hydration intake appropriate for improving, restoring or maintaining nutritional needs  Description: Monitor and assess patient's nutrition/hydration status for malnutrition. Collaborate with interdisciplinary team and initiate plan and interventions as ordered.  Monitor patient's weight and dietary intake as ordered or per policy. Utilize nutrition screening tool and intervene as necessary. Determine patient's food preferences and provide high-protein, high-caloric foods as appropriate.     INTERVENTIONS:  - Monitor oral intake, urinary output, labs, and treatment plans  - Assess nutrition and hydration  status and recommend course of action  - Evaluate amount of meals eaten  - Assist patient with eating if necessary   - Allow adequate time for meals  - Recommend/ encourage appropriate diets, oral nutritional supplements, and vitamin/mineral supplements  - Order, calculate, and assess calorie counts as needed  - Recommend, monitor, and adjust tube feedings and TPN/PPN based on assessed needs  - Assess need for intravenous fluids  - Provide specific nutrition/hydration education as appropriate  - Include patient/family/caregiver in decisions related to nutrition  Outcome: Progressing     Problem: NEUROSENSORY - ADULT  Goal: Achieves stable or improved neurological status  Description: INTERVENTIONS  - Monitor and report changes in neurological status  - Monitor vital signs such as temperature, blood pressure, glucose, and any other labs ordered   - Initiate measures to prevent increased intracranial pressure  - Monitor for seizure activity and implement precautions if appropriate      Outcome: Progressing  Goal: Achieves maximal functionality and self care  Description: INTERVENTIONS  - Monitor swallowing and airway patency with patient fatigue and changes in neurological status  - Encourage and assist patient to increase activity and self care.   - Encourage visually impaired, hearing impaired and aphasic patients to use assistive/communication devices  Outcome: Progressing     Problem: GASTROINTESTINAL - ADULT  Goal: Minimal or absence of nausea and/or vomiting  Description: INTERVENTIONS:  - Administer IV fluids if ordered to ensure adequate hydration  - Maintain NPO status until nausea and vomiting are resolved  - Nasogastric tube if ordered  - Administer ordered antiemetic medications as needed  - Provide nonpharmacologic comfort measures as appropriate  - Advance diet as tolerated, if ordered  - Consider nutrition services referral to assist patient with adequate nutrition and appropriate food choices  Outcome:  Progressing  Goal: Maintains adequate nutritional intake  Description: INTERVENTIONS:  - Monitor percentage of each meal consumed  - Identify factors contributing to decreased intake, treat as appropriate  - Assist with meals as needed  - Monitor I&O, weight, and lab values if indicated  - Obtain nutrition services referral as needed  Outcome: Progressing  Goal: Oral mucous membranes remain intact  Description: INTERVENTIONS  - Assess oral mucosa and hygiene practices  - Implement preventative oral hygiene regimen  - Implement oral medicated treatments as ordered  - Initiate Nutrition services referral as needed  Outcome: Progressing     Problem: GENITOURINARY - ADULT  Goal: Absence of urinary retention  Description: INTERVENTIONS:  - Assess patient’s ability to void and empty bladder  - Monitor I/O  - Bladder scan as needed  - Discuss with physician/AP medications to alleviate retention as needed  - Discuss catheterization for long term situations as appropriate  Outcome: Progressing     Problem: METABOLIC, FLUID AND ELECTROLYTES - ADULT  Goal: Electrolytes maintained within normal limits  Description: INTERVENTIONS:  - Monitor labs and assess patient for signs and symptoms of electrolyte imbalances  - Administer electrolyte replacement as ordered  - Monitor response to electrolyte replacements, including repeat lab results as appropriate  - Instruct patient on fluid and nutrition as appropriate  Outcome: Progressing  Goal: Fluid balance maintained  Description: INTERVENTIONS:  - Monitor labs   - Monitor I/O and WT  - Instruct patient on fluid and nutrition as appropriate  - Assess for signs & symptoms of volume excess or deficit  Outcome: Progressing     Problem: SKIN/TISSUE INTEGRITY - ADULT  Goal: Skin Integrity remains intact(Skin Breakdown Prevention)  Description: Assess:  -Perform Leon assessment every   -Clean and moisturize skin every   -Inspect skin when repositioning, toileting, and assisting with  ADLS  -Assess under medical devices such as  every Assess extremities for adequate circulation and sensation     Bed Management:  -Have minimal linens on bed & keep smooth, unwrinkled  -Change linens as needed when moist or perspiring  -Avoid sitting or lying in one position for more than  hours while in bed  -Keep HOB at degrees     Toileting:  -Offer bedside commode  -Assess for incontinence every   -Use incontinent care products after each incontinent episode such as     Activity:  -Mobilize patient  times a day  -Encourage activity and walks on unit  -Encourage or provide ROM exercises   -Turn and reposition patient every  Hours  -Use appropriate equipment to lift or move patient in bed  -Instruct/ Assist with weight shifting every  when out of bed in chair  -Consider limitation of chair time  hour intervals    Skin Care:  -Avoid use of baby powder, tape, friction and shearing, hot water or constrictive clothing  -Relieve pressure over bony prominences using   -Do not massage red bony areas    Next Steps:  -Teach patient strategies to minimize risks such as    -Consider consults to  interdisciplinary teams such as   Outcome: Progressing     Problem: MUSCULOSKELETAL - ADULT  Goal: Maintain or return mobility to safest level of function  Description: INTERVENTIONS:  - Assess patient's ability to carry out ADLs; assess patient's baseline for ADL function and identify physical deficits which impact ability to perform ADLs (bathing, care of mouth/teeth, toileting, grooming, dressing, etc.)  - Assess/evaluate cause of self-care deficits   - Assess range of motion  - Assess patient's mobility  - Assess patient's need for assistive devices and provide as appropriate  - Encourage maximum independence but intervene and supervise when necessary  - Involve family in performance of ADLs  - Assess for home care needs following discharge   - Consider OT consult to assist with ADL evaluation and planning for discharge  -  Provide patient education as appropriate  Outcome: Progressing

## 2024-01-15 NOTE — ASSESSMENT & PLAN NOTE
Malnutrition Findings:   Adult Malnutrition type: Chronic illness  Adult Degree of Malnutrition: Other severe protein calorie malnutrition  Malnutrition Characteristics: Muscle loss, Fat loss  360 Statement: severe protein calorie malnutrition related to inadequate protein intake and lack of nutrient-dense foods with excessive ETOH intake as evidenced by  consumption of 8-10 beers daily and hard liquor although she can not quantify how much liquor; severe muscle wasting(temporalis, pectoralis, fat loss (orbital fat pads, triceps), treated with TBD  BMI Findings:  Adult BMI Classifications: Underweight < 18.5  Body mass index is 17.54 kg/m².

## 2024-01-15 NOTE — ASSESSMENT & PLAN NOTE
Patient was initially admitted to [facility] on 1/4/24 and was treated with SEWS protocol with phenobarbital  She was transferred to [facility] ICU 1/6/24 for evaluation of GI bleed   She was started on serax but it was further discontinued 1/7 due to somnolence  S/p CIWA protocol  Pt received iv thiamine 500mg IV q8h x 2 days, then 250mg IV daily x 5 days  Now on oral thiamine and folic acid  Patient reported she has done drinking.  Most of her drinking stems from anxiety.  Started on Lexapro here.  Continue to encourage HOST
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
## 2024-01-15 NOTE — ASSESSMENT & PLAN NOTE
Results from last 7 days   Lab Units 01/15/24  0805 01/14/24  0517 01/13/24  0810 01/12/24  1343 01/12/24  0528   POTASSIUM mmol/L 3.6 3.7 3.3*  --  3.0*   MAGNESIUM mg/dL 1.4* 1.4* 1.3* 1.9 1.2*     Treating for hypomagnesemia and hypokalemia  Potassium stable on 40 mEq twice daily   Monitor intake and output

## 2024-01-15 NOTE — ASSESSMENT & PLAN NOTE
Status post PRBC, IVF and IV albumin  Episodes of hypotension related to gi losses from lactulose decreased dose   Start IVF   Continue to monitor

## 2024-01-15 NOTE — ASSESSMENT & PLAN NOTE
Likely combination of poor oral intake, low solute intake, excessive free water due to beer potomania and GI losses with lactulose, reports 6 loose bowel movements 1/11   Goal BM 2-3 times a day  decreased lactulose to 10 g as needed daily to achieve 2-3 bowel movements  Monitor CMP

## 2024-01-15 NOTE — RESTORATIVE TECHNICIAN NOTE
Restorative Technician Note      Patient Name: Leslye Holland     Restorative Tech Visit Date: 01/15/24  Note Type: Mobility  Patient Position Upon Consult: Supine  Mobility / Activity Provided: assisted pt in ambulating around unit; BP low, RN notified  Activity Performed: Ambulated  Assistive Device: Other (Comment) (none)  Patient Position at End of Consult: Supine; All needs within reach

## 2024-01-16 PROBLEM — E87.8 ELECTROLYTE ABNORMALITY: Status: ACTIVE | Noted: 2024-01-04

## 2024-01-16 LAB
ALBUMIN SERPL BCP-MCNC: 3.5 G/DL (ref 3.5–5)
ALP SERPL-CCNC: 128 U/L (ref 34–104)
ALT SERPL W P-5'-P-CCNC: 21 U/L (ref 7–52)
ANION GAP SERPL CALCULATED.3IONS-SCNC: 7 MMOL/L
AST SERPL W P-5'-P-CCNC: 114 U/L (ref 13–39)
ATRIAL RATE: 58 BPM
BASOPHILS # BLD AUTO: 0.1 THOUSANDS/ÂΜL (ref 0–0.1)
BASOPHILS NFR BLD AUTO: 1 % (ref 0–1)
BILIRUB SERPL-MCNC: 2.65 MG/DL (ref 0.2–1)
BUN SERPL-MCNC: 6 MG/DL (ref 5–25)
CALCIUM SERPL-MCNC: 8.9 MG/DL (ref 8.4–10.2)
CHLORIDE SERPL-SCNC: 107 MMOL/L (ref 96–108)
CO2 SERPL-SCNC: 17 MMOL/L (ref 21–32)
CREAT SERPL-MCNC: 0.33 MG/DL (ref 0.6–1.3)
EOSINOPHIL # BLD AUTO: 0.11 THOUSAND/ÂΜL (ref 0–0.61)
EOSINOPHIL NFR BLD AUTO: 1 % (ref 0–6)
ERYTHROCYTE [DISTWIDTH] IN BLOOD BY AUTOMATED COUNT: 15.8 % (ref 11.6–15.1)
GFR SERPL CREATININE-BSD FRML MDRD: 132 ML/MIN/1.73SQ M
GLUCOSE SERPL-MCNC: 107 MG/DL (ref 65–140)
GLUCOSE SERPL-MCNC: 111 MG/DL (ref 65–140)
GLUCOSE SERPL-MCNC: 144 MG/DL (ref 65–140)
GLUCOSE SERPL-MCNC: 149 MG/DL (ref 65–140)
GLUCOSE SERPL-MCNC: 74 MG/DL (ref 65–140)
HCT VFR BLD AUTO: 28.7 % (ref 34.8–46.1)
HGB BLD-MCNC: 8.5 G/DL (ref 11.5–15.4)
IMM GRANULOCYTES # BLD AUTO: 0.05 THOUSAND/UL (ref 0–0.2)
IMM GRANULOCYTES NFR BLD AUTO: 0 % (ref 0–2)
LYMPHOCYTES # BLD AUTO: 1.98 THOUSANDS/ÂΜL (ref 0.6–4.47)
LYMPHOCYTES NFR BLD AUTO: 18 % (ref 14–44)
MAGNESIUM SERPL-MCNC: 1.5 MG/DL (ref 1.9–2.7)
MCH RBC QN AUTO: 32.3 PG (ref 26.8–34.3)
MCHC RBC AUTO-ENTMCNC: 29.6 G/DL (ref 31.4–37.4)
MCV RBC AUTO: 109 FL (ref 82–98)
MONOCYTES # BLD AUTO: 0.88 THOUSAND/ÂΜL (ref 0.17–1.22)
MONOCYTES NFR BLD AUTO: 8 % (ref 4–12)
NEUTROPHILS # BLD AUTO: 8.14 THOUSANDS/ÂΜL (ref 1.85–7.62)
NEUTS SEG NFR BLD AUTO: 72 % (ref 43–75)
NRBC BLD AUTO-RTO: 0 /100 WBCS
P AXIS: 30 DEGREES
PLATELET # BLD AUTO: 322 THOUSANDS/UL (ref 149–390)
PMV BLD AUTO: 10.6 FL (ref 8.9–12.7)
POTASSIUM SERPL-SCNC: 4.6 MMOL/L (ref 3.5–5.3)
PR INTERVAL: 144 MS
PROT SERPL-MCNC: 6.6 G/DL (ref 6.4–8.4)
QRS AXIS: 39 DEGREES
QRSD INTERVAL: 76 MS
QT INTERVAL: 430 MS
QTC INTERVAL: 422 MS
RBC # BLD AUTO: 2.63 MILLION/UL (ref 3.81–5.12)
SODIUM SERPL-SCNC: 131 MMOL/L (ref 135–147)
T WAVE AXIS: 29 DEGREES
VENTRICULAR RATE: 58 BPM
WBC # BLD AUTO: 11.26 THOUSAND/UL (ref 4.31–10.16)

## 2024-01-16 PROCEDURE — 80053 COMPREHEN METABOLIC PANEL: CPT

## 2024-01-16 PROCEDURE — 85025 COMPLETE CBC W/AUTO DIFF WBC: CPT

## 2024-01-16 PROCEDURE — 93005 ELECTROCARDIOGRAM TRACING: CPT

## 2024-01-16 PROCEDURE — 83735 ASSAY OF MAGNESIUM: CPT

## 2024-01-16 PROCEDURE — 82948 REAGENT STRIP/BLOOD GLUCOSE: CPT

## 2024-01-16 PROCEDURE — 84439 ASSAY OF FREE THYROXINE: CPT | Performed by: PHYSICIAN ASSISTANT

## 2024-01-16 PROCEDURE — 84443 ASSAY THYROID STIM HORMONE: CPT | Performed by: PHYSICIAN ASSISTANT

## 2024-01-16 PROCEDURE — 99232 SBSQ HOSP IP/OBS MODERATE 35: CPT | Performed by: PHYSICIAN ASSISTANT

## 2024-01-16 RX ORDER — LANOLIN ALCOHOL/MO/W.PET/CERES
400 CREAM (GRAM) TOPICAL 2 TIMES DAILY
Status: COMPLETED | OUTPATIENT
Start: 2024-01-16 | End: 2024-01-16

## 2024-01-16 RX ORDER — MAGNESIUM SULFATE HEPTAHYDRATE 40 MG/ML
2 INJECTION, SOLUTION INTRAVENOUS ONCE
Status: DISCONTINUED | OUTPATIENT
Start: 2024-01-16 | End: 2024-01-16

## 2024-01-16 RX ADMIN — MAGNESIUM SULFATE HEPTAHYDRATE 2 G: 40 INJECTION, SOLUTION INTRAVENOUS at 11:20

## 2024-01-16 RX ADMIN — FOLIC ACID 1 MG: 1 TABLET ORAL at 09:17

## 2024-01-16 RX ADMIN — POTASSIUM CHLORIDE 40 MEQ: 1500 TABLET, EXTENDED RELEASE ORAL at 17:16

## 2024-01-16 RX ADMIN — ESCITALOPRAM OXALATE 10 MG: 10 TABLET ORAL at 09:17

## 2024-01-16 RX ADMIN — MAGNESIUM OXIDE TAB 400 MG (241.3 MG ELEMENTAL MG) 400 MG: 400 (241.3 MG) TAB at 13:19

## 2024-01-16 RX ADMIN — POTASSIUM CHLORIDE 40 MEQ: 1500 TABLET, EXTENDED RELEASE ORAL at 09:17

## 2024-01-16 RX ADMIN — PANTOPRAZOLE SODIUM 40 MG: 40 TABLET, DELAYED RELEASE ORAL at 06:40

## 2024-01-16 RX ADMIN — LORAZEPAM 0.5 MG: 0.5 TABLET ORAL at 19:36

## 2024-01-16 RX ADMIN — MAGNESIUM OXIDE TAB 400 MG (241.3 MG ELEMENTAL MG) 400 MG: 400 (241.3 MG) TAB at 17:16

## 2024-01-16 RX ADMIN — THIAMINE HCL TAB 100 MG 100 MG: 100 TAB at 09:17

## 2024-01-16 RX ADMIN — Medication 1 TABLET: at 09:17

## 2024-01-16 NOTE — ASSESSMENT & PLAN NOTE
Pt presented to [facility] Detox unit with acute alcoholic hepatitis  Initial  and T bili 4.7  Pt was tx with supportive measures:  discriminant function <32 and steroids not indicated  Discriminant function:  5--> steroids again not indicated  AST improved to 71, T. bili 2.61

## 2024-01-16 NOTE — PLAN OF CARE
Problem: Prexisting or High Potential for Compromised Skin Integrity  Goal: Skin integrity is maintained or improved  Description: INTERVENTIONS:  - Identify patients at risk for skin breakdown  - Assess and monitor skin integrity  - Assess and monitor nutrition and hydration status  - Monitor labs   - Assess for incontinence   - Turn and reposition patient  - Assist with mobility/ambulation  - Relieve pressure over bony prominences  - Avoid friction and shearing  - Provide appropriate hygiene as needed including keeping skin clean and dry  - Evaluate need for skin moisturizer/barrier cream  - Collaborate with interdisciplinary team   - Patient/family teaching  - Consider wound care consult   Outcome: Progressing     Problem: PAIN - ADULT  Goal: Verbalizes/displays adequate comfort level or baseline comfort level  Description: Interventions:  - Encourage patient to monitor pain and request assistance  - Assess pain using appropriate pain scale  - Administer analgesics based on type and severity of pain and evaluate response  - Implement non-pharmacological measures as appropriate and evaluate response  - Consider cultural and social influences on pain and pain management  - Notify physician/advanced practitioner if interventions unsuccessful or patient reports new pain  Outcome: Progressing     Problem: INFECTION - ADULT  Goal: Absence or prevention of progression during hospitalization  Description: INTERVENTIONS:  - Assess and monitor for signs and symptoms of infection  - Monitor lab/diagnostic results  - Monitor all insertion sites, i.e. indwelling lines, tubes, and drains  - Monitor endotracheal if appropriate and nasal secretions for changes in amount and color  - [place] appropriate cooling/warming therapies per order  - Administer medications as ordered  - Instruct and encourage patient and family to use good hand hygiene technique  - Identify and instruct in appropriate isolation precautions for  identified infection/condition  Outcome: Progressing  Goal: Absence of fever/infection during neutropenic period  Description: INTERVENTIONS:  - Monitor WBC    Outcome: Progressing     Problem: SAFETY ADULT  Goal: Patient will remain free of falls  Description: INTERVENTIONS:  - Educate patient/family on patient safety including physical limitations  - Instruct patient to call for assistance with activity   - Consult OT/PT to assist with strengthening/mobility   - Keep Call bell within reach  - Keep bed low and locked with side rails adjusted as appropriate  - Keep care items and personal belongings within reach  - Initiate and maintain comfort rounds  - Make Fall Risk Sign visible to staff  - Offer Toileting every 2 Hours, in advance of need  - Initiate/Maintain bed alarm  - Apply yellow socks and bracelet for high fall risk patients  - Consider moving patient to room near nurses station  Outcome: Progressing  Goal: Maintain or return to baseline ADL function  Description: INTERVENTIONS:  -  Assess patient's ability to carry out ADLs; assess patient's baseline for ADL function and identify physical deficits which impact ability to perform ADLs (bathing, care of mouth/teeth, toileting, grooming, dressing, etc.)  - Assess/evaluate cause of self-care deficits   - Assess range of motion  - Assess patient's mobility; develop plan if impaired  - Assess patient's need for assistive devices and provide as appropriate  - Encourage maximum independence but intervene and supervise when necessary  - Involve family in performance of ADLs  - Assess for home care needs following discharge   - Consider OT consult to assist with ADL evaluation and planning for discharge  - Provide patient education as appropriate  Outcome: Progressing  Goal: Maintains/Returns to pre admission functional level  Description: INTERVENTIONS:  - Perform AM-PAC 6 Click Basic Mobility/ Daily Activity assessment daily.  - Set and communicate daily mobility  goal to care team and patient/family/caregiver.   - Collaborate with rehabilitation services on mobility goals if consulted  - Out of bed for toileting  - Record patient progress and toleration of activity level   Outcome: Progressing     Problem: DISCHARGE PLANNING  Goal: Discharge to home or other facility with appropriate resources  Description: INTERVENTIONS:  - Identify barriers to discharge w/patient and caregiver  - Arrange for needed discharge resources and transportation as appropriate  - Identify discharge learning needs (meds, wound care, etc.)  - Arrange for interpretive services to assist at discharge as needed  - Refer to Case Management Department for coordinating discharge planning if the patient needs post-hospital services based on physician/advanced practitioner order or complex needs related to functional status, cognitive ability, or social support system  Outcome: Progressing     Problem: Knowledge Deficit  Goal: Patient/family/caregiver demonstrates understanding of disease process, treatment plan, medications, and discharge instructions  Description: Complete learning assessment and assess knowledge base.  Interventions:  - Provide teaching at level of understanding  - Provide teaching via preferred learning methods  Outcome: Progressing     Problem: Nutrition/Hydration-ADULT  Goal: Nutrient/Hydration intake appropriate for improving, restoring or maintaining nutritional needs  Description: Monitor and assess patient's nutrition/hydration status for malnutrition. Collaborate with interdisciplinary team and initiate plan and interventions as ordered.  Monitor patient's weight and dietary intake as ordered or per policy. Utilize nutrition screening tool and intervene as necessary. Determine patient's food preferences and provide high-protein, high-caloric foods as appropriate.     INTERVENTIONS:  - Monitor oral intake, urinary output, labs, and treatment plans  - Assess nutrition and hydration  status and recommend course of action  - Evaluate amount of meals eaten  - Assist patient with eating if necessary   - Allow adequate time for meals  - Recommend/ encourage appropriate diets, oral nutritional supplements, and vitamin/mineral supplements  - Order, calculate, and assess calorie counts as needed  - Recommend, monitor, and adjust tube feedings and TPN/PPN based on assessed needs  - Assess need for intravenous fluids  - Provide specific nutrition/hydration education as appropriate  - Include patient/family/caregiver in decisions related to nutrition  Outcome: Progressing     Problem: NEUROSENSORY - ADULT  Goal: Achieves stable or improved neurological status  Description: INTERVENTIONS  - Monitor and report changes in neurological status  - Monitor vital signs such as temperature, blood pressure, glucose, and any other labs ordered   - Initiate measures to prevent increased intracranial pressure  - Monitor for seizure activity and implement precautions if appropriate      Outcome: Progressing  Goal: Achieves maximal functionality and self care  Description: INTERVENTIONS  - Monitor swallowing and airway patency with patient fatigue and changes in neurological status  - Encourage and assist patient to increase activity and self care.   - Encourage visually impaired, hearing impaired and aphasic patients to use assistive/communication devices  Outcome: Progressing     Problem: GASTROINTESTINAL - ADULT  Goal: Minimal or absence of nausea and/or vomiting  Description: INTERVENTIONS:  - Administer IV fluids if ordered to ensure adequate hydration  - Maintain NPO status until nausea and vomiting are resolved  - Nasogastric tube if ordered  - Administer ordered antiemetic medications as needed  - Provide nonpharmacologic comfort measures as appropriate  - Advance diet as tolerated, if ordered  - Consider nutrition services referral to assist patient with adequate nutrition and appropriate food choices  Outcome:  Progressing  Goal: Maintains adequate nutritional intake  Description: INTERVENTIONS:  - Monitor percentage of each meal consumed  - Identify factors contributing to decreased intake, treat as appropriate  - Assist with meals as needed  - Monitor I&O, weight, and lab values if indicated  - Obtain nutrition services referral as needed  Outcome: Progressing  Goal: Oral mucous membranes remain intact  Description: INTERVENTIONS  - Assess oral mucosa and hygiene practices  - Implement preventative oral hygiene regimen  - Implement oral medicated treatments as ordered  - Initiate Nutrition services referral as needed  Outcome: Progressing     Problem: GENITOURINARY - ADULT  Goal: Absence of urinary retention  Description: INTERVENTIONS:  - Assess patient’s ability to void and empty bladder  - Monitor I/O  - Bladder scan as needed  - Discuss with physician/AP medications to alleviate retention as needed  - Discuss catheterization for long term situations as appropriate  Outcome: Progressing     Problem: METABOLIC, FLUID AND ELECTROLYTES - ADULT  Goal: Electrolytes maintained within normal limits  Description: INTERVENTIONS:  - Monitor labs and assess patient for signs and symptoms of electrolyte imbalances  - Administer electrolyte replacement as ordered  - Monitor response to electrolyte replacements, including repeat lab results as appropriate  - Instruct patient on fluid and nutrition as appropriate  Outcome: Progressing  Goal: Fluid balance maintained  Description: INTERVENTIONS:  - Monitor labs   - Monitor I/O and WT  - Instruct patient on fluid and nutrition as appropriate  - Assess for signs & symptoms of volume excess or deficit  Outcome: Progressing     Problem: SKIN/TISSUE INTEGRITY - ADULT  Goal: Skin Integrity remains intact(Skin Breakdown Prevention)  Description: Assess:  -Perform Leon assessment every -Clean and moisturize skin every   -Inspect skin when repositioning, toileting, and assisting with  ADLS  -Assess under medical devices such as  every   -Assess extremities for adequate circulation and sensation     Bed Management:  -Have minimal linens on bed & keep smooth, unwrinkled  -Change linens as needed when moist or perspiring  -Avoid sitting or lying in one position for more than hours while in bed  -Keep HOB at degrees     Toileting:  -Offer bedside commode  -Assess for incontinence every   -Use incontinent care products after each incontinent episode such as   Activity:  -Mobilize patient  times a day  -Encourage activity and walks on unit  -Encourage or provide ROM exercises   -Turn and reposition patient every  Hours  -Use appropriate equipment to lift or move patient in bed  -Instruct/ Assist with weight shifting every  when out of bed in chair  -Consider limitation of chair time hour intervals    Skin Care:  -Avoid use of baby powder, tape, friction and shearing, hot water or constrictive clothing  -Relieve pressure over bony prominences using   -Do not massage red bony areas    Next Steps:  -Teach patient strategies to minimize risks such as    -Consider consults to  interdisciplinary teams such as   Outcome: Progressing     Problem: MUSCULOSKELETAL - ADULT  Goal: Maintain or return mobility to safest level of function  Description: INTERVENTIONS:  - Assess patient's ability to carry out ADLs; assess patient's baseline for ADL function and identify physical deficits which impact ability to perform ADLs (bathing, care of mouth/teeth, toileting, grooming, dressing, etc.)  - Assess/evaluate cause of self-care deficits   - Assess range of motion  - Assess patient's mobility  - Assess patient's need for assistive devices and provide as appropriate  - Encourage maximum independence but intervene and supervise when necessary  - Involve family in performance of ADLs  - Assess for home care needs following discharge   - Consider OT consult to assist with ADL evaluation and planning for discharge  - Provide  patient education as appropriate  Outcome: Progressing

## 2024-01-16 NOTE — ASSESSMENT & PLAN NOTE
Patient was transferred for [facility] detox unit to [facility] ICU on 1/5/24 for hematemesis  Pt was evaluated by GI team and initially placed on octreotide infusion  CT scan with evidence of varices and portal HTN  Received IV Protonix and Rocephin for SBP prophylaxis (completed 7 days)  Octreotide drip was discontinued  Had associated ABLA and hypotension and was transfused 1 unit PRBC on 1/8/24  Underwent EGD 1/8/2024-normal esophagus, no esophageal or gastric varices, 3 cm hiatal hernia, likely Bill's esophagus, mild portal hypertensive gastropathy and body of stomach, first part of duodenum and second part of duodenum appearing normal.  No old or fresh blood.   No further signs or evidence of bleeding.  Hemoglobin stable at 9.3

## 2024-01-16 NOTE — ASSESSMENT & PLAN NOTE
Results from last 7 days   Lab Units 01/16/24  0559 01/15/24  0805 01/14/24  0517 01/13/24  0810   POTASSIUM mmol/L 4.6 3.6 3.7 3.3*   MAGNESIUM mg/dL 1.5* 1.4* 1.4* 1.3*     Treating for hypomagnesemia and hypokalemia  Potassium stable on 40 mEq twice daily   Monitor intake and output  Complicated by poor vascular access - midline leaking - PICC team to reevaluate  Will switch to oral magnesium supplementation
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
## 2024-01-16 NOTE — ASSESSMENT & PLAN NOTE
Pt has h/o chronic anemia, likely due to anemia of chronic disease with baseline 10-11  Pt developed ABLA in the setting of hematemesis and Hb dropped to 7.2  Was transfused 1u PRBC on 1/8/24 with improvement in Hgb  Underwent EGD without evidence of bleeding  Results from last 7 days   Lab Units 01/16/24  1024 01/15/24  0805 01/14/24  0517 01/13/24  0810   HEMOGLOBIN g/dL 8.5* 9.3* 8.7* 9.3*   No signs or symptoms of bleeding

## 2024-01-16 NOTE — ASSESSMENT & PLAN NOTE
Patient was initially admitted to [facility] on 1/4/24 and was treated with SEWS protocol with phenobarbital  She was transferred to [facility] ICU 1/6/24 for evaluation of GI bleed   She was started on serax but it was further discontinued 1/7 due to somnolence  S/p CIWA protocol  Pt received iv thiamine 500 mg IV q8h x 2 days, then 250 mg IV daily x 5 days  Now on oral thiamine and folic acid  Patient reported she wants to be done drinking.    Most of her drinking stems from anxiety.  Started on Lexapro here.    Continue to encourage HOST

## 2024-01-16 NOTE — ASSESSMENT & PLAN NOTE
Status post PRBC, IVF and IV albumin  Episodes of hypotension related to gi losses from lactulose  Continue IV fluid   BPs appear to be improving

## 2024-01-16 NOTE — ASSESSMENT & PLAN NOTE
Long history of anxiety.  Was on Paxil and Ativan long-term.  Taken off Paxil due to pregnancy, then suffered with postpartum depression was resumed.  Anxiety was not well-controlled and patient reports she turned to drinking to relax and sleep better at night  Started on Lexapro 10 mg daily, patient agreeable  Ativan as needed  Supportive care bedside  Seen and evaluated by podiatry-offered to start low-dose gabapentin-patient declined   Yes

## 2024-01-16 NOTE — ASSESSMENT & PLAN NOTE
Patient was significantly encephalopathic, lethargic with asterixis on exam, now resolved alert and oriented x 3  Received thiamine protocol for concern for Warnicke's  Ammonia level noted to be 178 on admission  Was started on lactulose and Xifaxan  Lactulose dosage decreased   Was started on rifaximin 550 mg BID sent for price check, not covered by insurance will require prior auth. Monitor off can consider starting in outpatient setting   Monitor mentation

## 2024-01-16 NOTE — ASSESSMENT & PLAN NOTE
Likely combination of poor oral intake, low solute intake, excessive free water due to beer potomania

## 2024-01-16 NOTE — PLAN OF CARE
Problem: Prexisting or High Potential for Compromised Skin Integrity  Goal: Skin integrity is maintained or improved  Description: INTERVENTIONS:  - Identify patients at risk for skin breakdown  - Assess and monitor skin integrity  - Assess and monitor nutrition and hydration status  - Monitor labs   - Assess for incontinence   - Turn and reposition patient  - Assist with mobility/ambulation  - Relieve pressure over bony prominences  - Avoid friction and shearing  - Provide appropriate hygiene as needed including keeping skin clean and dry  - Evaluate need for skin moisturizer/barrier cream  - Collaborate with interdisciplinary team   - Patient/family teaching  - Consider wound care consult   Outcome: Progressing     Problem: PAIN - ADULT  Goal: Verbalizes/displays adequate comfort level or baseline comfort level  Description: Interventions:  - Encourage patient to monitor pain and request assistance  - Assess pain using appropriate pain scale  - Administer analgesics based on type and severity of pain and evaluate response  - Implement non-pharmacological measures as appropriate and evaluate response  - Consider cultural and social influences on pain and pain management  - Notify physician/advanced practitioner if interventions unsuccessful or patient reports new pain  Outcome: Progressing     Problem: INFECTION - ADULT  Goal: Absence or prevention of progression during hospitalization  Description: INTERVENTIONS:  - Assess and monitor for signs and symptoms of infection  - Monitor lab/diagnostic results  - Monitor all insertion sites, i.e. indwelling lines, tubes, and drains  - Monitor endotracheal if appropriate and nasal secretions for changes in amount and color  - [place] appropriate cooling/warming therapies per order  - Administer medications as ordered  - Instruct and encourage patient and family to use good hand hygiene technique  - Identify and instruct in appropriate isolation precautions for  identified infection/condition  Outcome: Progressing  Goal: Absence of fever/infection during neutropenic period  Description: INTERVENTIONS:  - Monitor WBC    Outcome: Progressing     Problem: SAFETY ADULT  Goal: Patient will remain free of falls  Description: INTERVENTIONS:  - Educate patient/family on patient safety including physical limitations  - Instruct patient to call for assistance with activity   - Consult OT/PT to assist with strengthening/mobility   - Keep Call bell within reach  - Keep bed low and locked with side rails adjusted as appropriate  - Keep care items and personal belongings within reach  - Initiate and maintain comfort rounds  - Make Fall Risk Sign visible to staff  - Offer Toileting every 2 Hours, in advance of need  - Initiate/Maintain bed alarm  - Apply yellow socks and bracelet for high fall risk patients  - Consider moving patient to room near nurses station  Outcome: Progressing  Goal: Maintain or return to baseline ADL function  Description: INTERVENTIONS:  -  Assess patient's ability to carry out ADLs; assess patient's baseline for ADL function and identify physical deficits which impact ability to perform ADLs (bathing, care of mouth/teeth, toileting, grooming, dressing, etc.)  - Assess/evaluate cause of self-care deficits   - Assess range of motion  - Assess patient's mobility; develop plan if impaired  - Assess patient's need for assistive devices and provide as appropriate  - Encourage maximum independence but intervene and supervise when necessary  - Involve family in performance of ADLs  - Assess for home care needs following discharge   - Consider OT consult to assist with ADL evaluation and planning for discharge  - Provide patient education as appropriate  Outcome: Progressing  Goal: Maintains/Returns to pre admission functional level  Description: INTERVENTIONS:  - Perform AM-PAC 6 Click Basic Mobility/ Daily Activity assessment daily.  - Set and communicate daily mobility  goal to care team and patient/family/caregiver.   - Collaborate with rehabilitation services on mobility goals if consulted  - Out of bed for toileting  - Record patient progress and toleration of activity level   Outcome: Progressing     Problem: DISCHARGE PLANNING  Goal: Discharge to home or other facility with appropriate resources  Description: INTERVENTIONS:  - Identify barriers to discharge w/patient and caregiver  - Arrange for needed discharge resources and transportation as appropriate  - Identify discharge learning needs (meds, wound care, etc.)  - Arrange for interpretive services to assist at discharge as needed  - Refer to Case Management Department for coordinating discharge planning if the patient needs post-hospital services based on physician/advanced practitioner order or complex needs related to functional status, cognitive ability, or social support system  Outcome: Progressing     Problem: Knowledge Deficit  Goal: Patient/family/caregiver demonstrates understanding of disease process, treatment plan, medications, and discharge instructions  Description: Complete learning assessment and assess knowledge base.  Interventions:  - Provide teaching at level of understanding  - Provide teaching via preferred learning methods  Outcome: Progressing     Problem: Nutrition/Hydration-ADULT  Goal: Nutrient/Hydration intake appropriate for improving, restoring or maintaining nutritional needs  Description: Monitor and assess patient's nutrition/hydration status for malnutrition. Collaborate with interdisciplinary team and initiate plan and interventions as ordered.  Monitor patient's weight and dietary intake as ordered or per policy. Utilize nutrition screening tool and intervene as necessary. Determine patient's food preferences and provide high-protein, high-caloric foods as appropriate.     INTERVENTIONS:  - Monitor oral intake, urinary output, labs, and treatment plans  - Assess nutrition and hydration  status and recommend course of action  - Evaluate amount of meals eaten  - Assist patient with eating if necessary   - Allow adequate time for meals  - Recommend/ encourage appropriate diets, oral nutritional supplements, and vitamin/mineral supplements  - Order, calculate, and assess calorie counts as needed  - Recommend, monitor, and adjust tube feedings and TPN/PPN based on assessed needs  - Assess need for intravenous fluids  - Provide specific nutrition/hydration education as appropriate  - Include patient/family/caregiver in decisions related to nutrition  Outcome: Progressing     Problem: NEUROSENSORY - ADULT  Goal: Achieves stable or improved neurological status  Description: INTERVENTIONS  - Monitor and report changes in neurological status  - Monitor vital signs such as temperature, blood pressure, glucose, and any other labs ordered   - Initiate measures to prevent increased intracranial pressure  - Monitor for seizure activity and implement precautions if appropriate      Outcome: Progressing  Goal: Achieves maximal functionality and self care  Description: INTERVENTIONS  - Monitor swallowing and airway patency with patient fatigue and changes in neurological status  - Encourage and assist patient to increase activity and self care.   - Encourage visually impaired, hearing impaired and aphasic patients to use assistive/communication devices  Outcome: Progressing     Problem: GASTROINTESTINAL - ADULT  Goal: Minimal or absence of nausea and/or vomiting  Description: INTERVENTIONS:  - Administer IV fluids if ordered to ensure adequate hydration  - Maintain NPO status until nausea and vomiting are resolved  - Nasogastric tube if ordered  - Administer ordered antiemetic medications as needed  - Provide nonpharmacologic comfort measures as appropriate  - Advance diet as tolerated, if ordered  - Consider nutrition services referral to assist patient with adequate nutrition and appropriate food choices  Outcome:  Progressing  Goal: Maintains adequate nutritional intake  Description: INTERVENTIONS:  - Monitor percentage of each meal consumed  - Identify factors contributing to decreased intake, treat as appropriate  - Assist with meals as needed  - Monitor I&O, weight, and lab values if indicated  - Obtain nutrition services referral as needed  Outcome: Progressing  Goal: Oral mucous membranes remain intact  Description: INTERVENTIONS  - Assess oral mucosa and hygiene practices  - Implement preventative oral hygiene regimen  - Implement oral medicated treatments as ordered  - Initiate Nutrition services referral as needed  Outcome: Progressing     Problem: GENITOURINARY - ADULT  Goal: Absence of urinary retention  Description: INTERVENTIONS:  - Assess patient’s ability to void and empty bladder  - Monitor I/O  - Bladder scan as needed  - Discuss with physician/AP medications to alleviate retention as needed  - Discuss catheterization for long term situations as appropriate  Outcome: Progressing     Problem: METABOLIC, FLUID AND ELECTROLYTES - ADULT  Goal: Electrolytes maintained within normal limits  Description: INTERVENTIONS:  - Monitor labs and assess patient for signs and symptoms of electrolyte imbalances  - Administer electrolyte replacement as ordered  - Monitor response to electrolyte replacements, including repeat lab results as appropriate  - Instruct patient on fluid and nutrition as appropriate  Outcome: Progressing  Goal: Fluid balance maintained  Description: INTERVENTIONS:  - Monitor labs   - Monitor I/O and WT  - Instruct patient on fluid and nutrition as appropriate  - Assess for signs & symptoms of volume excess or deficit  Outcome: Progressing     Problem: SKIN/TISSUE INTEGRITY - ADULT  Goal: Skin Integrity remains intact(Skin Breakdown Prevention)  Description: Assess:  -Perform Leon assessment every 2  Skin Care:  -Avoid use of baby powder, tape, friction and shearing, hot water or constrictive  clothing

## 2024-01-16 NOTE — ASSESSMENT & PLAN NOTE
Urine culture polymicrobial, however corresponding UA without evidence of infection  Positive cx due to bacturia/colonization  UA collected prior to any antibiotics  No UTI sx- Dedicated abx not indicated
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
## 2024-01-16 NOTE — ASSESSMENT & PLAN NOTE
Malnutrition Findings:   Adult Malnutrition type: Chronic illness  Adult Degree of Malnutrition: Other severe protein calorie malnutrition  Malnutrition Characteristics: Muscle loss, Fat loss  360 Statement: severe protein calorie malnutrition related to inadequate protein intake and lack of nutrient-dense foods with excessive ETOH intake as evidenced by  consumption of 8-10 beers daily and hard liquor although she can not quantify how much liquor; severe muscle wasting(temporalis, pectoralis, fat loss (orbital fat pads, triceps), treated with TBD  BMI Findings:  Adult BMI Classifications: Underweight < 18.5  Body mass index is 17.7 kg/m².
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
## 2024-01-17 LAB
ATRIAL RATE: 64 BPM
ATRIAL RATE: 70 BPM
ERYTHROCYTE [DISTWIDTH] IN BLOOD BY AUTOMATED COUNT: 15.1 % (ref 11.6–15.1)
HCT VFR BLD AUTO: 27.2 % (ref 34.8–46.1)
HGB BLD-MCNC: 9.1 G/DL (ref 11.5–15.4)
MCH RBC QN AUTO: 32.7 PG (ref 26.8–34.3)
MCHC RBC AUTO-ENTMCNC: 33.5 G/DL (ref 31.4–37.4)
MCV RBC AUTO: 98 FL (ref 82–98)
P AXIS: 15 DEGREES
P AXIS: 60 DEGREES
PLATELET # BLD AUTO: 358 THOUSANDS/UL (ref 149–390)
PMV BLD AUTO: 10.4 FL (ref 8.9–12.7)
PR INTERVAL: 148 MS
PR INTERVAL: 156 MS
QRS AXIS: 55 DEGREES
QRS AXIS: 71 DEGREES
QRSD INTERVAL: 76 MS
QRSD INTERVAL: 82 MS
QT INTERVAL: 416 MS
QT INTERVAL: 428 MS
QTC INTERVAL: 441 MS
QTC INTERVAL: 449 MS
RBC # BLD AUTO: 2.78 MILLION/UL (ref 3.81–5.12)
T WAVE AXIS: 39 DEGREES
T WAVE AXIS: 42 DEGREES
T4 FREE SERPL-MCNC: 0.99 NG/DL (ref 0.61–1.12)
TSH SERPL DL<=0.05 MIU/L-ACNC: 30.57 UIU/ML (ref 0.45–4.5)
VENTRICULAR RATE: 64 BPM
VENTRICULAR RATE: 70 BPM
WBC # BLD AUTO: 11.88 THOUSAND/UL (ref 4.31–10.16)

## 2024-01-17 PROCEDURE — 93005 ELECTROCARDIOGRAM TRACING: CPT

## 2024-01-17 PROCEDURE — 85027 COMPLETE CBC AUTOMATED: CPT | Performed by: PHYSICIAN ASSISTANT

## 2024-01-17 PROCEDURE — 99232 SBSQ HOSP IP/OBS MODERATE 35: CPT | Performed by: PHYSICIAN ASSISTANT

## 2024-01-17 RX ORDER — ENOXAPARIN SODIUM 100 MG/ML
40 INJECTION SUBCUTANEOUS
Status: DISCONTINUED | OUTPATIENT
Start: 2024-01-17 | End: 2024-01-20 | Stop reason: HOSPADM

## 2024-01-17 RX ORDER — POTASSIUM CHLORIDE 20 MEQ/1
40 TABLET, EXTENDED RELEASE ORAL DAILY
Status: DISCONTINUED | OUTPATIENT
Start: 2024-01-18 | End: 2024-01-19

## 2024-01-17 RX ADMIN — THIAMINE HCL TAB 100 MG 100 MG: 100 TAB at 08:47

## 2024-01-17 RX ADMIN — ENOXAPARIN SODIUM 40 MG: 40 INJECTION SUBCUTANEOUS at 14:05

## 2024-01-17 RX ADMIN — POTASSIUM CHLORIDE 40 MEQ: 1500 TABLET, EXTENDED RELEASE ORAL at 08:45

## 2024-01-17 RX ADMIN — Medication 1 TABLET: at 08:45

## 2024-01-17 RX ADMIN — ESCITALOPRAM OXALATE 10 MG: 10 TABLET ORAL at 08:45

## 2024-01-17 RX ADMIN — FOLIC ACID 1 MG: 1 TABLET ORAL at 08:45

## 2024-01-17 RX ADMIN — PANTOPRAZOLE SODIUM 40 MG: 40 TABLET, DELAYED RELEASE ORAL at 06:33

## 2024-01-17 RX ADMIN — LORAZEPAM 0.5 MG: 0.5 TABLET ORAL at 18:33

## 2024-01-17 NOTE — ASSESSMENT & PLAN NOTE
Malnutrition Findings:   Adult Malnutrition type: Chronic illness  Adult Degree of Malnutrition: Other severe protein calorie malnutrition  Malnutrition Characteristics: Muscle loss, Fat loss  360 Statement: severe protein calorie malnutrition related to inadequate protein intake and lack of nutrient-dense foods with excessive ETOH intake as evidenced by  consumption of 8-10 beers daily and hard liquor although she can not quantify how much liquor; severe muscle wasting(temporalis, pectoralis, fat loss (orbital fat pads, triceps), treated with TBD  BMI Findings:  Adult BMI Classifications: Underweight < 18.5  Body mass index is 17.87 kg/m².

## 2024-01-17 NOTE — ASSESSMENT & PLAN NOTE
Results from last 7 days   Lab Units 01/16/24  0559 01/15/24  0805 01/14/24  0517 01/13/24  0810   POTASSIUM mmol/L 4.6 3.6 3.7 3.3*   MAGNESIUM mg/dL 1.5* 1.4* 1.4* 1.3*   Treating for hypomagnesemia and hypokalemia  Potassium stable on 40 mEq twice daily   Monitor intake and output  Complicated by poor vascular access - midline leaking  Switched to oral magnesium supplementation  Had difficulty obtaining lab work this am- nurse aware and will get this afternoon

## 2024-01-17 NOTE — ASSESSMENT & PLAN NOTE
Long history of anxiety.  Was on Paxil and Ativan long-term.  Taken off Paxil due to pregnancy, then suffered with postpartum depression was resumed.  Anxiety was not well-controlled and patient reports she turned to drinking to relax and sleep better at night  Started on Lexapro 10 mg daily, patient agreeable  Ativan as needed  Supportive care bedside  Seen and evaluated by podiatry-offered to start low-dose gabapentin-patient declined

## 2024-01-17 NOTE — ASSESSMENT & PLAN NOTE
"Pt has h/o hepatic steatosis, likley due to alcohol abuse  RUQ US with \"severe hepatic steatosis.  Coexisting fibrotic or inflammatory changes not excluded\"  Suspect developing cirrhosis due to   Varices and portal HTN on CT  Coagulopathy: INR approx 1.2--1.4  Thrombocytopenia  Hepatic encephalopathy/hyperammonemia  Cont current management, alcohol cessation, and rehab referrals  Per GI: should have outpt elastography  "

## 2024-01-17 NOTE — ASSESSMENT & PLAN NOTE
Patient was initially admitted to [facility] on 1/4/24 and was treated with SEWS protocol with phenobarbital  She was transferred to [facility] ICU 1/6/24 for evaluation of GI bleed   She was started on serax but it was further discontinued 1/7 due to somnolence  S/p CIWA protocol  Pt received iv thiamine 500 mg IV q8h x 2 days, then 250 mg IV daily x 5 days  Now on oral thiamine and folic acid  Patient reported she wants to be done drinking.    Most of her drinking stems from anxiety.  Started on Lexapro here.    Continue to encourage HOST- now agreeable to inpatient alcohol rehab- CM aware

## 2024-01-17 NOTE — ASSESSMENT & PLAN NOTE
Results from last 7 days   Lab Units 01/16/24  1024 01/15/24  0805 01/14/24  0517 01/13/24  0810   HEMOGLOBIN g/dL 8.5* 9.3* 8.7* 9.3*   Pt has h/o chronic anemia, likely due to anemia of chronic disease with baseline 10-11  Pt developed ABLA in the setting of hematemesis and Hb dropped to 7.2  Was transfused 1u PRBC on 1/8/24 with improvement in Hgb  Underwent EGD 1/9/24 without evidence of bleeding  No signs or symptoms of bleeding
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
## 2024-01-17 NOTE — ASSESSMENT & PLAN NOTE
Status post PRBC, IVF and IV albumin  Episodes of hypotension possibly related to gi losses from lactulose  Complicated by poor access and unable to be properly fluid resuscitated   Ongoing workup obtained - Check TSH, orthostatic vitals  Lying 98/67, sitting 103/74, standing 101/74  TSH elevated at 30 - free t4 pending at this time
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
## 2024-01-17 NOTE — ASSESSMENT & PLAN NOTE
Patient was significantly encephalopathic, lethargic with asterixis on exam, now resolved alert and oriented x 3  Received thiamine protocol for concern for Warnicke's  Ammonia level noted to be 178 on admission  Was started on lactulose and Xifaxan  Lactulose dosage decreased and now made prn  Was started on rifaximin 550 mg BID sent for price check, not covered by insurance will require prior auth.   Monitor off rifaxamin can consider starting in outpatient setting if clinically warrented  Encephalopathy now resolved - Monitor mentation- conversing and is alert and oriented x 3

## 2024-01-17 NOTE — ASSESSMENT & PLAN NOTE
Pt presented to [facility] Detox unit with acute alcoholic hepatitis  Initial  and T bili 4.7  Pt was tx with supportive measures:  discriminant function <32 and steroids not indicated  Discriminant function:  5--> steroids again not indicated  AST and bilirubin now decreased

## 2024-01-17 NOTE — PLAN OF CARE
Problem: Prexisting or High Potential for Compromised Skin Integrity  Goal: Skin integrity is maintained or improved  Description: INTERVENTIONS:  - Identify patients at risk for skin breakdown  - Assess and monitor skin integrity  - Assess and monitor nutrition and hydration status  - Monitor labs   - Assess for incontinence   - Turn and reposition patient  - Assist with mobility/ambulation  - Relieve pressure over bony prominences  - Avoid friction and shearing  - Provide appropriate hygiene as needed including keeping skin clean and dry  - Evaluate need for skin moisturizer/barrier cream  - Collaborate with interdisciplinary team   - Patient/family teaching  - Consider wound care consult   Outcome: Progressing     Problem: PAIN - ADULT  Goal: Verbalizes/displays adequate comfort level or baseline comfort level  Description: Interventions:  - Encourage patient to monitor pain and request assistance  - Assess pain using appropriate pain scale  - Administer analgesics based on type and severity of pain and evaluate response  - Implement non-pharmacological measures as appropriate and evaluate response  - Consider cultural and social influences on pain and pain management  - Notify physician/advanced practitioner if interventions unsuccessful or patient reports new pain  Outcome: Progressing     Problem: INFECTION - ADULT  Goal: Absence or prevention of progression during hospitalization  Description: INTERVENTIONS:  - Assess and monitor for signs and symptoms of infection  - Monitor lab/diagnostic results  - Monitor all insertion sites, i.e. indwelling lines, tubes, and drains  - Monitor endotracheal if appropriate and nasal secretions for changes in amount and color  - [place] appropriate cooling/warming therapies per order  - Administer medications as ordered  - Instruct and encourage patient and family to use good hand hygiene technique  - Identify and instruct in appropriate isolation precautions for  identified infection/condition  Outcome: Progressing  Goal: Absence of fever/infection during neutropenic period  Description: INTERVENTIONS:  - Monitor WBC    Outcome: Progressing     Problem: SAFETY ADULT  Goal: Patient will remain free of falls  Description: INTERVENTIONS:  - Educate patient/family on patient safety including physical limitations  - Instruct patient to call for assistance with activity   - Consult OT/PT to assist with strengthening/mobility   - Keep Call bell within reach  - Keep bed low and locked with side rails adjusted as appropriate  - Keep care items and personal belongings within reach  - Initiate and maintain comfort rounds  - Make Fall Risk Sign visible to staff  - Offer Toileting every 2 Hours, in advance of need  - Initiate/Maintain bed alarm  - Apply yellow socks and bracelet for high fall risk patients  - Consider moving patient to room near nurses station  Outcome: Progressing  Goal: Maintain or return to baseline ADL function  Description: INTERVENTIONS:  -  Assess patient's ability to carry out ADLs; assess patient's baseline for ADL function and identify physical deficits which impact ability to perform ADLs (bathing, care of mouth/teeth, toileting, grooming, dressing, etc.)  - Assess/evaluate cause of self-care deficits   - Assess range of motion  - Assess patient's mobility; develop plan if impaired  - Assess patient's need for assistive devices and provide as appropriate  - Encourage maximum independence but intervene and supervise when necessary  - Involve family in performance of ADLs  - Assess for home care needs following discharge   - Consider OT consult to assist with ADL evaluation and planning for discharge  - Provide patient education as appropriate  Outcome: Progressing  Goal: Maintains/Returns to pre admission functional level  Description: INTERVENTIONS:  - Perform AM-PAC 6 Click Basic Mobility/ Daily Activity assessment daily.  - Set and communicate daily mobility  goal to care team and patient/family/caregiver.   - Collaborate with rehabilitation services on mobility goals if consulted  - Out of bed for toileting  - Record patient progress and toleration of activity level   Outcome: Progressing     Problem: DISCHARGE PLANNING  Goal: Discharge to home or other facility with appropriate resources  Description: INTERVENTIONS:  - Identify barriers to discharge w/patient and caregiver  - Arrange for needed discharge resources and transportation as appropriate  - Identify discharge learning needs (meds, wound care, etc.)  - Arrange for interpretive services to assist at discharge as needed  - Refer to Case Management Department for coordinating discharge planning if the patient needs post-hospital services based on physician/advanced practitioner order or complex needs related to functional status, cognitive ability, or social support system  Outcome: Progressing     Problem: Knowledge Deficit  Goal: Patient/family/caregiver demonstrates understanding of disease process, treatment plan, medications, and discharge instructions  Description: Complete learning assessment and assess knowledge base.  Interventions:  - Provide teaching at level of understanding  - Provide teaching via preferred learning methods  Outcome: Progressing     Problem: Nutrition/Hydration-ADULT  Goal: Nutrient/Hydration intake appropriate for improving, restoring or maintaining nutritional needs  Description: Monitor and assess patient's nutrition/hydration status for malnutrition. Collaborate with interdisciplinary team and initiate plan and interventions as ordered.  Monitor patient's weight and dietary intake as ordered or per policy. Utilize nutrition screening tool and intervene as necessary. Determine patient's food preferences and provide high-protein, high-caloric foods as appropriate.     INTERVENTIONS:  - Monitor oral intake, urinary output, labs, and treatment plans  - Assess nutrition and hydration  status and recommend course of action  - Evaluate amount of meals eaten  - Assist patient with eating if necessary   - Allow adequate time for meals  - Recommend/ encourage appropriate diets, oral nutritional supplements, and vitamin/mineral supplements  - Order, calculate, and assess calorie counts as needed  - Recommend, monitor, and adjust tube feedings and TPN/PPN based on assessed needs  - Assess need for intravenous fluids  - Provide specific nutrition/hydration education as appropriate  - Include patient/family/caregiver in decisions related to nutrition  Outcome: Progressing     Problem: NEUROSENSORY - ADULT  Goal: Achieves stable or improved neurological status  Description: INTERVENTIONS  - Monitor and report changes in neurological status  - Monitor vital signs such as temperature, blood pressure, glucose, and any other labs ordered   - Initiate measures to prevent increased intracranial pressure  - Monitor for seizure activity and implement precautions if appropriate      Outcome: Progressing  Goal: Achieves maximal functionality and self care  Description: INTERVENTIONS  - Monitor swallowing and airway patency with patient fatigue and changes in neurological status  - Encourage and assist patient to increase activity and self care.   - Encourage visually impaired, hearing impaired and aphasic patients to use assistive/communication devices  Outcome: Progressing     Problem: GASTROINTESTINAL - ADULT  Goal: Minimal or absence of nausea and/or vomiting  Description: INTERVENTIONS:  - Administer IV fluids if ordered to ensure adequate hydration  - Maintain NPO status until nausea and vomiting are resolved  - Nasogastric tube if ordered  - Administer ordered antiemetic medications as needed  - Provide nonpharmacologic comfort measures as appropriate  - Advance diet as tolerated, if ordered  - Consider nutrition services referral to assist patient with adequate nutrition and appropriate food choices  Outcome:  Progressing  Goal: Maintains adequate nutritional intake  Description: INTERVENTIONS:  - Monitor percentage of each meal consumed  - Identify factors contributing to decreased intake, treat as appropriate  - Assist with meals as needed  - Monitor I&O, weight, and lab values if indicated  - Obtain nutrition services referral as needed  Outcome: Progressing  Goal: Oral mucous membranes remain intact  Description: INTERVENTIONS  - Assess oral mucosa and hygiene practices  - Implement preventative oral hygiene regimen  - Implement oral medicated treatments as ordered  - Initiate Nutrition services referral as needed  Outcome: Progressing     Problem: GENITOURINARY - ADULT  Goal: Absence of urinary retention  Description: INTERVENTIONS:  - Assess patient’s ability to void and empty bladder  - Monitor I/O  - Bladder scan as needed  - Discuss with physician/AP medications to alleviate retention as needed  - Discuss catheterization for long term situations as appropriate  Outcome: Progressing     Problem: METABOLIC, FLUID AND ELECTROLYTES - ADULT  Goal: Electrolytes maintained within normal limits  Description: INTERVENTIONS:  - Monitor labs and assess patient for signs and symptoms of electrolyte imbalances  - Administer electrolyte replacement as ordered  - Monitor response to electrolyte replacements, including repeat lab results as appropriate  - Instruct patient on fluid and nutrition as appropriate  Outcome: Progressing  Goal: Fluid balance maintained  Description: INTERVENTIONS:  - Monitor labs   - Monitor I/O and WT  - Instruct patient on fluid and nutrition as appropriate  - Assess for signs & symptoms of volume excess or deficit  Outcome: Progressing     Problem: SKIN/TISSUE INTEGRITY - ADULT  Goal: Skin Integrity remains intact(Skin Breakdown Prevention)  Description: Assess:       Problem: MUSCULOSKELETAL - ADULT  Goal: Maintain or return mobility to safest level of function  Description: INTERVENTIONS:  -  Assess patient's ability to carry out ADLs; assess patient's baseline for ADL function and identify physical deficits which impact ability to perform ADLs (bathing, care of mouth/teeth, toileting, grooming, dressing, etc.)  - Assess/evaluate cause of self-care deficits   - Assess range of motion  - Assess patient's mobility  - Assess patient's need for assistive devices and provide as appropriate  - Encourage maximum independence but intervene and supervise when necessary  - Involve family in performance of ADLs  - Assess for home care needs following discharge   - Consider OT consult to assist with ADL evaluation and planning for discharge  - Provide patient education as appropriate  Outcome: Progressing

## 2024-01-17 NOTE — ASSESSMENT & PLAN NOTE
Patient was transferred for [facility] detox unit to [facility] ICU on 1/5/24 for hematemesis  Pt was evaluated by GI team and initially placed on octreotide infusion  CT scan with evidence of varices and portal HTN  Received IV Protonix and Rocephin for SBP prophylaxis (completed 7 days)  Octreotide drip was discontinued  Had associated ABLA and hypotension and was transfused 1 unit PRBC on 1/8/24  Underwent EGD 1/8/2024-normal esophagus, no esophageal or gastric varices, 3 cm hiatal hernia, likely Bill's esophagus, mild portal hypertensive gastropathy and body of stomach, first part of duodenum and second part of duodenum appearing normal.  No old or fresh blood.   No further signs or evidence of bleeding.  Hemoglobin stable between 8 and 9

## 2024-01-18 PROBLEM — E03.8 SUBCLINICAL HYPOTHYROIDISM: Status: ACTIVE | Noted: 2024-01-18

## 2024-01-18 LAB
ALBUMIN SERPL BCP-MCNC: 3.1 G/DL (ref 3.5–5)
ALP SERPL-CCNC: 120 U/L (ref 34–104)
ALT SERPL W P-5'-P-CCNC: 18 U/L (ref 7–52)
ANION GAP SERPL CALCULATED.3IONS-SCNC: 6 MMOL/L
AST SERPL W P-5'-P-CCNC: 75 U/L (ref 13–39)
BILIRUB SERPL-MCNC: 1.92 MG/DL (ref 0.2–1)
BUN SERPL-MCNC: 5 MG/DL (ref 5–25)
CALCIUM ALBUM COR SERPL-MCNC: 9.2 MG/DL (ref 8.3–10.1)
CALCIUM SERPL-MCNC: 8.5 MG/DL (ref 8.4–10.2)
CHLORIDE SERPL-SCNC: 110 MMOL/L (ref 96–108)
CO2 SERPL-SCNC: 18 MMOL/L (ref 21–32)
CREAT SERPL-MCNC: 0.35 MG/DL (ref 0.6–1.3)
ERYTHROCYTE [DISTWIDTH] IN BLOOD BY AUTOMATED COUNT: 15.6 % (ref 11.6–15.1)
GFR SERPL CREATININE-BSD FRML MDRD: 130 ML/MIN/1.73SQ M
GLUCOSE SERPL-MCNC: 85 MG/DL (ref 65–140)
HCT VFR BLD AUTO: 26.6 % (ref 34.8–46.1)
HGB BLD-MCNC: 8.3 G/DL (ref 11.5–15.4)
MAGNESIUM SERPL-MCNC: 1.3 MG/DL (ref 1.9–2.7)
MCH RBC QN AUTO: 31.9 PG (ref 26.8–34.3)
MCHC RBC AUTO-ENTMCNC: 31.2 G/DL (ref 31.4–37.4)
MCV RBC AUTO: 102 FL (ref 82–98)
PLATELET # BLD AUTO: 378 THOUSANDS/UL (ref 149–390)
PMV BLD AUTO: 10.6 FL (ref 8.9–12.7)
POTASSIUM SERPL-SCNC: 3.5 MMOL/L (ref 3.5–5.3)
PROT SERPL-MCNC: 5.6 G/DL (ref 6.4–8.4)
RBC # BLD AUTO: 2.6 MILLION/UL (ref 3.81–5.12)
SODIUM SERPL-SCNC: 134 MMOL/L (ref 135–147)
WBC # BLD AUTO: 10.3 THOUSAND/UL (ref 4.31–10.16)

## 2024-01-18 PROCEDURE — 83735 ASSAY OF MAGNESIUM: CPT | Performed by: PHYSICIAN ASSISTANT

## 2024-01-18 PROCEDURE — 97530 THERAPEUTIC ACTIVITIES: CPT

## 2024-01-18 PROCEDURE — 80053 COMPREHEN METABOLIC PANEL: CPT | Performed by: PHYSICIAN ASSISTANT

## 2024-01-18 PROCEDURE — 99232 SBSQ HOSP IP/OBS MODERATE 35: CPT | Performed by: PHYSICIAN ASSISTANT

## 2024-01-18 PROCEDURE — 85027 COMPLETE CBC AUTOMATED: CPT | Performed by: PHYSICIAN ASSISTANT

## 2024-01-18 PROCEDURE — 97116 GAIT TRAINING THERAPY: CPT

## 2024-01-18 PROCEDURE — 97535 SELF CARE MNGMENT TRAINING: CPT

## 2024-01-18 RX ORDER — SACCHAROMYCES BOULARDII 250 MG
250 CAPSULE ORAL 2 TIMES DAILY
Status: DISCONTINUED | OUTPATIENT
Start: 2024-01-18 | End: 2024-01-20 | Stop reason: HOSPADM

## 2024-01-18 RX ORDER — SODIUM CHLORIDE, SODIUM GLUCONATE, SODIUM ACETATE, POTASSIUM CHLORIDE, MAGNESIUM CHLORIDE, SODIUM PHOSPHATE, DIBASIC, AND POTASSIUM PHOSPHATE .53; .5; .37; .037; .03; .012; .00082 G/100ML; G/100ML; G/100ML; G/100ML; G/100ML; G/100ML; G/100ML
100 INJECTION, SOLUTION INTRAVENOUS CONTINUOUS
Status: DISCONTINUED | OUTPATIENT
Start: 2024-01-18 | End: 2024-01-20 | Stop reason: HOSPADM

## 2024-01-18 RX ORDER — MAGNESIUM SULFATE HEPTAHYDRATE 40 MG/ML
2 INJECTION, SOLUTION INTRAVENOUS 2 TIMES DAILY
Status: COMPLETED | OUTPATIENT
Start: 2024-01-18 | End: 2024-01-19

## 2024-01-18 RX ADMIN — POTASSIUM CHLORIDE 40 MEQ: 1500 TABLET, EXTENDED RELEASE ORAL at 09:34

## 2024-01-18 RX ADMIN — Medication 14 MG: at 09:34

## 2024-01-18 RX ADMIN — FOLIC ACID 1 MG: 1 TABLET ORAL at 09:34

## 2024-01-18 RX ADMIN — LORAZEPAM 0.5 MG: 0.5 TABLET ORAL at 17:53

## 2024-01-18 RX ADMIN — CHLORHEXIDINE GLUCONATE 0.12% ORAL RINSE 15 ML: 1.2 LIQUID ORAL at 09:33

## 2024-01-18 RX ADMIN — THIAMINE HCL TAB 100 MG 100 MG: 100 TAB at 09:34

## 2024-01-18 RX ADMIN — PANTOPRAZOLE SODIUM 40 MG: 40 TABLET, DELAYED RELEASE ORAL at 06:27

## 2024-01-18 RX ADMIN — Medication 1 PACKET: at 14:26

## 2024-01-18 RX ADMIN — ENOXAPARIN SODIUM 40 MG: 40 INJECTION SUBCUTANEOUS at 09:34

## 2024-01-18 RX ADMIN — MAGNESIUM SULFATE HEPTAHYDRATE 2 G: 40 INJECTION, SOLUTION INTRAVENOUS at 22:00

## 2024-01-18 RX ADMIN — ESCITALOPRAM OXALATE 10 MG: 10 TABLET ORAL at 09:34

## 2024-01-18 RX ADMIN — Medication 1 TABLET: at 09:34

## 2024-01-18 RX ADMIN — SODIUM CHLORIDE, SODIUM GLUCONATE, SODIUM ACETATE, POTASSIUM CHLORIDE, MAGNESIUM CHLORIDE, SODIUM PHOSPHATE, DIBASIC, AND POTASSIUM PHOSPHATE 100 ML/HR: .53; .5; .37; .037; .03; .012; .00082 INJECTION, SOLUTION INTRAVENOUS at 14:26

## 2024-01-18 RX ADMIN — Medication 250 MG: at 14:26

## 2024-01-18 RX ADMIN — MAGNESIUM SULFATE HEPTAHYDRATE 2 G: 40 INJECTION, SOLUTION INTRAVENOUS at 09:33

## 2024-01-18 NOTE — PLAN OF CARE
Problem: Prexisting or High Potential for Compromised Skin Integrity  Goal: Skin integrity is maintained or improved  Description: INTERVENTIONS:  - Identify patients at risk for skin breakdown  - Assess and monitor skin integrity  - Assess and monitor nutrition and hydration status  - Monitor labs   - Assess for incontinence   - Turn and reposition patient  - Assist with mobility/ambulation  - Relieve pressure over bony prominences  - Avoid friction and shearing  - Provide appropriate hygiene as needed including keeping skin clean and dry  - Evaluate need for skin moisturizer/barrier cream  - Collaborate with interdisciplinary team   - Patient/family teaching  - Consider wound care consult   Outcome: Progressing     Problem: PAIN - ADULT  Goal: Verbalizes/displays adequate comfort level or baseline comfort level  Description: Interventions:  - Encourage patient to monitor pain and request assistance  - Assess pain using appropriate pain scale  - Administer analgesics based on type and severity of pain and evaluate response  - Implement non-pharmacological measures as appropriate and evaluate response  - Consider cultural and social influences on pain and pain management  - Notify physician/advanced practitioner if interventions unsuccessful or patient reports new pain  Outcome: Progressing     Problem: INFECTION - ADULT  Goal: Absence or prevention of progression during hospitalization  Description: INTERVENTIONS:  - Assess and monitor for signs and symptoms of infection  - Monitor lab/diagnostic results  - Monitor all insertion sites, i.e. indwelling lines, tubes, and drains  - Monitor endotracheal if appropriate and nasal secretions for changes in amount and color  - [place] appropriate cooling/warming therapies per order  - Administer medications as ordered  - Instruct and encourage patient and family to use good hand hygiene technique  - Identify and instruct in appropriate isolation precautions for  identified infection/condition  Outcome: Progressing  Goal: Absence of fever/infection during neutropenic period  Description: INTERVENTIONS:  - Monitor WBC    Outcome: Progressing     Problem: SAFETY ADULT  Goal: Patient will remain free of falls  Description: INTERVENTIONS:  - Educate patient/family on patient safety including physical limitations  - Instruct patient to call for assistance with activity   - Consult OT/PT to assist with strengthening/mobility   - Keep Call bell within reach  - Keep bed low and locked with side rails adjusted as appropriate  - Keep care items and personal belongings within reach  - Initiate and maintain comfort rounds  - Make Fall Risk Sign visible to staff  - Offer Toileting every 2 Hours, in advance of need  - Initiate/Maintain bed alarm  - Apply yellow socks and bracelet for high fall risk patients  - Consider moving patient to room near nurses station  Outcome: Progressing  Goal: Maintain or return to baseline ADL function  Description: INTERVENTIONS:  -  Assess patient's ability to carry out ADLs; assess patient's baseline for ADL function and identify physical deficits which impact ability to perform ADLs (bathing, care of mouth/teeth, toileting, grooming, dressing, etc.)  - Assess/evaluate cause of self-care deficits   - Assess range of motion  - Assess patient's mobility; develop plan if impaired  - Assess patient's need for assistive devices and provide as appropriate  - Encourage maximum independence but intervene and supervise when necessary  - Involve family in performance of ADLs  - Assess for home care needs following discharge   - Consider OT consult to assist with ADL evaluation and planning for discharge  - Provide patient education as appropriate  Outcome: Progressing  Goal: Maintains/Returns to pre admission functional level  Description: INTERVENTIONS:  - Perform AM-PAC 6 Click Basic Mobility/ Daily Activity assessment daily.  - Set and communicate daily mobility  goal to care team and patient/family/caregiver.   - Collaborate with rehabilitation services on mobility goals if consulted  - Out of bed for toileting  - Record patient progress and toleration of activity level   Outcome: Progressing     Problem: DISCHARGE PLANNING  Goal: Discharge to home or other facility with appropriate resources  Description: INTERVENTIONS:  - Identify barriers to discharge w/patient and caregiver  - Arrange for needed discharge resources and transportation as appropriate  - Identify discharge learning needs (meds, wound care, etc.)  - Arrange for interpretive services to assist at discharge as needed  - Refer to Case Management Department for coordinating discharge planning if the patient needs post-hospital services based on physician/advanced practitioner order or complex needs related to functional status, cognitive ability, or social support system  Outcome: Progressing     Problem: Knowledge Deficit  Goal: Patient/family/caregiver demonstrates understanding of disease process, treatment plan, medications, and discharge instructions  Description: Complete learning assessment and assess knowledge base.  Interventions:  - Provide teaching at level of understanding  - Provide teaching via preferred learning methods  Outcome: Progressing     Problem: Nutrition/Hydration-ADULT  Goal: Nutrient/Hydration intake appropriate for improving, restoring or maintaining nutritional needs  Description: Monitor and assess patient's nutrition/hydration status for malnutrition. Collaborate with interdisciplinary team and initiate plan and interventions as ordered.  Monitor patient's weight and dietary intake as ordered or per policy. Utilize nutrition screening tool and intervene as necessary. Determine patient's food preferences and provide high-protein, high-caloric foods as appropriate.     INTERVENTIONS:  - Monitor oral intake, urinary output, labs, and treatment plans  - Assess nutrition and hydration  status and recommend course of action  - Evaluate amount of meals eaten  - Assist patient with eating if necessary   - Allow adequate time for meals  - Recommend/ encourage appropriate diets, oral nutritional supplements, and vitamin/mineral supplements  - Order, calculate, and assess calorie counts as needed  - Recommend, monitor, and adjust tube feedings and TPN/PPN based on assessed needs  - Assess need for intravenous fluids  - Provide specific nutrition/hydration education as appropriate  - Include patient/family/caregiver in decisions related to nutrition  Outcome: Progressing     Problem: NEUROSENSORY - ADULT  Goal: Achieves stable or improved neurological status  Description: INTERVENTIONS  - Monitor and report changes in neurological status  - Monitor vital signs such as temperature, blood pressure, glucose, and any other labs ordered   - Initiate measures to prevent increased intracranial pressure  - Monitor for seizure activity and implement precautions if appropriate      Outcome: Progressing  Goal: Achieves maximal functionality and self care  Description: INTERVENTIONS  - Monitor swallowing and airway patency with patient fatigue and changes in neurological status  - Encourage and assist patient to increase activity and self care.   - Encourage visually impaired, hearing impaired and aphasic patients to use assistive/communication devices  Outcome: Progressing     Problem: GASTROINTESTINAL - ADULT  Goal: Minimal or absence of nausea and/or vomiting  Description: INTERVENTIONS:  - Administer IV fluids if ordered to ensure adequate hydration  - Maintain NPO status until nausea and vomiting are resolved  - Nasogastric tube if ordered  - Administer ordered antiemetic medications as needed  - Provide nonpharmacologic comfort measures as appropriate  - Advance diet as tolerated, if ordered  - Consider nutrition services referral to assist patient with adequate nutrition and appropriate food choices  Outcome:  Progressing  Goal: Maintains adequate nutritional intake  Description: INTERVENTIONS:  - Monitor percentage of each meal consumed  - Identify factors contributing to decreased intake, treat as appropriate  - Assist with meals as needed  - Monitor I&O, weight, and lab values if indicated  - Obtain nutrition services referral as needed  Outcome: Progressing  Goal: Oral mucous membranes remain intact  Description: INTERVENTIONS  - Assess oral mucosa and hygiene practices  - Implement preventative oral hygiene regimen  - Implement oral medicated treatments as ordered  - Initiate Nutrition services referral as needed  Outcome: Progressing     Problem: GENITOURINARY - ADULT  Goal: Absence of urinary retention  Description: INTERVENTIONS:  - Assess patient’s ability to void and empty bladder  - Monitor I/O  - Bladder scan as needed  - Discuss with physician/AP medications to alleviate retention as needed  - Discuss catheterization for long term situations as appropriate  Outcome: Progressing     Problem: METABOLIC, FLUID AND ELECTROLYTES - ADULT  Goal: Electrolytes maintained within normal limits  Description: INTERVENTIONS:  - Monitor labs and assess patient for signs and symptoms of electrolyte imbalances  - Administer electrolyte replacement as ordered  - Monitor response to electrolyte replacements, including repeat lab results as appropriate  - Instruct patient on fluid and nutrition as appropriate  Outcome: Progressing  Goal: Fluid balance maintained  Description: INTERVENTIONS:  - Monitor labs   - Monitor I/O and WT  - Instruct patient on fluid and nutrition as appropriate  - Assess for signs & symptoms of volume excess or deficit  Outcome: Progressing     Problem: SKIN/TISSUE INTEGRITY - ADULT  Goal: Skin Integrity remains intact(Skin Breakdown Prevention)  Description: Assess:  -Perform Leon assessment every shift  -Clean and moisturize skin every shift  -Inspect skin when repositioning, toileting, and assisting  with ADLS  -Assess extremities for adequate circulation and sensation     Bed Management:  -Have minimal linens on bed & keep smooth, unwrinkled  -Change linens as needed when moist or perspiring  -Avoid sitting or lying in one position for more than 2 hours while in bed  -Keep HOB at 30 degrees     Toileting:  -Offer bedside commode  -Assess for incontinence every shift    Activity:  -Mobilize patient 4 times a day  -Encourage activity and walks on unit  -Encourage or provide ROM exercises   -Turn and reposition patient every 2 Hours  -Use appropriate equipment to lift or move patient in bed    Skin Care:  -Avoid use of baby powder, tape, friction and shearing, hot water or constrictive clothing  -Do not massage red bony areas    Problem: MUSCULOSKELETAL - ADULT  Goal: Maintain or return mobility to safest level of function  Description: INTERVENTIONS:  - Assess patient's ability to carry out ADLs; assess patient's baseline for ADL function and identify physical deficits which impact ability to perform ADLs (bathing, care of mouth/teeth, toileting, grooming, dressing, etc.)  - Assess/evaluate cause of self-care deficits   - Assess range of motion  - Assess patient's mobility  - Assess patient's need for assistive devices and provide as appropriate  - Encourage maximum independence but intervene and supervise when necessary  - Involve family in performance of ADLs  - Assess for home care needs following discharge   - Consider OT consult to assist with ADL evaluation and planning for discharge  - Provide patient education as appropriate  Outcome: Progressing

## 2024-01-18 NOTE — ASSESSMENT & PLAN NOTE
Long history of anxiety.  Was on Paxil and Ativan long-term.    Taken off Paxil due to pregnancy, then suffered with postpartum depression was resumed.    Anxiety was not well-controlled and patient reports she turned to drinking to relax and sleep better at night  Started on Lexapro 10 mg daily, patient agreeable  Ativan as needed  Supportive care  Seen and evaluated by psychiatry-offered to start low-dose gabapentin-patient declined

## 2024-01-18 NOTE — ASSESSMENT & PLAN NOTE
Malnutrition Findings:   Adult Malnutrition type: Chronic illness  Adult Degree of Malnutrition: Other severe protein calorie malnutrition  Malnutrition Characteristics: Muscle loss, Fat loss  360 Statement: severe protein calorie malnutrition related to inadequate protein intake and lack of nutrient-dense foods with excessive ETOH intake as evidenced by  consumption of 8-10 beers daily and hard liquor although she can not quantify how much liquor; severe muscle wasting(temporalis, pectoralis, fat loss (orbital fat pads, triceps), treated with TBD  BMI Findings:  Adult BMI Classifications: Underweight < 18.5  Body mass index is 18.02 kg/m².

## 2024-01-18 NOTE — ASSESSMENT & PLAN NOTE
Status post PRBC, IVF and IV albumin  Episodes of hypotension possibly related to GI losses   Complicated by poor access and unable to be properly fluid resuscitated   Ongoing workup obtained - Checked TSH, orthostatic vitals  Lying 98/67, sitting 103/74, standing 101/74  TSH elevated at 30 - free t4 within normal limits at 0.99  Ongoing treatment with IV fluid

## 2024-01-18 NOTE — ASSESSMENT & PLAN NOTE
TSH elevated at 30.570  T4 within normal range at 0.99  Advised importance of followup with repeat thyroid function studies in 4-6 weeks time

## 2024-01-18 NOTE — ASSESSMENT & PLAN NOTE
Results from last 7 days   Lab Units 01/18/24  0514 01/17/24  1533 01/16/24  1024 01/15/24  0805   HEMOGLOBIN g/dL 8.3* 9.1* 8.5* 9.3*   Pt has h/o chronic anemia, likely due to anemia of chronic disease with baseline 10-11  Pt developed ABLA in the setting of hematemesis and Hb dropped to 7.2  Was transfused 1u PRBC on 1/8/24 with improvement in Hgb  Underwent EGD 1/9/24 without evidence of bleeding  No signs or symptoms of ongoing bleeding and octreotide was stopped

## 2024-01-18 NOTE — ASSESSMENT & PLAN NOTE
Patient was initially admitted to [place] detox on 1/4/24 and was treated with SEWS protocol with phenobarbital  She was transferred to [facility] ICU 1/6/24 for evaluation of GI bleed   She was started on serax but it was further discontinued 1/7 due to somnolence  S/p CIWA protocol  Pt received iv thiamine 500 mg IV q8h x 2 days, then 250 mg IV daily x 5 days  Now on oral thiamine and folic acid  Patient reported she wants to be done drinking.  Most of her drinking stems from anxiety. Started on Lexapro here  Underwent evaluation HOST- now agreeable to inpatient alcohol rehab- CM aware

## 2024-01-18 NOTE — PLAN OF CARE
Problem: PHYSICAL THERAPY ADULT  Goal: Performs mobility at highest level of function for planned discharge setting.  See evaluation for individualized goals.  Description: Treatment/Interventions: LE strengthening/ROM, Elevations, Therapeutic exercise, Endurance training, Bed mobility, Gait training, Spoke to nursing, OT  Equipment Recommended: Walker (pt does not own)       See flowsheet documentation for full assessment, interventions and recommendations.  Outcome: Progressing  Note: Prognosis: Good  Problem List: Decreased mobility  Assessment: Pt. progressing well with her mobility and needed no hands on A for any mobility. pt. has achieved all her goals set in IE hence pt. is being DC from PT services at this time.  Barriers to Discharge: None     Rehab Resource Intensity Level, PT: No post-acute rehabilitation needs    See flowsheet documentation for full assessment.
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
## 2024-01-18 NOTE — ASSESSMENT & PLAN NOTE
Patient was transferred from [facility] detox unit to [facility] ICU on 1/5/24 for hematemesis  Pt was evaluated by GI team and initially placed on octreotide infusion  CT scan with evidence of varices and portal HTN  Continues on IV Protonix   S/p Rocephin for SBP prophylaxis-completed 7 days  S/p Octreotide drip   Had associated ABLA and hypotension and was transfused 1 unit PRBC on 1/8/24  Underwent EGD 1/8/2024-normal esophagus, no esophageal or gastric varices, 3 cm hiatal hernia, likely Bill's esophagus, mild portal hypertensive gastropathy and body of stomach, first part of duodenum and second part of duodenum appearing normal.  No old or fresh blood.   No further signs or evidence of bleeding.  Hemoglobin stable between 8 and 9

## 2024-01-18 NOTE — ASSESSMENT & PLAN NOTE
"Pt has history of hepatic steatosis, likley due to ongoing alcohol abuse  RUQ US with \"severe hepatic steatosis.  Coexisting fibrotic or inflammatory changes not excluded\"  Suspect developing cirrhosis due to   Varices and portal HTN on CT  Coagulopathy: INR approx 1.2--1.4  Thrombocytopenia  Hepatic encephalopathy/hyperammonemia  Continue current management, alcohol cessation, and rehab referrals  Per GI: should have outpatient ultrasound elastography for stratification of liver fibrosis  "

## 2024-01-18 NOTE — PHYSICAL THERAPY NOTE
Physical Therapy Treatment Note     01/18/24 1437   PT Last Visit   PT Visit Date 01/18/24   Note Type   Note Type Treatment   Pain Assessment   Pain Assessment Tool 0-10   Pain Score No Pain   Restrictions/Precautions   Weight Bearing Precautions Per Order No   Other Precautions Multiple lines;Fall Risk   General   Chart Reviewed Yes   Subjective   Subjective Pt. agreeable to PT   Transfers   Sit to Stand 7  Independent   Stand to Sit 7  Independent   Stand pivot 7  Independent   Ambulation/Elevation   Gait pattern Improper Weight shift  (lateral sways)   Gait Assistance 5  Supervision   Assistive Device None   Distance ~500 ft   Stair Management Assistance 6  Modified independent   Stair Management Technique Two rails;One rail L;Alternating pattern;Foreward;Reciprocal   Number of Stairs 16   Balance   Static Sitting Good   Dynamic Sitting Fair +   Static Standing Fair +   Dynamic Standing Fair   Ambulatory Fair   Activity Tolerance   Activity Tolerance Patient tolerated treatment well   Nurse Made Aware yes   Assessment   Prognosis Good   Problem List Decreased mobility   Assessment Pt. progressing well with her mobility and needed no hands on A for any mobility. pt. has achieved all her goals set in IE hence pt. is being DC from PT services at this time.   Barriers to Discharge None   Goals   Patient Goals None reported   STG Expiration Date 01/22/24   PT Treatment Day 2   Plan   Treatment/Interventions Elevations;Gait training;Bed mobility;Spoke to nursing   Progress Discontinue PT   PT Frequency 3-5x/wk   Discharge Recommendation   Rehab Resource Intensity Level, PT No post-acute rehabilitation needs   Equipment Recommended   (None)   AM-PAC Basic Mobility Inpatient   Turning in Flat Bed Without Bedrails 4   Lying on Back to Sitting on Edge of Flat Bed Without Bedrails 4   Moving Bed to Chair 4   Standing Up From Chair Using Arms 4   Walk in Room 4   Climb 3-5 Stairs With Railing 4   Basic Mobility Inpatient  Raw Score 24   Basic Mobility Standardized Score 57.68   Highest Level Of Mobility   -HLM Goal 8: Walk 250 feet or more   JH-HLM Achieved 8: Walk 250 feet ot more           Harman Gaviria PTA    An AM-PAC basic mobility standardized score less than 42.9 suggest the patient may benefit from discharge to post-acute rehab services.

## 2024-01-18 NOTE — ASSESSMENT & PLAN NOTE
Patient was significantly encephalopathic, lethargic with asterixis on exam upon arrival   Received thiamine protocol for concern for Warnicke's  Ammonia level noted to be 178 on admission  Was started on lactulose and Xifaxan  Lactulose dosage decreased and now made prn  Was started on rifaximin 550 mg BID sent for price check, not covered by insurance will require prior auth.   Monitor off rifaxamin can consider starting in outpatient setting if clinically warrented  Encephalopathy now resolved - Monitor mentation- conversing and is alert and oriented x 3

## 2024-01-18 NOTE — PLAN OF CARE
Problem: OCCUPATIONAL THERAPY ADULT  Goal: Performs self-care activities at highest level of function for planned discharge setting.  See evaluation for individualized goals.  Description: Treatment Interventions: ADL retraining, Functional transfer training, Cognitive reorientation, UE strengthening/ROM, Endurance training, Patient/family training, Equipment evaluation/education, Compensatory technique education, Energy conservation, Activityengagement          See flowsheet documentation for full assessment, interventions and recommendations.   Outcome: Adequate for Discharge  Note: Limitation: Decreased ADL status, Decreased UE strength, Decreased Safe judgement during ADL, Decreased endurance, Decreased high-level ADLs, Decreased self-care trans  Prognosis: Fair  Assessment: Pt greeted bedside for OT treatment on 01/18/24 focusing on maximizing independence with ADLs and functional cognition. Pt completes bed mobility with I, functional transfers with I and functional mobility with I with no AD. Pt engages in ADL routine seated on EOB: I with grooming,I with LB dressing, and I with toileting. Pt provided education on energy conservation techniques with ADLs upon DC and Pt understanding. Pt encouraged to participate in ADLs/functional mobility with nursing/restorative staff during hospitalization. Pt with no further acute OT concerns. Pt would benefit from returning home with increased social support upon DC to maximize safety and independence with ADLs and functional tasks of choice. DC skilled OT services.     Rehab Resource Intensity Level, OT: No post-acute rehabilitation needs

## 2024-01-18 NOTE — ASSESSMENT & PLAN NOTE
Results from last 7 days   Lab Units 01/18/24  0514 01/16/24  0559 01/15/24  0805 01/14/24  0517   POTASSIUM mmol/L 3.5 4.6 3.6 3.7   MAGNESIUM mg/dL 1.3* 1.5* 1.4* 1.4*   Treating for hypomagnesemia and hypokalemia  Suspect secondary to passing mixed/loose stool - had 3-4 BM yesterday  Potassium stable on 40 mEq twice daily   Giving mag 2 mg x 2 doses today   Add probiotic and fiber supplement today to help bulk stools
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
## 2024-01-18 NOTE — ASSESSMENT & PLAN NOTE
Likely combination of poor oral intake, low solute intake, excessive free water due to beer potomania  Na now improving
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
## 2024-01-18 NOTE — ASSESSMENT & PLAN NOTE
Pt presented to [facility] Detox unit with acute alcoholic hepatitis  Initial  and T bili 4.7  Pt was tx with supportive measures:  discriminant function <32 and steroids not indicated  Discriminant function:  5--> steroids again not indicated  AST and bilirubin now decreased

## 2024-01-19 ENCOUNTER — APPOINTMENT (INPATIENT)
Dept: CT IMAGING | Facility: HOSPITAL | Age: 49
DRG: 253 | End: 2024-01-19
Payer: COMMERCIAL
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
## 2024-01-19 LAB
ALBUMIN SERPL BCP-MCNC: 3.3 G/DL (ref 3.5–5)
ALP SERPL-CCNC: 124 U/L (ref 34–104)
ALT SERPL W P-5'-P-CCNC: 20 U/L (ref 7–52)
ANION GAP SERPL CALCULATED.3IONS-SCNC: 8 MMOL/L
AST SERPL W P-5'-P-CCNC: 75 U/L (ref 13–39)
BILIRUB SERPL-MCNC: 2.04 MG/DL (ref 0.2–1)
BUN SERPL-MCNC: 6 MG/DL (ref 5–25)
CALCIUM ALBUM COR SERPL-MCNC: 8.8 MG/DL (ref 8.3–10.1)
CALCIUM SERPL-MCNC: 8.2 MG/DL (ref 8.4–10.2)
CHLORIDE SERPL-SCNC: 108 MMOL/L (ref 96–108)
CO2 SERPL-SCNC: 19 MMOL/L (ref 21–32)
CREAT SERPL-MCNC: 0.38 MG/DL (ref 0.6–1.3)
ERYTHROCYTE [DISTWIDTH] IN BLOOD BY AUTOMATED COUNT: 15.1 % (ref 11.6–15.1)
GFR SERPL CREATININE-BSD FRML MDRD: 126 ML/MIN/1.73SQ M
GLUCOSE SERPL-MCNC: 123 MG/DL (ref 65–140)
GLUCOSE SERPL-MCNC: 84 MG/DL (ref 65–140)
HCT VFR BLD AUTO: 28.1 % (ref 34.8–46.1)
HGB BLD-MCNC: 9.2 G/DL (ref 11.5–15.4)
MAGNESIUM SERPL-MCNC: 1.6 MG/DL (ref 1.9–2.7)
MCH RBC QN AUTO: 31.1 PG (ref 26.8–34.3)
MCHC RBC AUTO-ENTMCNC: 32.7 G/DL (ref 31.4–37.4)
MCV RBC AUTO: 95 FL (ref 82–98)
PLATELET # BLD AUTO: 391 THOUSANDS/UL (ref 149–390)
PMV BLD AUTO: 10.1 FL (ref 8.9–12.7)
POTASSIUM SERPL-SCNC: 3 MMOL/L (ref 3.5–5.3)
PROT SERPL-MCNC: 6.2 G/DL (ref 6.4–8.4)
RBC # BLD AUTO: 2.96 MILLION/UL (ref 3.81–5.12)
SODIUM SERPL-SCNC: 135 MMOL/L (ref 135–147)
WBC # BLD AUTO: 9.73 THOUSAND/UL (ref 4.31–10.16)

## 2024-01-19 PROCEDURE — 99232 SBSQ HOSP IP/OBS MODERATE 35: CPT | Performed by: PHYSICIAN ASSISTANT

## 2024-01-19 PROCEDURE — 83735 ASSAY OF MAGNESIUM: CPT | Performed by: PHYSICIAN ASSISTANT

## 2024-01-19 PROCEDURE — 70450 CT HEAD/BRAIN W/O DYE: CPT

## 2024-01-19 PROCEDURE — G1004 CDSM NDSC: HCPCS

## 2024-01-19 PROCEDURE — 82948 REAGENT STRIP/BLOOD GLUCOSE: CPT

## 2024-01-19 PROCEDURE — 85027 COMPLETE CBC AUTOMATED: CPT | Performed by: PHYSICIAN ASSISTANT

## 2024-01-19 PROCEDURE — 80053 COMPREHEN METABOLIC PANEL: CPT | Performed by: PHYSICIAN ASSISTANT

## 2024-01-19 RX ORDER — POTASSIUM CHLORIDE 20 MEQ/1
40 TABLET, EXTENDED RELEASE ORAL 2 TIMES DAILY
Status: DISCONTINUED | OUTPATIENT
Start: 2024-01-19 | End: 2024-01-20 | Stop reason: HOSPADM

## 2024-01-19 RX ORDER — LANOLIN ALCOHOL/MO/W.PET/CERES
400 CREAM (GRAM) TOPICAL 2 TIMES DAILY
Status: DISCONTINUED | OUTPATIENT
Start: 2024-01-19 | End: 2024-01-20 | Stop reason: HOSPADM

## 2024-01-19 RX ADMIN — SODIUM CHLORIDE, SODIUM GLUCONATE, SODIUM ACETATE, POTASSIUM CHLORIDE, MAGNESIUM CHLORIDE, SODIUM PHOSPHATE, DIBASIC, AND POTASSIUM PHOSPHATE 100 ML/HR: .53; .5; .37; .037; .03; .012; .00082 INJECTION, SOLUTION INTRAVENOUS at 02:34

## 2024-01-19 RX ADMIN — POTASSIUM CHLORIDE 40 MEQ: 1500 TABLET, EXTENDED RELEASE ORAL at 17:20

## 2024-01-19 RX ADMIN — Medication 1 TABLET: at 09:36

## 2024-01-19 RX ADMIN — POTASSIUM CHLORIDE 40 MEQ: 1500 TABLET, EXTENDED RELEASE ORAL at 09:37

## 2024-01-19 RX ADMIN — Medication 250 MG: at 09:37

## 2024-01-19 RX ADMIN — Medication 250 MG: at 17:20

## 2024-01-19 RX ADMIN — THIAMINE HCL TAB 100 MG 100 MG: 100 TAB at 09:36

## 2024-01-19 RX ADMIN — PANTOPRAZOLE SODIUM 40 MG: 40 TABLET, DELAYED RELEASE ORAL at 05:04

## 2024-01-19 RX ADMIN — MAGNESIUM OXIDE TAB 400 MG (241.3 MG ELEMENTAL MG) 400 MG: 400 (241.3 MG) TAB at 10:56

## 2024-01-19 RX ADMIN — ESCITALOPRAM OXALATE 10 MG: 10 TABLET ORAL at 09:37

## 2024-01-19 RX ADMIN — SODIUM CHLORIDE, SODIUM GLUCONATE, SODIUM ACETATE, POTASSIUM CHLORIDE, MAGNESIUM CHLORIDE, SODIUM PHOSPHATE, DIBASIC, AND POTASSIUM PHOSPHATE 100 ML/HR: .53; .5; .37; .037; .03; .012; .00082 INJECTION, SOLUTION INTRAVENOUS at 13:33

## 2024-01-19 RX ADMIN — MAGNESIUM OXIDE TAB 400 MG (241.3 MG ELEMENTAL MG) 400 MG: 400 (241.3 MG) TAB at 17:20

## 2024-01-19 RX ADMIN — FOLIC ACID 1 MG: 1 TABLET ORAL at 09:37

## 2024-01-19 RX ADMIN — ENOXAPARIN SODIUM 40 MG: 40 INJECTION SUBCUTANEOUS at 09:37

## 2024-01-19 RX ADMIN — LORAZEPAM 0.5 MG: 0.5 TABLET ORAL at 17:20

## 2024-01-19 NOTE — ASSESSMENT & PLAN NOTE
Status post PRBC, IVF and IV albumin while hospitalized  Episodes of hypotension possibly related to GI losses   Complicated by poor access and unable to be properly fluid resuscitated   Had midline but continues to have issues with leaking - now has peripheral IV in  Ongoing workup obtained - Checked TSH, orthostatic vitals  Lying 98/67, sitting 103/74, standing 101/74  TSH elevated at 30 - free t4 within normal limits at 0.99  Ongoing treatment with IV fluid

## 2024-01-19 NOTE — ASSESSMENT & PLAN NOTE
Patient was transferred from [facility] detox unit to [facility] ICU on 1/5/24 for hematemesis  Pt was evaluated by GI team and initially placed on octreotide infusion  CT scan with evidence of varices and portal HTN  Continues on oral Protonix   S/p Rocephin for SBP prophylaxis-completed 7 days  S/p Octreotide drip   Had associated ABLA and hypotension and was transfused 1 unit PRBC on 1/8/24  Underwent EGD 1/8/2024-normal esophagus, no esophageal or gastric varices, 3 cm hiatal hernia, likely Bill's esophagus, mild portal hypertensive gastropathy and body of stomach, first part of duodenum and second part of duodenum appearing normal.  No old or fresh blood.   No further signs or evidence of bleeding.  Hemoglobin stable between 8 and 9

## 2024-01-19 NOTE — ASSESSMENT & PLAN NOTE
Patient was initially admitted to [place] detox on 1/4/24 and was treated with SEWS protocol with phenobarbital  She was transferred to [facility] ICU 1/6/24 for evaluation of GI bleed   She was started on serax but it was further discontinued 1/7 due to somnolence  S/p CIWA protocol  Pt received iv thiamine 500 mg IV q8h x 2 days, then 250 mg IV daily x 5 days  Now on oral thiamine and folic acid  Patient reported she wants to be done drinking.  Most of her drinking stems from anxiety. Started on Lexapro here  Underwent evaluation HOST- now agreeable to go to inpatient alcohol rehab when medically stable- KARTHIKEYAN cabrera
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
## 2024-01-19 NOTE — QUICK NOTE
Nursing reached out that patient sustained an unwitnessed fall in her bathroom this morning.  Pt assessed. She states that she was in the bathroom washing her face with her face turned down toward the sink. When she lifted her head up from bending over the sink, she became very lightheaded and passed out. Pt reports that she woke up on the floor. Pt states that she did hit her head in the fall and has a sore spot on the back of her head.   She denies any pain in her neck and has full, nonpainful ROM  Pt denies any other pain or injury in the fall  Vitals stable. Mildly low bp 92/62 which appears to be patient baseline. BG 82. Vitals stable otherwise.   Neurologically intact. Neuro exam without abnormalities. AAOx4. HRRR and lungs clear. Back of head does have small lump and tender to palpation.     Plan  Will order STAT CT head given head strike with tender lump to palpation   Initiate neuro checks   Fall precautions

## 2024-01-19 NOTE — ASSESSMENT & PLAN NOTE
Patient was significantly encephalopathic, lethargic with asterixis on exam upon arrival   Received thiamine protocol for concern for Warnicke's  Ammonia level noted to be 178 on admission  Was started on lactulose and Xifaxan  Lactulose dosage decreased and now made prn given frequent BMs  Was started on rifaximin 550 mg BID sent for price check, not covered by insurance will require prior auth.   Monitor off rifaxamin can consider starting in outpatient setting if clinically warrented  Encephalopathy now resolved - Monitor mentation- conversing and is alert and oriented x 3
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
## 2024-01-19 NOTE — ASSESSMENT & PLAN NOTE
Results from last 7 days   Lab Units 01/19/24  0806 01/18/24  0514 01/16/24  0559 01/15/24  0805   POTASSIUM mmol/L 3.0* 3.5 4.6 3.6   MAGNESIUM mg/dL 1.6* 1.3* 1.5* 1.4*   Treat for ongoing hypomagnesemia and hypokalemia  Suspect secondary to passing mixed/loose stool - had multiple BM again yesterday  Add probiotic and fiber supplement today to help bulk stools 1/18  Increase potassium supplementation   Continue with oral magnesium supplementation

## 2024-01-19 NOTE — ASSESSMENT & PLAN NOTE
Unwitnessed fall this morning  Evaluated by on call provider shortly after  No neurodeficits and stat CT imaging performed  CT head with no evidence of acute abnormality   Continue neurochecks x 24 hours

## 2024-01-19 NOTE — ASSESSMENT & PLAN NOTE
Results from last 7 days   Lab Units 01/19/24  0806 01/18/24  0514 01/17/24  1533 01/16/24  1024   HEMOGLOBIN g/dL 9.2* 8.3* 9.1* 8.5*   Pt has h/o chronic anemia, likely due to anemia of chronic disease with baseline 10-11  Pt developed ABLA in the setting of hematemesis and Hb dropped to 7.2  Was transfused 1u PRBC on 1/8/24 with improvement in Hgb  Underwent EGD 1/9/24 without evidence of bleeding  No signs or symptoms of ongoing bleeding and octreotide was stopped
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
## 2024-01-19 NOTE — ASSESSMENT & PLAN NOTE
Malnutrition Findings:   Adult Malnutrition type: Chronic illness  Adult Degree of Malnutrition: Other severe protein calorie malnutrition  Malnutrition Characteristics: Muscle loss, Fat loss  360 Statement: severe protein calorie malnutrition related to inadequate protein intake and lack of nutrient-dense foods with excessive ETOH intake as evidenced by  consumption of 8-10 beers daily and hard liquor although she can not quantify how much liquor; severe muscle wasting(temporalis, pectoralis, fat loss (orbital fat pads, triceps), treated with TBD  BMI Findings:  Adult BMI Classifications: Underweight < 18.5  Body mass index is 18.55 kg/m².

## 2024-01-19 NOTE — PLAN OF CARE
Problem: DISCHARGE PLANNING  Goal: Discharge to home or other facility with appropriate resources  Description: INTERVENTIONS:  - Identify barriers to discharge w/patient and caregiver  - Arrange for needed discharge resources and transportation as appropriate  - Identify discharge learning needs (meds, wound care, etc.)  - Arrange for interpretive services to assist at discharge as needed  - Refer to Case Management Department for coordinating discharge planning if the patient needs post-hospital services based on physician/advanced practitioner order or complex needs related to functional status, cognitive ability, or social support system  Outcome: Progressing     Problem: Knowledge Deficit  Goal: Patient/family/caregiver demonstrates understanding of disease process, treatment plan, medications, and discharge instructions  Description: Complete learning assessment and assess knowledge base.  Interventions:  - Provide teaching at level of understanding  - Provide teaching via preferred learning methods  Outcome: Progressing     Problem: Nutrition/Hydration-ADULT  Goal: Nutrient/Hydration intake appropriate for improving, restoring or maintaining nutritional needs  Description: Monitor and assess patient's nutrition/hydration status for malnutrition. Collaborate with interdisciplinary team and initiate plan and interventions as ordered.  Monitor patient's weight and dietary intake as ordered or per policy. Utilize nutrition screening tool and intervene as necessary. Determine patient's food preferences and provide high-protein, high-caloric foods as appropriate.     INTERVENTIONS:  - Monitor oral intake, urinary output, labs, and treatment plans  - Assess nutrition and hydration status and recommend course of action  - Evaluate amount of meals eaten  - Assist patient with eating if necessary   - Allow adequate time for meals  - Recommend/ encourage appropriate diets, oral nutritional supplements, and  vitamin/mineral supplements  - Order, calculate, and assess calorie counts as needed  - Recommend, monitor, and adjust tube feedings and TPN/PPN based on assessed needs  - Assess need for intravenous fluids  - Provide specific nutrition/hydration education as appropriate  - Include patient/family/caregiver in decisions related to nutrition  Outcome: Progressing

## 2024-01-19 NOTE — PLAN OF CARE
Problem: Prexisting or High Potential for Compromised Skin Integrity  Goal: Skin integrity is maintained or improved  Description: INTERVENTIONS:  - Identify patients at risk for skin breakdown  - Assess and monitor skin integrity  - Assess and monitor nutrition and hydration status  - Monitor labs   - Assess for incontinence   - Turn and reposition patient  - Assist with mobility/ambulation  - Relieve pressure over bony prominences  - Avoid friction and shearing  - Provide appropriate hygiene as needed including keeping skin clean and dry  - Evaluate need for skin moisturizer/barrier cream  - Collaborate with interdisciplinary team   - Patient/family teaching  - Consider wound care consult   Outcome: Progressing     Problem: PAIN - ADULT  Goal: Verbalizes/displays adequate comfort level or baseline comfort level  Description: Interventions:  - Encourage patient to monitor pain and request assistance  - Assess pain using appropriate pain scale  - Administer analgesics based on type and severity of pain and evaluate response  - Implement non-pharmacological measures as appropriate and evaluate response  - Consider cultural and social influences on pain and pain management  - Notify physician/advanced practitioner if interventions unsuccessful or patient reports new pain  Outcome: Progressing     Problem: INFECTION - ADULT  Goal: Absence or prevention of progression during hospitalization  Description: INTERVENTIONS:  - Assess and monitor for signs and symptoms of infection  - Monitor lab/diagnostic results  - Monitor all insertion sites, i.e. indwelling lines, tubes, and drains  - Monitor endotracheal if appropriate and nasal secretions for changes in amount and color  - [place] appropriate cooling/warming therapies per order  - Administer medications as ordered  - Instruct and encourage patient and family to use good hand hygiene technique  - Identify and instruct in appropriate isolation precautions for  identified infection/condition  Outcome: Progressing  Goal: Absence of fever/infection during neutropenic period  Description: INTERVENTIONS:  - Monitor WBC    Outcome: Progressing     Problem: SAFETY ADULT  Goal: Patient will remain free of falls  Description: INTERVENTIONS:  - Educate patient/family on patient safety including physical limitations  - Instruct patient to call for assistance with activity   - Consult OT/PT to assist with strengthening/mobility   - Keep Call bell within reach  - Keep bed low and locked with side rails adjusted as appropriate  - Keep care items and personal belongings within reach  - Initiate and maintain comfort rounds  - Make Fall Risk Sign visible to staff  - Offer Toileting every 2 Hours, in advance of need  - Initiate/Maintain bed alarm  - Apply yellow socks and bracelet for high fall risk patients  - Consider moving patient to room near nurses station  Outcome: Progressing  Goal: Maintain or return to baseline ADL function  Description: INTERVENTIONS:  -  Assess patient's ability to carry out ADLs; assess patient's baseline for ADL function and identify physical deficits which impact ability to perform ADLs (bathing, care of mouth/teeth, toileting, grooming, dressing, etc.)  - Assess/evaluate cause of self-care deficits   - Assess range of motion  - Assess patient's mobility; develop plan if impaired  - Assess patient's need for assistive devices and provide as appropriate  - Encourage maximum independence but intervene and supervise when necessary  - Involve family in performance of ADLs  - Assess for home care needs following discharge   - Consider OT consult to assist with ADL evaluation and planning for discharge  - Provide patient education as appropriate  Outcome: Progressing  Goal: Maintains/Returns to pre admission functional level  Description: INTERVENTIONS:  - Perform AM-PAC 6 Click Basic Mobility/ Daily Activity assessment daily.  - Set and communicate daily mobility  goal to care team and patient/family/caregiver.   - Collaborate with rehabilitation services on mobility goals if consulted  - Out of bed for toileting  - Record patient progress and toleration of activity level   Outcome: Progressing     Problem: DISCHARGE PLANNING  Goal: Discharge to home or other facility with appropriate resources  Description: INTERVENTIONS:  - Identify barriers to discharge w/patient and caregiver  - Arrange for needed discharge resources and transportation as appropriate  - Identify discharge learning needs (meds, wound care, etc.)  - Arrange for interpretive services to assist at discharge as needed  - Refer to Case Management Department for coordinating discharge planning if the patient needs post-hospital services based on physician/advanced practitioner order or complex needs related to functional status, cognitive ability, or social support system  Outcome: Progressing     Problem: Knowledge Deficit  Goal: Patient/family/caregiver demonstrates understanding of disease process, treatment plan, medications, and discharge instructions  Description: Complete learning assessment and assess knowledge base.  Interventions:  - Provide teaching at level of understanding  - Provide teaching via preferred learning methods  Outcome: Progressing     Problem: Nutrition/Hydration-ADULT  Goal: Nutrient/Hydration intake appropriate for improving, restoring or maintaining nutritional needs  Description: Monitor and assess patient's nutrition/hydration status for malnutrition. Collaborate with interdisciplinary team and initiate plan and interventions as ordered.  Monitor patient's weight and dietary intake as ordered or per policy. Utilize nutrition screening tool and intervene as necessary. Determine patient's food preferences and provide high-protein, high-caloric foods as appropriate.     INTERVENTIONS:  - Monitor oral intake, urinary output, labs, and treatment plans  - Assess nutrition and hydration  status and recommend course of action  - Evaluate amount of meals eaten  - Assist patient with eating if necessary   - Allow adequate time for meals  - Recommend/ encourage appropriate diets, oral nutritional supplements, and vitamin/mineral supplements  - Order, calculate, and assess calorie counts as needed  - Recommend, monitor, and adjust tube feedings and TPN/PPN based on assessed needs  - Assess need for intravenous fluids  - Provide specific nutrition/hydration education as appropriate  - Include patient/family/caregiver in decisions related to nutrition  Outcome: Progressing     Problem: NEUROSENSORY - ADULT  Goal: Achieves stable or improved neurological status  Description: INTERVENTIONS  - Monitor and report changes in neurological status  - Monitor vital signs such as temperature, blood pressure, glucose, and any other labs ordered   - Initiate measures to prevent increased intracranial pressure  - Monitor for seizure activity and implement precautions if appropriate      Outcome: Progressing  Goal: Achieves maximal functionality and self care  Description: INTERVENTIONS  - Monitor swallowing and airway patency with patient fatigue and changes in neurological status  - Encourage and assist patient to increase activity and self care.   - Encourage visually impaired, hearing impaired and aphasic patients to use assistive/communication devices  Outcome: Progressing     Problem: GASTROINTESTINAL - ADULT  Goal: Minimal or absence of nausea and/or vomiting  Description: INTERVENTIONS:  - Administer IV fluids if ordered to ensure adequate hydration  - Maintain NPO status until nausea and vomiting are resolved  - Nasogastric tube if ordered  - Administer ordered antiemetic medications as needed  - Provide nonpharmacologic comfort measures as appropriate  - Advance diet as tolerated, if ordered  - Consider nutrition services referral to assist patient with adequate nutrition and appropriate food choices  Outcome:  Progressing  Goal: Maintains adequate nutritional intake  Description: INTERVENTIONS:  - Monitor percentage of each meal consumed  - Identify factors contributing to decreased intake, treat as appropriate  - Assist with meals as needed  - Monitor I&O, weight, and lab values if indicated  - Obtain nutrition services referral as needed  Outcome: Progressing  Goal: Oral mucous membranes remain intact  Description: INTERVENTIONS  - Assess oral mucosa and hygiene practices  - Implement preventative oral hygiene regimen  - Implement oral medicated treatments as ordered  - Initiate Nutrition services referral as needed  Outcome: Progressing     Problem: GENITOURINARY - ADULT  Goal: Absence of urinary retention  Description: INTERVENTIONS:  - Assess patient’s ability to void and empty bladder  - Monitor I/O  - Bladder scan as needed  - Discuss with physician/AP medications to alleviate retention as needed  - Discuss catheterization for long term situations as appropriate  Outcome: Progressing     Problem: METABOLIC, FLUID AND ELECTROLYTES - ADULT  Goal: Electrolytes maintained within normal limits  Description: INTERVENTIONS:  - Monitor labs and assess patient for signs and symptoms of electrolyte imbalances  - Administer electrolyte replacement as ordered  - Monitor response to electrolyte replacements, including repeat lab results as appropriate  - Instruct patient on fluid and nutrition as appropriate  Outcome: Progressing  Goal: Fluid balance maintained  Description: INTERVENTIONS:  - Monitor labs   - Monitor I/O and WT  - Instruct patient on fluid and nutrition as appropriate  - Assess for signs & symptoms of volume excess or deficit  Outcome: Progressing     Problem: SKIN/TISSUE INTEGRITY - ADULT  Goal: Skin Integrity remains intact(Skin Breakdown Prevention)  Description: Assess:  -Perform Leon assessment every shift  -Clean and moisturize skin every shift  -Inspect skin when repositioning, toileting, and assisting  with ADLS  -Assess extremities for adequate circulation and sensation     Bed Management:  -Have minimal linens on bed & keep smooth, unwrinkled  -Change linens as needed when moist or perspiring  -Avoid sitting or lying in one position for more than 2 hours while in bed  -Keep HOB at 30 degrees     Toileting:  -Offer bedside commode  -Assess for incontinence every shift    Activity:  -Mobilize patient 4 times a day  -Encourage activity and walks on unit  -Encourage or provide ROM exercises   -Turn and reposition patient every 2 Hours  -Use appropriate equipment to lift or move patient in bed    Skin Care:  -Avoid use of baby powder, tape, friction and shearing, hot water or constrictive clothing  -Do not massage red bony areas    Outcome: Progressing     Problem: MUSCULOSKELETAL - ADULT  Goal: Maintain or return mobility to safest level of function  Description: INTERVENTIONS:  - Assess patient's ability to carry out ADLs; assess patient's baseline for ADL function and identify physical deficits which impact ability to perform ADLs (bathing, care of mouth/teeth, toileting, grooming, dressing, etc.)  - Assess/evaluate cause of self-care deficits   - Assess range of motion  - Assess patient's mobility  - Assess patient's need for assistive devices and provide as appropriate  - Encourage maximum independence but intervene and supervise when necessary  - Involve family in performance of ADLs  - Assess for home care needs following discharge   - Consider OT consult to assist with ADL evaluation and planning for discharge  - Provide patient education as appropriate  Outcome: Progressing

## 2024-01-19 NOTE — ASSESSMENT & PLAN NOTE
Pt presented to [facility] Detox unit with acute alcoholic hepatitis  Initial  and T bili 4.7  Pt was tx with supportive measures:  discriminant function <32 and steroids not indicated  Discriminant function:  5--> steroids again not indicated  AST and bilirubin now decreased
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
## 2024-01-19 NOTE — ASSESSMENT & PLAN NOTE
Likely combination of poor oral intake, low solute intake, excessive free water due to beer potomania  Na has much improved and now normalized

## 2024-01-19 NOTE — ASSESSMENT & PLAN NOTE
Long history of anxiety.  Was on Paxil and Ativan long-term.    Taken off Paxil due to pregnancy, then suffered with postpartum depression was resumed.    Anxiety was not well-controlled and patient reports she turned to drinking to relax and sleep better at night  Started on Lexapro 10 mg daily, patient agreeable  Ativan as needed  Supportive care  Seen and evaluated by psychiatry-offered to start low-dose gabapentin-patient declined   Patient had both ovaries removed when she had hysterectomy

## 2024-01-20 VITALS
DIASTOLIC BLOOD PRESSURE: 70 MMHG | BODY MASS INDEX: 18.78 KG/M2 | TEMPERATURE: 99 F | HEART RATE: 64 BPM | SYSTOLIC BLOOD PRESSURE: 106 MMHG | OXYGEN SATURATION: 97 % | WEIGHT: 102.07 LBS | HEIGHT: 62 IN | RESPIRATION RATE: 18 BRPM

## 2024-01-20 LAB
ALBUMIN SERPL BCP-MCNC: 3 G/DL (ref 3.5–5)
ALP SERPL-CCNC: 117 U/L (ref 34–104)
ALT SERPL W P-5'-P-CCNC: 18 U/L (ref 7–52)
ANION GAP SERPL CALCULATED.3IONS-SCNC: 6 MMOL/L
AST SERPL W P-5'-P-CCNC: 68 U/L (ref 13–39)
BILIRUB SERPL-MCNC: 1.76 MG/DL (ref 0.2–1)
BUN SERPL-MCNC: 5 MG/DL (ref 5–25)
CALCIUM ALBUM COR SERPL-MCNC: 9.3 MG/DL (ref 8.3–10.1)
CALCIUM SERPL-MCNC: 8.5 MG/DL (ref 8.4–10.2)
CHLORIDE SERPL-SCNC: 109 MMOL/L (ref 96–108)
CO2 SERPL-SCNC: 21 MMOL/L (ref 21–32)
CREAT SERPL-MCNC: 0.33 MG/DL (ref 0.6–1.3)
ERYTHROCYTE [DISTWIDTH] IN BLOOD BY AUTOMATED COUNT: 15.1 % (ref 11.6–15.1)
GFR SERPL CREATININE-BSD FRML MDRD: 132 ML/MIN/1.73SQ M
GLUCOSE SERPL-MCNC: 88 MG/DL (ref 65–140)
HCT VFR BLD AUTO: 26.2 % (ref 34.8–46.1)
HGB BLD-MCNC: 8.7 G/DL (ref 11.5–15.4)
MAGNESIUM SERPL-MCNC: 1.6 MG/DL (ref 1.9–2.7)
MCH RBC QN AUTO: 31.3 PG (ref 26.8–34.3)
MCHC RBC AUTO-ENTMCNC: 33.2 G/DL (ref 31.4–37.4)
MCV RBC AUTO: 94 FL (ref 82–98)
PLATELET # BLD AUTO: 398 THOUSANDS/UL (ref 149–390)
PMV BLD AUTO: 10.1 FL (ref 8.9–12.7)
POTASSIUM SERPL-SCNC: 3.7 MMOL/L (ref 3.5–5.3)
PROT SERPL-MCNC: 5.9 G/DL (ref 6.4–8.4)
RBC # BLD AUTO: 2.78 MILLION/UL (ref 3.81–5.12)
SODIUM SERPL-SCNC: 136 MMOL/L (ref 135–147)
WBC # BLD AUTO: 9.29 THOUSAND/UL (ref 4.31–10.16)

## 2024-01-20 PROCEDURE — 83735 ASSAY OF MAGNESIUM: CPT | Performed by: PHYSICIAN ASSISTANT

## 2024-01-20 PROCEDURE — 85027 COMPLETE CBC AUTOMATED: CPT | Performed by: PHYSICIAN ASSISTANT

## 2024-01-20 PROCEDURE — 99239 HOSP IP/OBS DSCHRG MGMT >30: CPT | Performed by: PHYSICIAN ASSISTANT

## 2024-01-20 PROCEDURE — 80053 COMPREHEN METABOLIC PANEL: CPT | Performed by: PHYSICIAN ASSISTANT

## 2024-01-20 PROCEDURE — RECHECK: Performed by: PHYSICIAN ASSISTANT

## 2024-01-20 RX ORDER — FOLIC ACID 1 MG/1
1 TABLET ORAL DAILY
Qty: 20 TABLET | Refills: 0 | Status: SHIPPED | OUTPATIENT
Start: 2024-01-21

## 2024-01-20 RX ORDER — LANOLIN ALCOHOL/MO/W.PET/CERES
400 CREAM (GRAM) TOPICAL DAILY
Qty: 7 TABLET | Refills: 0 | Status: SHIPPED | OUTPATIENT
Start: 2024-01-20

## 2024-01-20 RX ORDER — POTASSIUM CHLORIDE 20 MEQ/1
40 TABLET, EXTENDED RELEASE ORAL DAILY
Qty: 14 TABLET | Refills: 0 | Status: SHIPPED | OUTPATIENT
Start: 2024-01-20

## 2024-01-20 RX ORDER — LANOLIN ALCOHOL/MO/W.PET/CERES
100 CREAM (GRAM) TOPICAL DAILY
Qty: 20 TABLET | Refills: 0 | Status: SHIPPED | OUTPATIENT
Start: 2024-01-21

## 2024-01-20 RX ORDER — ESCITALOPRAM OXALATE 10 MG/1
10 TABLET ORAL DAILY
Qty: 30 TABLET | Refills: 0 | Status: SHIPPED | OUTPATIENT
Start: 2024-01-21

## 2024-01-20 RX ORDER — PANTOPRAZOLE SODIUM 40 MG/1
40 TABLET, DELAYED RELEASE ORAL DAILY
Qty: 30 TABLET | Refills: 0 | Status: SHIPPED | OUTPATIENT
Start: 2024-01-20

## 2024-01-20 RX ADMIN — POTASSIUM CHLORIDE 40 MEQ: 1500 TABLET, EXTENDED RELEASE ORAL at 08:23

## 2024-01-20 RX ADMIN — THIAMINE HCL TAB 100 MG 100 MG: 100 TAB at 08:24

## 2024-01-20 RX ADMIN — Medication 250 MG: at 08:24

## 2024-01-20 RX ADMIN — FOLIC ACID 1 MG: 1 TABLET ORAL at 08:23

## 2024-01-20 RX ADMIN — MAGNESIUM OXIDE TAB 400 MG (241.3 MG ELEMENTAL MG) 400 MG: 400 (241.3 MG) TAB at 08:24

## 2024-01-20 RX ADMIN — PANTOPRAZOLE SODIUM 40 MG: 40 TABLET, DELAYED RELEASE ORAL at 05:04

## 2024-01-20 RX ADMIN — Medication 1 PACKET: at 08:23

## 2024-01-20 RX ADMIN — LORAZEPAM 0.5 MG: 0.5 TABLET ORAL at 14:04

## 2024-01-20 RX ADMIN — Medication 1 TABLET: at 08:23

## 2024-01-20 RX ADMIN — ENOXAPARIN SODIUM 40 MG: 40 INJECTION SUBCUTANEOUS at 08:22

## 2024-01-20 RX ADMIN — ESCITALOPRAM OXALATE 10 MG: 10 TABLET ORAL at 08:24
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
## 2024-01-20 NOTE — ASSESSMENT & PLAN NOTE
Unwitnessed fall this morning  Evaluated by on call provider shortly after  No neurodeficits and stat CT imaging performed  CT head with no evidence of acute abnormality   Neurochecks have remained unremarkable

## 2024-01-20 NOTE — DISCHARGE INSTRUCTIONS
Dear Leslye Holland,     It was our pleasure to care for you here at [facility].  It is our hope that we were always able to exceed the expected standards for your care during your stay.  You were hospitalized due to alcoholism.  You were cared for on the 4th floor by Maddi Gray PA-C under the service of Harika Hooker * with the [facility] who covers for your primary care physician (PCP), No primary care provider on file., while you were hospitalized.  If you have any questions or concerns related to this hospitalization, you may contact us at .  For follow up as well as any medication refills, we recommend that you follow up with your primary care physician.  A registered nurse will reach out to you by phone within a few days after your discharge to answer any additional questions that you may have after going home.  However, at this time we provide for you here, the most important instructions / recommendations at discharge:       Notable Adjustments -   Treated for acute alcohol withdrawal and shows signs of possible early developing fibrosis/cirrhosis of your liver.  You will need outpatient follow-up with PCP as well as gastroenterology for further testing.      You were started on potassium as well as magnesium to replete your electrolytes.  You should continue supplementation.      You will need outpatient follow-up with labs in 1 week's time to recheck and further advisement should be performed by your primary care physician when she was tablet care.      You are to strictly avoid alcohol.    You should follow-up with post services for outpatient alcohol rehab.    You were given information on alcohol and cirrhosis, please review.    Please review this entire after visit summary as additional general instructions including medication list, appointments, activity, diet, any pertinent wound care, and other additional  recommendations from your care team that may be provided for you.      Sincerely,     Maddi Gray PA-C
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
## 2024-01-20 NOTE — ASSESSMENT & PLAN NOTE
Long history of anxiety.    Was on Paxil and Ativan long-term.    Taken off Paxil due to pregnancy, then suffered with postpartum depression was resumed.    Anxiety was not well-controlled and patient reports she turned to drinking to relax and sleep better at night  Started on Lexapro 10 mg daily, patient agreeable  Received Ativan as needed here  Supportive care  Seen and evaluated by psychiatry-offered to start low-dose gabapentin-patient declined  Will d.c home on lexapro 10 mg daily

## 2024-01-20 NOTE — ASSESSMENT & PLAN NOTE
Patient was significantly encephalopathic, lethargic with asterixis on exam upon arrival   Received thiamine protocol for concern for Warnicke's  Ammonia level noted to be 178 on admission  Was started on lactulose and Xifaxan  Lactulose dosage decreased and now made prn given frequent BMs  Was started on rifaximin 550 mg BID- sent for price check, not covered by insurance will require prior auth.   Monitor off rifaxamin can consider starting in outpatient setting if clinically warrented  Encephalopathy now resolved - Monitor mentation- conversing and is alert and oriented x 3

## 2024-01-20 NOTE — ASSESSMENT & PLAN NOTE
Malnutrition Findings:   Adult Malnutrition type: Chronic illness  Adult Degree of Malnutrition: Other severe protein calorie malnutrition  Malnutrition Characteristics: Muscle loss, Fat loss  360 Statement: severe protein calorie malnutrition related to inadequate protein intake and lack of nutrient-dense foods with excessive ETOH intake as evidenced by  consumption of 8-10 beers daily and hard liquor although she can not quantify how much liquor; severe muscle wasting(temporalis, pectoralis, fat loss (orbital fat pads, triceps), treated with TBD  BMI Findings:  Adult BMI Classifications: Underweight < 18.5  Body mass index is 18.67 kg/m².

## 2024-01-20 NOTE — ASSESSMENT & PLAN NOTE
Pt presented to [facility] Detox unit with acute alcoholic hepatitis  Initial  and T bili 4.7  Pt was tx with supportive measures:  discriminant function <32 and steroids not indicated  Discriminant function:  5--> steroids again not indicated  AST and bilirubin now decreased

## 2024-01-20 NOTE — PLAN OF CARE
Problem: Prexisting or High Potential for Compromised Skin Integrity  Goal: Skin integrity is maintained or improved  Description: INTERVENTIONS:  - Identify patients at risk for skin breakdown  - Assess and monitor skin integrity  - Assess and monitor nutrition and hydration status  - Monitor labs   - Assess for incontinence   - Turn and reposition patient  - Assist with mobility/ambulation  - Relieve pressure over bony prominences  - Avoid friction and shearing  - Provide appropriate hygiene as needed including keeping skin clean and dry  - Evaluate need for skin moisturizer/barrier cream  - Collaborate with interdisciplinary team   - Patient/family teaching  - Consider wound care consult   Outcome: Adequate for Discharge     Problem: PAIN - ADULT  Goal: Verbalizes/displays adequate comfort level or baseline comfort level  Description: Interventions:  - Encourage patient to monitor pain and request assistance  - Assess pain using appropriate pain scale  - Administer analgesics based on type and severity of pain and evaluate response  - Implement non-pharmacological measures as appropriate and evaluate response  - Consider cultural and social influences on pain and pain management  - Notify physician/advanced practitioner if interventions unsuccessful or patient reports new pain  Outcome: Adequate for Discharge     Problem: INFECTION - ADULT  Goal: Absence or prevention of progression during hospitalization  Description: INTERVENTIONS:  - Assess and monitor for signs and symptoms of infection  - Monitor lab/diagnostic results  - Monitor all insertion sites, i.e. indwelling lines, tubes, and drains  - Monitor endotracheal if appropriate and nasal secretions for changes in amount and color  - [place] appropriate cooling/warming therapies per order  - Administer medications as ordered  - Instruct and encourage patient and family to use good hand hygiene technique  - Identify and instruct in appropriate isolation  precautions for identified infection/condition  Outcome: Adequate for Discharge  Goal: Absence of fever/infection during neutropenic period  Description: INTERVENTIONS:  - Monitor WBC    Outcome: Adequate for Discharge     Problem: SAFETY ADULT  Goal: Patient will remain free of falls  Description: INTERVENTIONS:  - Educate patient/family on patient safety including physical limitations  - Instruct patient to call for assistance with activity   - Consult OT/PT to assist with strengthening/mobility   - Keep Call bell within reach  - Keep bed low and locked with side rails adjusted as appropriate  - Keep care items and personal belongings within reach  - Initiate and maintain comfort rounds  - Make Fall Risk Sign visible to staff  - Offer Toileting every 2 Hours, in advance of need  - Initiate/Maintain bed alarm  - Apply yellow socks and bracelet for high fall risk patients  - Consider moving patient to room near nurses station  Outcome: Adequate for Discharge  Goal: Maintain or return to baseline ADL function  Description: INTERVENTIONS:  -  Assess patient's ability to carry out ADLs; assess patient's baseline for ADL function and identify physical deficits which impact ability to perform ADLs (bathing, care of mouth/teeth, toileting, grooming, dressing, etc.)  - Assess/evaluate cause of self-care deficits   - Assess range of motion  - Assess patient's mobility; develop plan if impaired  - Assess patient's need for assistive devices and provide as appropriate  - Encourage maximum independence but intervene and supervise when necessary  - Involve family in performance of ADLs  - Assess for home care needs following discharge   - Consider OT consult to assist with ADL evaluation and planning for discharge  - Provide patient education as appropriate  Outcome: Adequate for Discharge  Goal: Maintains/Returns to pre admission functional level  Description: INTERVENTIONS:  - Perform AM-PAC 6 Click Basic Mobility/ Daily  Activity assessment daily.  - Set and communicate daily mobility goal to care team and patient/family/caregiver.   - Collaborate with rehabilitation services on mobility goals if consulted  - Out of bed for toileting  - Record patient progress and toleration of activity level   Outcome: Adequate for Discharge     Problem: DISCHARGE PLANNING  Goal: Discharge to home or other facility with appropriate resources  Description: INTERVENTIONS:  - Identify barriers to discharge w/patient and caregiver  - Arrange for needed discharge resources and transportation as appropriate  - Identify discharge learning needs (meds, wound care, etc.)  - Arrange for interpretive services to assist at discharge as needed  - Refer to Case Management Department for coordinating discharge planning if the patient needs post-hospital services based on physician/advanced practitioner order or complex needs related to functional status, cognitive ability, or social support system  Outcome: Adequate for Discharge     Problem: Knowledge Deficit  Goal: Patient/family/caregiver demonstrates understanding of disease process, treatment plan, medications, and discharge instructions  Description: Complete learning assessment and assess knowledge base.  Interventions:  - Provide teaching at level of understanding  - Provide teaching via preferred learning methods  Outcome: Adequate for Discharge     Problem: Nutrition/Hydration-ADULT  Goal: Nutrient/Hydration intake appropriate for improving, restoring or maintaining nutritional needs  Description: Monitor and assess patient's nutrition/hydration status for malnutrition. Collaborate with interdisciplinary team and initiate plan and interventions as ordered.  Monitor patient's weight and dietary intake as ordered or per policy. Utilize nutrition screening tool and intervene as necessary. Determine patient's food preferences and provide high-protein, high-caloric foods as appropriate.     INTERVENTIONS:  -  Monitor oral intake, urinary output, labs, and treatment plans  - Assess nutrition and hydration status and recommend course of action  - Evaluate amount of meals eaten  - Assist patient with eating if necessary   - Allow adequate time for meals  - Recommend/ encourage appropriate diets, oral nutritional supplements, and vitamin/mineral supplements  - Order, calculate, and assess calorie counts as needed  - Recommend, monitor, and adjust tube feedings and TPN/PPN based on assessed needs  - Assess need for intravenous fluids  - Provide specific nutrition/hydration education as appropriate  - Include patient/family/caregiver in decisions related to nutrition  Outcome: Adequate for Discharge     Problem: NEUROSENSORY - ADULT  Goal: Achieves stable or improved neurological status  Description: INTERVENTIONS  - Monitor and report changes in neurological status  - Monitor vital signs such as temperature, blood pressure, glucose, and any other labs ordered   - Initiate measures to prevent increased intracranial pressure  - Monitor for seizure activity and implement precautions if appropriate      Outcome: Adequate for Discharge  Goal: Achieves maximal functionality and self care  Description: INTERVENTIONS  - Monitor swallowing and airway patency with patient fatigue and changes in neurological status  - Encourage and assist patient to increase activity and self care.   - Encourage visually impaired, hearing impaired and aphasic patients to use assistive/communication devices  Outcome: Adequate for Discharge     Problem: GASTROINTESTINAL - ADULT  Goal: Minimal or absence of nausea and/or vomiting  Description: INTERVENTIONS:  - Administer IV fluids if ordered to ensure adequate hydration  - Maintain NPO status until nausea and vomiting are resolved  - Nasogastric tube if ordered  - Administer ordered antiemetic medications as needed  - Provide nonpharmacologic comfort measures as appropriate  - Advance diet as tolerated,  if ordered  - Consider nutrition services referral to assist patient with adequate nutrition and appropriate food choices  Outcome: Adequate for Discharge  Goal: Maintains adequate nutritional intake  Description: INTERVENTIONS:  - Monitor percentage of each meal consumed  - Identify factors contributing to decreased intake, treat as appropriate  - Assist with meals as needed  - Monitor I&O, weight, and lab values if indicated  - Obtain nutrition services referral as needed  Outcome: Adequate for Discharge  Goal: Oral mucous membranes remain intact  Description: INTERVENTIONS  - Assess oral mucosa and hygiene practices  - Implement preventative oral hygiene regimen  - Implement oral medicated treatments as ordered  - Initiate Nutrition services referral as needed  Outcome: Adequate for Discharge     Problem: GENITOURINARY - ADULT  Goal: Absence of urinary retention  Description: INTERVENTIONS:  - Assess patient’s ability to void and empty bladder  - Monitor I/O  - Bladder scan as needed  - Discuss with physician/AP medications to alleviate retention as needed  - Discuss catheterization for long term situations as appropriate  Outcome: Adequate for Discharge     Problem: METABOLIC, FLUID AND ELECTROLYTES - ADULT  Goal: Electrolytes maintained within normal limits  Description: INTERVENTIONS:  - Monitor labs and assess patient for signs and symptoms of electrolyte imbalances  - Administer electrolyte replacement as ordered  - Monitor response to electrolyte replacements, including repeat lab results as appropriate  - Instruct patient on fluid and nutrition as appropriate  Outcome: Adequate for Discharge  Goal: Fluid balance maintained  Description: INTERVENTIONS:  - Monitor labs   - Monitor I/O and WT  - Instruct patient on fluid and nutrition as appropriate  - Assess for signs & symptoms of volume excess or deficit  Outcome: Adequate for Discharge     Problem: MUSCULOSKELETAL - ADULT  Goal: Maintain or return  mobility to safest level of function  Description: INTERVENTIONS:  - Assess patient's ability to carry out ADLs; assess patient's baseline for ADL function and identify physical deficits which impact ability to perform ADLs (bathing, care of mouth/teeth, toileting, grooming, dressing, etc.)  - Assess/evaluate cause of self-care deficits   - Assess range of motion  - Assess patient's mobility  - Assess patient's need for assistive devices and provide as appropriate  - Encourage maximum independence but intervene and supervise when necessary  - Involve family in performance of ADLs  - Assess for home care needs following discharge   - Consider OT consult to assist with ADL evaluation and planning for discharge  - Provide patient education as appropriate  Outcome: Adequate for Discharge     Problem: SKIN/TISSUE INTEGRITY - ADULT  Goal: Skin Integrity remains intact(Skin Breakdown Prevention)  Description: Assess:  -Perform Leon assessment every   -Clean and moisturize skin every   -Inspect skin when repositioning, toileting, and assisting with ADLS  -Assess under medical devices such as  every   -Assess extremities for adequate circulation and sensation     Bed Management:  -Have minimal linens on bed & keep smooth, unwrinkled  -Change linens as needed when moist or perspiring  -Avoid sitting or lying in one position for more than  hours while in bed  -Keep HOB at degrees     Toileting:  -Offer bedside commode  -Assess for incontinence every   -Use incontinent care products after each incontinent episode such as     Activity:  -Mobilize patient  times a day  -Encourage activity and walks on unit  -Encourage or provide ROM exercises   -Turn and reposition patient every  Hours  -Use appropriate equipment to lift or move patient in bed  -Instruct/ Assist with weight shifting every  when out of bed in chair  -Consider limitation of chair time  hour intervals    Skin Care:  -Avoid use of baby powder, tape, friction and  shearing, hot water or constrictive clothing  -Relieve pressure over bony prominences using   -Do not massage red bony areas    Next Steps:  -Teach patient strategies to minimize risks such as    -Consider consults to  interdisciplinary teams such as   Outcome: Adequate for Discharge

## 2024-01-20 NOTE — ASSESSMENT & PLAN NOTE
Status post PRBC, IVF and IV albumin while hospitalized  Episodes of hypotension possibly related to GI losses   Complicated by poor access and unable to be properly fluid resuscitated   Had midline but continues to have issues with leaking - now has peripheral IV in  Ongoing workup obtained - Checked TSH, orthostatic vitals  Lying 98/67, sitting 103/74, standing 101/74  TSH elevated at 30 - free t4 within normal limits at 0.99  Ongoing treatment with IV fluid- BPs appearing to have improved at this time

## 2024-01-20 NOTE — ASSESSMENT & PLAN NOTE
Results from last 7 days   Lab Units 01/20/24  0507 01/19/24  0806 01/18/24  0514 01/16/24  0559   POTASSIUM mmol/L 3.7 3.0* 3.5 4.6   MAGNESIUM mg/dL 1.6* 1.6* 1.3* 1.5*   Treat for ongoing hypomagnesemia and hypokalemia  Suspect secondary to passing mixed/loose stool - had multiple BM again yesterday  Added probiotic and fiber supplement today to help bulk stools 1/18  Continue oral potassium supplementation BID  Continue with oral magnesium supplementation BID
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
## 2024-01-20 NOTE — ASSESSMENT & PLAN NOTE
Had SBP in the 80- 90s   Status post PRBC, IVF and IV albumin while hospitalized  Episodes of hypotension possibly related to GI losses   Complicated by poor access and slow fluid resuscitated   Had midline but continued to have issues with leaking - now has peripheral IV in  Workup obtained - TSH, orthostatic vitals  TSH elevated at 30 - free t4 within normal limits at 0.99  Bps finally improved today with most recent being 104/75

## 2024-01-20 NOTE — ASSESSMENT & PLAN NOTE
Results from last 7 days   Lab Units 01/20/24  0507 01/19/24  0806 01/18/24  0514 01/17/24  1533   HEMOGLOBIN g/dL 8.7* 9.2* 8.3* 9.1*   Pt has h/o chronic anemia, likely due to anemia of chronic disease with baseline 10-11  Pt developed ABLA in the setting of hematemesis and Hb dropped to 7.2  Was transfused 1u PRBC on 1/8/24 with improvement in Hgb  Underwent EGD 1/9/24 without evidence of bleeding  No signs or symptoms of ongoing bleeding and octreotide was stopped

## 2024-01-20 NOTE — ASSESSMENT & PLAN NOTE
TSH elevated at 30.570  T4 within normal range at 0.99  Advised importance of followup with repeat thyroid function studies in 4-6 weeks time  Needs establishment with PCP

## 2024-01-20 NOTE — ASSESSMENT & PLAN NOTE
"Pt has history of hepatic steatosis, likley due to ongoing alcohol abuse  RUQ US with \"severe hepatic steatosis.  Coexisting fibrotic or inflammatory changes not excluded\"  Suspect developing cirrhosis due to   Varices and portal HTN on CT  Coagulopathy: INR approx 1.2--1.4  Thrombocytopenia  Hepatic encephalopathy/hyperammonemia  Per GI: should have outpatient ultrasound elastography for stratification of liver fibrosis  Strict alcohol cessation discussed  "

## 2024-01-20 NOTE — ASSESSMENT & PLAN NOTE
Patient was initially admitted to [facility] on 1/4/24 and was treated with SEWS protocol with phenobarbital  She was transferred to [facility] ICU 1/6/24 for evaluation of GI bleed   She was started on serax but it was further discontinued 1/7 due to somnolence  S/p CIWA protocol  Pt received iv thiamine 500 mg IV q8h x 2 days, then 250 mg IV daily x 5 days  Now on oral thiamine and folic acid  Patient reported she wants to be done drinking.  Most of her drinking stems from anxiety. Started on Lexapro here  Underwent evaluation HOST- agreeable to alcohol rehab - medically stable - awaiting bed

## 2024-01-20 NOTE — ASSESSMENT & PLAN NOTE
Patient was transferred from [facility] detox unit to [facility] ICU on 1/5/24 for hematemesis  Pt was evaluated by GI team and initially placed on octreotide infusion  CT scan with evidence of varices and portal HTN  Continues on oral Protonix   S/p Rocephin for SBP prophylaxis-completed 7 days  S/p Octreotide drip   Had associated ABLA and hypotension and was transfused 1 unit PRBC on 1/8/24  Underwent EGD 1/8/2024-normal esophagus, no esophageal or gastric varices, 3 cm hiatal hernia, likely Bill's esophagus, mild portal hypertensive gastropathy and body of stomach, first part of duodenum and second part of duodenum appearing normal.  No old or fresh blood.   No further signs or evidence of bleeding.  Hemoglobin stable between 8 and 9

## 2024-01-20 NOTE — PLAN OF CARE
Problem: Prexisting or High Potential for Compromised Skin Integrity  Goal: Skin integrity is maintained or improved  Description: INTERVENTIONS:  - Identify patients at risk for skin breakdown  - Assess and monitor skin integrity  - Assess and monitor nutrition and hydration status  - Monitor labs   - Assess for incontinence   - Turn and reposition patient  - Assist with mobility/ambulation  - Relieve pressure over bony prominences  - Avoid friction and shearing  - Provide appropriate hygiene as needed including keeping skin clean and dry  - Evaluate need for skin moisturizer/barrier cream  - Collaborate with interdisciplinary team   - Patient/family teaching  - Consider wound care consult   Outcome: Progressing     Problem: PAIN - ADULT  Goal: Verbalizes/displays adequate comfort level or baseline comfort level  Description: Interventions:  - Encourage patient to monitor pain and request assistance  - Assess pain using appropriate pain scale  - Administer analgesics based on type and severity of pain and evaluate response  - Implement non-pharmacological measures as appropriate and evaluate response  - Consider cultural and social influences on pain and pain management  - Notify physician/advanced practitioner if interventions unsuccessful or patient reports new pain  Outcome: Progressing     Problem: SAFETY ADULT  Goal: Patient will remain free of falls  Description: INTERVENTIONS:  - Educate patient/family on patient safety including physical limitations  - Instruct patient to call for assistance with activity   - Consult OT/PT to assist with strengthening/mobility   - Keep Call bell within reach  - Keep bed low and locked with side rails adjusted as appropriate  - Keep care items and personal belongings within reach  - Initiate and maintain comfort rounds  - Make Fall Risk Sign visible to staff  - Offer Toileting every 2 Hours, in advance of need  - Initiate/Maintain bed alarm  - Apply yellow socks and  bracelet for high fall risk patients  - Consider moving patient to room near nurses station  Outcome: Progressing     Problem: DISCHARGE PLANNING  Goal: Discharge to home or other facility with appropriate resources  Description: INTERVENTIONS:  - Identify barriers to discharge w/patient and caregiver  - Arrange for needed discharge resources and transportation as appropriate  - Identify discharge learning needs (meds, wound care, etc.)  - Arrange for interpretive services to assist at discharge as needed  - Refer to Case Management Department for coordinating discharge planning if the patient needs post-hospital services based on physician/advanced practitioner order or complex needs related to functional status, cognitive ability, or social support system  Outcome: Progressing     Problem: Knowledge Deficit  Goal: Patient/family/caregiver demonstrates understanding of disease process, treatment plan, medications, and discharge instructions  Description: Complete learning assessment and assess knowledge base.  Interventions:  - Provide teaching at level of understanding  - Provide teaching via preferred learning methods  Outcome: Progressing     Problem: NEUROSENSORY - ADULT  Goal: Achieves stable or improved neurological status  Description: INTERVENTIONS  - Monitor and report changes in neurological status  - Monitor vital signs such as temperature, blood pressure, glucose, and any other labs ordered   - Initiate measures to prevent increased intracranial pressure  - Monitor for seizure activity and implement precautions if appropriate      Outcome: Progressing     Problem: METABOLIC, FLUID AND ELECTROLYTES - ADULT  Goal: Electrolytes maintained within normal limits  Description: INTERVENTIONS:  - Monitor labs and assess patient for signs and symptoms of electrolyte imbalances  - Administer electrolyte replacement as ordered  - Monitor response to electrolyte replacements, including repeat lab results as  appropriate  - Instruct patient on fluid and nutrition as appropriate  Outcome: Progressing
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
## 2024-01-20 NOTE — ASSESSMENT & PLAN NOTE
Patient was initially admitted to [place] detox on 1/4/24 and was treated with SEWS protocol with phenobarbital  She was transferred to [facility] ICU 1/6/24 for evaluation of GI bleed   She was started on serax but it was further discontinued 1/7 due to somnolence  S/p CIWA protocol  Pt received iv thiamine 500 mg IV q8h x 2 days, then 250 mg IV daily x 5 days  Now on oral thiamine and folic acid  Patient reported she wants to be done drinking.  Most of her drinking stems from anxiety. Started on Lexapro here for anxiety  Underwent evaluation HOST- agreeable to alcohol rehab however decided she wants to go home now and will go to outpt treatment. HOST will contact the patient tomorrow

## 2024-01-20 NOTE — ASSESSMENT & PLAN NOTE
Unwitnessed fall 1/19/24  Evaluated by on call provider shortly after  No neurodeficits and stat CT imaging performed  CT head with no evidence of acute abnormality   Neurochecks have remained unremarkable  Ambulating well and no further issues with falling

## 2024-01-20 NOTE — ASSESSMENT & PLAN NOTE
Pt presented to [facility] unit with acute alcoholic hepatitis  Initial  and T bili 4.7  Pt was tx with supportive measures:  discriminant function <32 and steroids not indicated  Discriminant function:  5--> steroids again not indicated  AST and bilirubin now decreased

## 2024-01-20 NOTE — ASSESSMENT & PLAN NOTE
Patient was transferred from [facility] detox unit to [facility] ICU on 1/5/24 for hematemesis  Pt was evaluated by GI team and initially placed on octreotide infusion, IV protonix   CT scan with evidence of varices and portal HTN  S/p Rocephin for SBP prophylaxis-completed 7 days  Had associated ABLA and hypotension and was transfused 1 unit PRBC on 1/8/24  Underwent EGD 1/8/2024-normal esophagus, no esophageal or gastric varices, 3 cm hiatal hernia, likely Bill's esophagus, mild portal hypertensive gastropathy and body of stomach, first part of duodenum and second part of duodenum appearing normal.  No old or fresh blood.   No further signs or evidence of bleeding  Hemoglobin stable between 8 and 9

## 2024-01-20 NOTE — ASSESSMENT & PLAN NOTE
Results from last 7 days   Lab Units 01/20/24  0507 01/19/24  0806 01/18/24  0514 01/16/24  0559   POTASSIUM mmol/L 3.7 3.0* 3.5 4.6   MAGNESIUM mg/dL 1.6* 1.6* 1.3* 1.5*   Treated for ongoing hypomagnesemia and hypokalemia  Suspect secondary to passing mixed/loose stool    Added probiotic and fiber supplement today to help bulk stools   Continue oral potassium supplementation BID  Continue with oral magnesium supplementation BID  Advised importance of repeat blood work in 1 week as well as PCP for further instruction on daily repletion

## 2024-01-22 NOTE — UTILIZATION REVIEW
NOTIFICATION OF ADMISSION DISCHARGE   This is a Notification of Discharge from [facility]. Please be advised that this patient has been discharge from our facility. Below you will find the admission and discharge date and time including the patient’s disposition.   UTILIZATION REVIEW CONTACT:  Calista Brambila MA  Utilization   Network Utilization Review Department  Phone: [phone] x carefully listen to the prompts. All voicemails are confidential.  Email: [email or web address]     ADMISSION INFORMATION  PRESENTATION DATE: 1/5/2024  7:36 PM  OBERVATION ADMISSION DATE:   INPATIENT ADMISSION DATE: 1/5/24  7:36 PM   DISCHARGE DATE: 1/20/2024  4:30 PM   DISPOSITION:Home/Self Care    Network Utilization Review Department  ATTENTION: Please call with any questions or concerns to [phone] and carefully listen to the prompts so that you are directed to the right person. All voicemails are confidential.   For Discharge needs, contact Care Management DC Support Team at [phone] opt. 2  Send all requests for admission clinical reviews, approved or denied determinations and any other requests to dedicated fax number below belonging to the campus where the patient is receiving treatment. List of dedicated fax numbers for the Facilities:  FACILITY NAME UR FAX NUMBER   ADMISSION DENIALS (Administrative/Medical Necessity) [phone]   DISCHARGE SUPPORT TEAM ([facility]) [phone]   PARENT CHILD HEALTH (Maternity/NICU/Pediatrics) [phone]   [facility] [phone]   [facility] [phone]   [facility] [phone]   [facility] [phone]   [facility] [phone]   [facility] [phone]   [facility] [phone]   [facility] [phone]    [facility] [phone]   [facility] [phone]   [facility] [phone]